# Patient Record
Sex: MALE | Race: WHITE | NOT HISPANIC OR LATINO | Employment: OTHER | URBAN - METROPOLITAN AREA
[De-identification: names, ages, dates, MRNs, and addresses within clinical notes are randomized per-mention and may not be internally consistent; named-entity substitution may affect disease eponyms.]

---

## 2017-01-04 ENCOUNTER — TELEPHONE (OUTPATIENT)
Dept: FAMILY MEDICINE | Facility: CLINIC | Age: 72
End: 2017-01-04

## 2017-01-04 NOTE — TELEPHONE ENCOUNTER
Pt is aware that his sleep study results are needed in order to receive his Cpap supplies. Sleep study order has been faxed to [facility] on 12/23/16.

## 2017-01-08 ENCOUNTER — PATIENT MESSAGE (OUTPATIENT)
Dept: FAMILY MEDICINE | Facility: CLINIC | Age: 72
End: 2017-01-08

## 2017-01-10 ENCOUNTER — OFFICE VISIT (OUTPATIENT)
Dept: NEUROLOGY | Facility: CLINIC | Age: 72
End: 2017-01-10
Payer: MEDICARE

## 2017-01-10 VITALS
WEIGHT: 246.69 LBS | HEART RATE: 76 BPM | SYSTOLIC BLOOD PRESSURE: 118 MMHG | HEIGHT: 73 IN | BODY MASS INDEX: 32.7 KG/M2 | DIASTOLIC BLOOD PRESSURE: 82 MMHG | RESPIRATION RATE: 17 BRPM

## 2017-01-10 DIAGNOSIS — G62.9 NEUROPATHY: Primary | ICD-10-CM

## 2017-01-10 DIAGNOSIS — E66.9 OBESITY (BMI 30.0-34.9): ICD-10-CM

## 2017-01-10 DIAGNOSIS — G56.03 BILATERAL CARPAL TUNNEL SYNDROME: ICD-10-CM

## 2017-01-10 PROCEDURE — 99213 OFFICE O/P EST LOW 20 MIN: CPT | Mod: PBBFAC | Performed by: PSYCHIATRY & NEUROLOGY

## 2017-01-10 PROCEDURE — 99214 OFFICE O/P EST MOD 30 MIN: CPT | Mod: S$PBB,,, | Performed by: PSYCHIATRY & NEUROLOGY

## 2017-01-10 PROCEDURE — 99999 PR PBB SHADOW E&M-EST. PATIENT-LVL III: CPT | Mod: PBBFAC,,, | Performed by: PSYCHIATRY & NEUROLOGY

## 2017-01-10 NOTE — MR AVS SNAPSHOT
[facility]  [address] [facility] [address]  Phone: [phone]  Fax: [phone]                  Alejo Melvin   1/10/2017 4:30 PM   Office Visit    Description:  Male : 1945   Provider:  Regis Hayes MD   Department:  [facility]           Reason for Visit     Neurologic Problem           Diagnoses this Visit        Comments    Neuropathy    -  Primary     Bilateral carpal tunnel syndrome         Obesity (BMI 30.0-34.9)                To Do List           Future Appointments        Provider Department Dept Phone    4/3/2017 8:30 AM Leroy Alston MD [facility] [phone]      Goals (5 Years of Data)     None      Follow-Up and Disposition     Return in about 2 years (around 1/10/2019).      [facility]     [facility] On [facility] Nurse Care Line -  Assistance  Registered nurses in the [facility] On Call Center provide clinical advisement, health education, appointment booking, and other advisory services.  Call for this free service at [phone].             Medications           Message regarding Medications     Verify the changes and/or additions to your medication regime listed below are the same as discussed with your clinician today.  If any of these changes or additions are incorrect, please notify your healthcare provider.        STOP taking these medications     Lactobacillus rhamnosus GG (CULTURELLE) 10 billion cell capsule Take 1 capsule by mouth once daily.           Verify that the below list of medications is an accurate representation of the medications you are currently taking.  If none reported, the list may be blank. If incorrect, please contact your healthcare provider. Carry this list with you in case of emergency.           Current Medications     amlodipine-benazepril 5-20 mg (LOTREL) 5-20 mg per capsule Take 1 capsule by mouth once daily.    ascorbic acid (VITAMIN C) 1000 MG tablet Take 1,000 mg  "by mouth once daily.      aspirin (ECOTRIN) 81 MG EC tablet Take 1 tablet (81 mg total) by mouth 2 (two) times daily.    atorvastatin (LIPITOR) 20 MG tablet Take 20 mg by mouth every evening.    cetirizine (ZYRTEC) 10 MG tablet TAKE 1 TABLET EVERY DAY    cholecalciferol, vitamin D3, (VITAMIN D3) 2,000 unit Cap Take 1 capsule by mouth once daily.    cyanocobalamin (VITAMIN B-12) 1000 MCG tablet Take 100 mcg by mouth once daily.    fish oil-omega-3 fatty acids 300-1,000 mg capsule Take 2 capsules (2 g total) by mouth once daily.    fluticasone (VERAMYST) 27.5 mcg/actuation nasal spray 2 sprays by Nasal route daily 2 hours after breakfast. 2 Spray, Suspension Nasal Every day.  prn allergies    furosemide (LASIX) 40 MG tablet Take 40 mg by mouth once daily.    GLUC STEPHENS/CHONDRO STEPHENS A/VIT C/MN (GLUCOSAMINE 1500 COMPLEX ORAL) Take 2 tablets by mouth once daily.      multivitamin capsule Take by mouth. Half Capsule Oral Twice a day     olopatadine (PATANASE) 0.6 % nasal spray 2 sprays by Each Nare route 2 (two) times daily. 2 Spray, Non-Aerosol Nasal Twice a day    VIAGRA 100 mg tablet TAKE 1 TABLET (100 MG TOTAL) BY MOUTH DAILY AS NEEDED FOR ERECTILE DYSFUNCTION.           Clinical Reference Information           Vital Signs - Last Recorded  Most recent update: 1/10/2017  5:15 PM by Veronique Loera LPN    BP Pulse Resp Ht Wt BMI    118/82 (BP Location: Right arm, Patient Position: Sitting, BP Method: Manual) 76 17 6' 1" (1.854 m) 111.9 kg (246 lb 11.1 oz) 32.55 kg/m2      Blood Pressure          Most Recent Value    BP  118/82      Allergies as of 1/10/2017     No Known Drug Allergies      Immunizations Administered on Date of Encounter - 1/10/2017     None      "
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
## 2017-02-22 ENCOUNTER — OFFICE VISIT (OUTPATIENT)
Dept: FAMILY MEDICINE | Facility: CLINIC | Age: 72
End: 2017-02-22
Payer: MEDICARE

## 2017-02-22 VITALS
BODY MASS INDEX: 32.62 KG/M2 | TEMPERATURE: 98 F | SYSTOLIC BLOOD PRESSURE: 128 MMHG | HEART RATE: 64 BPM | WEIGHT: 254.19 LBS | HEIGHT: 74 IN | RESPIRATION RATE: 18 BRPM | DIASTOLIC BLOOD PRESSURE: 80 MMHG

## 2017-02-22 DIAGNOSIS — M75.01 ADHESIVE CAPSULITIS OF RIGHT SHOULDER: ICD-10-CM

## 2017-02-22 DIAGNOSIS — J01.41 ACUTE RECURRENT PANSINUSITIS: ICD-10-CM

## 2017-02-22 DIAGNOSIS — G47.33 OSA ON CPAP: Primary | ICD-10-CM

## 2017-02-22 PROCEDURE — 96372 THER/PROPH/DIAG INJ SC/IM: CPT | Mod: PBBFAC

## 2017-02-22 PROCEDURE — 99214 OFFICE O/P EST MOD 30 MIN: CPT | Mod: S$PBB,,, | Performed by: FAMILY MEDICINE

## 2017-02-22 PROCEDURE — 99213 OFFICE O/P EST LOW 20 MIN: CPT | Mod: PBBFAC,25 | Performed by: FAMILY MEDICINE

## 2017-02-22 PROCEDURE — 99999 PR PBB SHADOW E&M-EST. PATIENT-LVL III: CPT | Mod: PBBFAC,,, | Performed by: FAMILY MEDICINE

## 2017-02-22 RX ORDER — FLUTICASONE PROPIONATE 50 MCG
1 SPRAY, SUSPENSION (ML) NASAL DAILY PRN
COMMUNITY
End: 2019-04-24 | Stop reason: SDUPTHER

## 2017-02-22 RX ORDER — FUROSEMIDE 40 MG/1
60 TABLET ORAL DAILY
COMMUNITY
End: 2019-04-24 | Stop reason: SDUPTHER

## 2017-02-22 RX ORDER — METHYLPREDNISOLONE ACETATE 40 MG/ML
60 INJECTION, SUSPENSION INTRA-ARTICULAR; INTRALESIONAL; INTRAMUSCULAR; SOFT TISSUE
Status: COMPLETED | OUTPATIENT
Start: 2017-02-22 | End: 2017-02-22

## 2017-02-22 RX ORDER — AZITHROMYCIN 250 MG/1
TABLET, FILM COATED ORAL
Qty: 6 TABLET | Refills: 0 | Status: SHIPPED | OUTPATIENT
Start: 2017-02-22 | End: 2017-04-07

## 2017-02-22 RX ADMIN — METHYLPREDNISOLONE ACETATE 60 MG: 40 INJECTION, SUSPENSION INTRA-ARTICULAR; INTRALESIONAL; INTRAMUSCULAR; SOFT TISSUE at 09:02

## 2017-02-22 NOTE — MR AVS SNAPSHOT
[address] [address]  Phone: [phone]  Fax: [phone]                  Alejo Melvin   2017 8:45 AM   Office Visit    Description:  Male : 1945   Provider:  Leroy Alston MD   Department:  [facility]           Reason for Visit     Cough     Nasal Congestion     Fever           Diagnoses this Visit        Comments    MARYLU on CPAP    -  Primary     Adhesive capsulitis of right shoulder         Acute recurrent pansinusitis                To Do List           Future Appointments        Provider Department Dept Phone    4/3/2017 8:30 AM Leroy Alston MD [facility] [phone]      Goals (5 Years of Data)     None      Follow-Up and Disposition     Return if symptoms worsen or fail to improve.       These Medications        Disp Refills Start End    azithromycin (Z-DESTINEY) 250 MG tablet 6 tablet 0 2017     2 po day 1, then 1 po q day    Pharmacy: [facility]/pharmacy #[id number] - [place] - [address] [facility] Ph #: [phone]         [facility] On Call     [facility] On Call Nurse Care Line - 24/7 Assistance  Registered nurses in the [facility] On Call Center provide clinical advisement, health education, appointment booking, and other advisory services.  Call for this free service at [phone].             Medications           Message regarding Medications     Verify the changes and/or additions to your medication regime listed below are the same as discussed with your clinician today.  If any of these changes or additions are incorrect, please notify your healthcare provider.        START taking these NEW medications        Refills    azithromycin (Z-DESTINEY) 250 MG tablet 0    Si po day 1, then 1 po q day    Class: Normal      These medications were administered today        Dose Freq    methylPREDNISolone acetate injection 60 mg 60 mg Clinic/HOD 1 time    Sig: Inject 1.5 mLs (60 mg total) into the  muscle one time.    Class: Normal    Route: Intramuscular           Verify that the below list of medications is an accurate representation of the medications you are currently taking.  If none reported, the list may be blank. If incorrect, please contact your healthcare provider. Carry this list with you in case of emergency.           Current Medications     amlodipine-benazepril 5-20 mg (LOTREL) 5-20 mg per capsule Take 1 capsule by mouth once daily.    ascorbic acid (VITAMIN C) 1000 MG tablet Take 1,000 mg by mouth once daily.      aspirin (ECOTRIN) 81 MG EC tablet Take 1 tablet (81 mg total) by mouth 2 (two) times daily.    atorvastatin (LIPITOR) 20 MG tablet Take 20 mg by mouth every evening.    cetirizine (ZYRTEC) 10 MG tablet TAKE 1 TABLET EVERY DAY    cholecalciferol, vitamin D3, (VITAMIN D3) 2,000 unit Cap Take 1 capsule by mouth once daily.    cyanocobalamin (VITAMIN B-12) 1000 MCG tablet Take 100 mcg by mouth once daily.    fish oil-omega-3 fatty acids 300-1,000 mg capsule Take 2 capsules (2 g total) by mouth once daily.    fluticasone (FLONASE) 50 mcg/actuation nasal spray 1 spray by Each Nare route daily as needed for Rhinitis.    fluticasone (VERAMYST) 27.5 mcg/actuation nasal spray 2 sprays by Nasal route daily 2 hours after breakfast. 2 Spray, Suspension Nasal Every day.  prn allergies    furosemide (LASIX) 40 MG tablet Take 1/2 tablet by mouth on Monday, Wednesday, and Friday. Take 1 tablet by mouth on Tuesday, Thursday, Saturday, and Sunday.    GLUC STEPHENS/CHONDRO STEPHENS A/VIT C/MN (GLUCOSAMINE 1500 COMPLEX ORAL) Take 2 tablets by mouth once daily.      multivitamin capsule Take 1 capsule by mouth once daily.     olopatadine (PATANASE) 0.6 % nasal spray 2 sprays by Each Nare route 2 (two) times daily. 2 Spray, Non-Aerosol Nasal Twice a day    VIAGRA 100 mg tablet TAKE 1 TABLET (100 MG TOTAL) BY MOUTH DAILY AS NEEDED FOR ERECTILE DYSFUNCTION.    azithromycin (Z-DESTINEY) 250 MG tablet 2 po day 1, then 1 po q  "day           Clinical Reference Information           Your Vitals Were     BP Pulse Temp Resp    128/80 (BP Location: Right arm, Patient Position: Sitting, BP Method: Manual) 64 98.2 °F (36.8 °C) (Tympanic) 18    Height Weight BMI    6' 2" (1.88 m) 115.3 kg (254 lb 3.1 oz) 32.64 kg/m2      Blood Pressure          Most Recent Value    BP  128/80      Allergies as of 2/22/2017     No Known Drug Allergies      Immunizations Administered on Date of Encounter - 2/22/2017     None      Orders Placed During Today's Visit      Normal Orders This Visit    Ambulatory Referral to Physical/Occupational Therapy     Future Labs/Procedures Expected by Expires    CPAP Titration (Must have dx of MARYLU from previous sleep study.)  As directed 2/22/2018      Language Assistance Services     ATTENTION: Language assistance services are available, free of charge. Please call [phone].      ATENCIÓN: Si mere banegas, tiene a vincent disposición servicios gratuitos de asistencia lingüística. Llame al [phone].     TOÑO Ý: N?u b?n nói Ti?ng Vi?t, có các d?ch v? h? tr? ngôn ng? mi?n phí dành cho b?n. G?i s? [phone].         [facility] complies with applicable Federal civil rights laws and does not discriminate on the basis of race, color, national origin, age, disability, or sex.        "
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
## 2017-03-14 ENCOUNTER — HOSPITAL ENCOUNTER (OUTPATIENT)
Dept: SLEEP MEDICINE | Facility: HOSPITAL | Age: 72
Discharge: HOME | End: 2017-03-14
Attending: FAMILY MEDICINE
Payer: MEDICARE

## 2017-03-14 PROCEDURE — 95811 POLYSOM 6/>YRS CPAP 4/> PARM: CPT

## 2017-04-03 ENCOUNTER — PATIENT MESSAGE (OUTPATIENT)
Dept: FAMILY MEDICINE | Facility: CLINIC | Age: 72
End: 2017-04-03

## 2017-04-07 ENCOUNTER — OFFICE VISIT (OUTPATIENT)
Dept: FAMILY MEDICINE | Facility: CLINIC | Age: 72
End: 2017-04-07
Payer: MEDICARE

## 2017-04-07 VITALS
BODY MASS INDEX: 32.81 KG/M2 | HEART RATE: 72 BPM | HEIGHT: 73 IN | WEIGHT: 247.56 LBS | SYSTOLIC BLOOD PRESSURE: 116 MMHG | DIASTOLIC BLOOD PRESSURE: 86 MMHG | RESPIRATION RATE: 14 BRPM

## 2017-04-07 DIAGNOSIS — G47.33 OSA ON CPAP: ICD-10-CM

## 2017-04-07 DIAGNOSIS — E78.2 MIXED HYPERLIPIDEMIA: Primary | ICD-10-CM

## 2017-04-07 DIAGNOSIS — J33.9 NASAL POLYP: ICD-10-CM

## 2017-04-07 DIAGNOSIS — I10 ESSENTIAL HYPERTENSION: ICD-10-CM

## 2017-04-07 LAB
ALT SERPL W/O P-5'-P-CCNC: 20 U/L
ANION GAP SERPL CALC-SCNC: 6 MMOL/L
BUN SERPL-MCNC: 22 MG/DL
CALCIUM SERPL-MCNC: 9.5 MG/DL
CHLORIDE SERPL-SCNC: 105 MMOL/L
CHOLEST/HDLC SERPL: 4.8 {RATIO}
CO2 SERPL-SCNC: 29 MMOL/L
CREAT SERPL-MCNC: 1 MG/DL
EST. GFR  (AFRICAN AMERICAN): >60 ML/MIN/1.73 M^2
EST. GFR  (NON AFRICAN AMERICAN): >60 ML/MIN/1.73 M^2
GLUCOSE SERPL-MCNC: 89 MG/DL
HDL/CHOLESTEROL RATIO: 20.9 %
HDLC SERPL-MCNC: 158 MG/DL
HDLC SERPL-MCNC: 33 MG/DL
LDLC SERPL CALC-MCNC: 99 MG/DL
NONHDLC SERPL-MCNC: 125 MG/DL
POTASSIUM SERPL-SCNC: 4.1 MMOL/L
SODIUM SERPL-SCNC: 140 MMOL/L
TRIGL SERPL-MCNC: 130 MG/DL

## 2017-04-07 PROCEDURE — 84460 ALANINE AMINO (ALT) (SGPT): CPT

## 2017-04-07 PROCEDURE — 99999 PR PBB SHADOW E&M-EST. PATIENT-LVL III: CPT | Mod: PBBFAC,,, | Performed by: FAMILY MEDICINE

## 2017-04-07 PROCEDURE — 99214 OFFICE O/P EST MOD 30 MIN: CPT | Mod: S$PBB | Performed by: FAMILY MEDICINE

## 2017-04-07 PROCEDURE — 80048 BASIC METABOLIC PNL TOTAL CA: CPT

## 2017-04-07 PROCEDURE — 80061 LIPID PANEL: CPT

## 2017-04-07 PROCEDURE — 3079F DIAST BP 80-89 MM HG: CPT | Performed by: FAMILY MEDICINE

## 2017-04-07 PROCEDURE — 99213 OFFICE O/P EST LOW 20 MIN: CPT | Mod: PBBFAC | Performed by: FAMILY MEDICINE

## 2017-04-07 PROCEDURE — 1157F ADVNC CARE PLAN IN RCRD: CPT | Performed by: FAMILY MEDICINE

## 2017-04-07 PROCEDURE — 1160F RVW MEDS BY RX/DR IN RCRD: CPT | Performed by: FAMILY MEDICINE

## 2017-04-07 PROCEDURE — 36415 COLL VENOUS BLD VENIPUNCTURE: CPT | Mod: PBBFAC

## 2017-04-07 PROCEDURE — 3074F SYST BP LT 130 MM HG: CPT | Performed by: FAMILY MEDICINE

## 2017-04-07 PROCEDURE — 1159F MED LIST DOCD IN RCRD: CPT | Performed by: FAMILY MEDICINE

## 2017-04-07 RX ORDER — AMLODIPINE AND BENAZEPRIL HYDROCHLORIDE 5; 20 MG/1; MG/1
1 CAPSULE ORAL DAILY
Qty: 30 CAPSULE | Refills: 6 | Status: SHIPPED | OUTPATIENT
Start: 2017-04-07 | End: 2018-02-10 | Stop reason: SDUPTHER

## 2017-04-07 NOTE — MR AVS SNAPSHOT
[address] [address]  Phone: [phone]  Fax: [phone]                  Alejo Melvin   2017 9:15 AM   Office Visit    Description:  Male : 1945   Provider:  Leroy Alston MD   Department:  [facility]           Reason for Visit     Follow-up           Diagnoses this Visit        Comments    Mixed hyperlipidemia    -  Primary     Essential hypertension         MARYLU on CPAP         Nasal polyp                To Do List           Future Appointments        Provider Department Dept Phone    10/4/2017 9:15 AM Leroy Alston MD [facility] [phone]      Goals (5 Years of Data)     None      Follow-Up and Disposition     Return in about 6 months (around 10/7/2017).       These Medications        Disp Refills Start End    amlodipine-benazepril 5-20 mg (LOTREL) 5-20 mg per capsule 30 capsule 6 2017     Take 1 capsule by mouth once daily. - Oral    Pharmacy: [facility]/pharmacy #[id number] - [place] - [address] [facility] Ph #: [phone]         [facility] On Call     [facility] On Call Nurse Care Line -  Assistance  Unless otherwise directed by your provider, please contact [facility], our nurse care line that is available for  assistance.     Registered nurses in the [facility] On Call Center provide: appointment scheduling, clinical advisement, health education, and other advisory services.  Call: [phone] (toll free)               Medications           Message regarding Medications     Verify the changes and/or additions to your medication regime listed below are the same as discussed with your clinician today.  If any of these changes or additions are incorrect, please notify your healthcare provider.        STOP taking these medications     azithromycin (Z-DESTINEY) 250 MG tablet 2 po day 1, then 1 po q day           Verify that the below list of medications is an accurate representation of  "the medications you are currently taking.  If none reported, the list may be blank. If incorrect, please contact your healthcare provider. Carry this list with you in case of emergency.           Current Medications     amlodipine-benazepril 5-20 mg (LOTREL) 5-20 mg per capsule Take 1 capsule by mouth once daily.    ascorbic acid (VITAMIN C) 1000 MG tablet Take 1,000 mg by mouth once daily.      aspirin (ECOTRIN) 81 MG EC tablet Take 1 tablet (81 mg total) by mouth 2 (two) times daily.    atorvastatin (LIPITOR) 20 MG tablet Take 20 mg by mouth every evening.    cetirizine (ZYRTEC) 10 MG tablet TAKE 1 TABLET EVERY DAY    cholecalciferol, vitamin D3, (VITAMIN D3) 2,000 unit Cap Take 1 capsule by mouth once daily.    cyanocobalamin (VITAMIN B-12) 1000 MCG tablet Take 100 mcg by mouth once daily.    fish oil-omega-3 fatty acids 300-1,000 mg capsule Take 2 capsules (2 g total) by mouth once daily.    fluticasone (FLONASE) 50 mcg/actuation nasal spray 1 spray by Each Nare route daily as needed for Rhinitis.    fluticasone (VERAMYST) 27.5 mcg/actuation nasal spray 2 sprays by Nasal route daily 2 hours after breakfast. 2 Spray, Suspension Nasal Every day.  prn allergies    furosemide (LASIX) 40 MG tablet Take 1/2 tablet by mouth on Monday, Wednesday, and Friday. Take 1 tablet by mouth on Tuesday, Thursday, Saturday, and Sunday.    GLUC STEPHENS/CHONDRO STEPHENS A/VIT C/MN (GLUCOSAMINE 1500 COMPLEX ORAL) Take 2 tablets by mouth once daily.      multivitamin capsule Take 1 capsule by mouth once daily.     olopatadine (PATANASE) 0.6 % nasal spray 2 sprays by Each Nare route 2 (two) times daily. 2 Spray, Non-Aerosol Nasal Twice a day    VIAGRA 100 mg tablet TAKE 1 TABLET (100 MG TOTAL) BY MOUTH DAILY AS NEEDED FOR ERECTILE DYSFUNCTION.           Clinical Reference Information           Your Vitals Were     BP Pulse Resp Height Weight BMI    116/86 (BP Location: Left arm, Patient Position: Sitting, BP Method: Manual) 72 14 6' 1" (1.854 m) " 112.3 kg (247 lb 9.2 oz) 32.66 kg/m2      Blood Pressure          Most Recent Value    BP  116/86      Allergies as of 4/7/2017     No Known Drug Allergies      Immunizations Administered on Date of Encounter - 4/7/2017     None      Orders Placed During Today's Visit      Normal Orders This Visit    CPAP/BIPAP SUPPLIES     Future Labs/Procedures Expected by Expires    ALT (SGPT)  4/7/2017 4/7/2018    Basic metabolic panel  4/7/2017 4/7/2018    Lipid panel  4/7/2017 4/7/2018      Language Assistance Services     ATTENTION: Language assistance services are available, free of charge. Please call [phone].      ATENCIÓN: Si habla español, tiene a vincent disposición servicios gratuitos de asistencia lingüística. Llame al [phone].     CHÚ Ý: N?u b?n nói Ti?ng Vi?t, có các d?ch v? h? tr? ngôn ng? mi?n phí dành cho b?n. G?i s? [phone].         [facility] complies with applicable Federal civil rights laws and does not discriminate on the basis of race, color, national origin, age, disability, or sex.

## 2017-04-07 NOTE — PROGRESS NOTES
Pt is a 71 y.o. male who presents for check up for   Encounter Diagnoses   Name Primary?    Mixed hyperlipidemia Yes    Essential hypertension     MARYLU on CPAP     Nasal polyp    . Doing well on current meds. Denies any side effects. Prevention is not up to date.    HPI: Patient here for a checkup.  Patient is here 1 week early because he's suffering with rather severe sinus symptoms.  He has a history of nasal polyps.  This seems be getting worse.    He also complains of left knee pain.    Patient has obstructive sleep apnea.  He tolerates his CPAP well.  He was diagnosed with Mobitz type II heart block.  He has a pace maker.  Doing well.  His blood pressure is well controlled with Lotrel.  He does not have side effects such as swelling or chronic dry cough.  He checks his blood pressure at home it is usually better than here.  .  His allergy symptoms are controlled when he is using his fluticasone and patanase.  He saw ENT and they removed polyps which helped a little.    His cholesterol is under good control.  He takes Lipitor 20 mg daily.  He is not having side effects such as flushing or myalgias.  He will stop the niacin.  As arthritis is better since using glucosamine/chondroitin sulfate    ROS:   Gen.: No fever, chills, weight loss, weight gain  HEENT: No headaches, sore throat, change in vision  CV: No chest pain  RESP: No shortness of breath, wheezing, cough  GI: No change in bowel habits, no diarrhea, no abdominal pain, no hematochezia  : No dysuria, frequency  MSK: No muscle pain, joint pain, back pain  NEURO: No numbness, weakness  ENDO: No polyuria, polydipsia, heat or cold intolerance    PMH, PSH, ALLERGIES, SH, FH reviewed  Medications reviewed nurses notes  Last colonoscopy 2/25/14    PHYSICAL EXAM;   Vitals:    04/07/17 0914   BP: 116/86   Pulse: 72   Resp: 14     APPEARANCE: Well nourished, well developed, in no acute distress.   HEENT:  griselda nasal congestion, Clear rhinorrhea, pale boggy  nasal mucosa.  Nasal polyps seen.    CHEST: Lungs clear to auscultation.  CARDIOVASCULAR: Normal S1, S2. No rubs, murmurs or gallops.  Irregular rhythm consistent with Mobitz type II heart block  ABDOMEN: Bowel sounds normal. Not distended. Soft. No tenderness or masses.  : normal prostate. done on 10/15.  MUSCULOSKELETAL: Both knees with crepitus bilaterally.  No effusions.  No edema.  Tenderness over the right posterior thoracic area.  Small hematoma over the right hip pointer.  Heart mild bruising.  Back: Moderate tenderness and spasm the lumbar region, negative straight leg raise, DTRs are 2+ and equal in knees and ankles.  Strength in the legs is 5+ equally.  Straight leg raise did cause lumbar pain and mild pain into the leg.  Skin exam reveals a small old ruptured sebaceous cyst.  3 skin tags.  2 or 3 compound nevi    Lab Results   Component Value Date    LDLCALC 99.0 10/06/2016     BMP  Lab Results   Component Value Date     10/06/2016    K 4.1 10/06/2016     10/06/2016    CO2 25 10/06/2016    BUN 21 10/06/2016    CREATININE 0.9 10/06/2016    CALCIUM 9.6 10/06/2016    ANIONGAP 9 10/06/2016    ESTGFRAFRICA >60 10/06/2016    EGFRNONAA >60 10/06/2016     ASSESSMENT/PLAN:    Hypertension  Two gram sodium diet.    Weight loss discussed.    Try to walk 2 miles per day.    Patient now realizes he must take his medication every day   Current medications will be:  - amlodipine-benazepril 5-20 mg (LOTREL) 5-20 mg per capsule; Take 1 capsule by mouth once daily.  - aspirin (ECOTRIN) 81 MG EC tablet; Take 1 tablet (81 mg total) by mouth 2 (two) times daily.    Hyperlipidemia  Low fat diet.  Avoid sweets.  A 10 pound weight loss by the next visit as a  good goal.  Increase consumption of fruits and vegetables, fish and chicken.  Use medications below:  - fish oil-omega-3 fatty acids 300-1,000 mg capsule; Take 2 capsules (2 g total) by mouth once daily.  - Lipitor 20 mg by mouth daily    Obstructive sleep  apnea (adult) (pediatric)  Continue using CPAP.    Ar (allergic rhinitis)  - fluticasone (VERAMYST) 27.5 mcg/actuation nasal spray; 2 sprays by Nasal route daily 2 hours after breakfast. 2 Spray, Suspension Nasal Every day.  prn allergies  - olopatadine (PATANASE) 0.6 % Spry; 2 sprays by Nasal route 2 (two) times daily. 2 Spray, Non-Aerosol Nasal Twice a day    Osteoarthritis  - naproxen sodium (ALEVE) 220 mg Cap; Take 220 mg by mouth 3 (three) times daily as needed. 1 Capsule Oral Twice a day  If knee pain persists, return to clinic for a joint injection.    Other Orders  - GLUC STEPHENS/CHONDRO STEPHENS A/VIT C/MN (GLUCOSAMINE 1500 COMPLEX ORAL); Take 2 tablets by mouth once daily.    - HYALURONATE SODIUM (HYALURONIC ACID, SODIUM, ORAL); Take 4 capsules by mouth once daily.      Alejo was seen today for follow-up.    Diagnoses and all orders for this visit:    Mixed hyperlipidemia  -     ALT (SGPT); Future  -     Basic metabolic panel; Future  -     Lipid panel; Future    Essential hypertension  -     amlodipine-benazepril 5-20 mg (LOTREL) 5-20 mg per capsule; Take 1 capsule by mouth once daily.    MARYLU on CPAP  -     CPAP/BIPAP SUPPLIES    Nasal polyp    refer to Dr. Lovell-ENT  Next appointment will be in 6 months.

## 2017-04-22 RX ORDER — SILDENAFIL CITRATE 100 MG/1
TABLET, FILM COATED ORAL
Qty: 6 TABLET | Refills: 3 | Status: SHIPPED | OUTPATIENT
Start: 2017-04-22 | End: 2019-04-24

## 2017-07-27 DIAGNOSIS — J30.9 ALLERGIC RHINITIS: ICD-10-CM

## 2017-07-28 RX ORDER — CETIRIZINE HYDROCHLORIDE 10 MG/1
TABLET ORAL
Qty: 30 TABLET | Refills: 5 | Status: SHIPPED | OUTPATIENT
Start: 2017-07-28 | End: 2018-03-19 | Stop reason: SDUPTHER

## 2017-08-22 DIAGNOSIS — Z12.11 COLON CANCER SCREENING: ICD-10-CM

## 2017-09-20 ENCOUNTER — PATIENT OUTREACH (OUTPATIENT)
Dept: ADMINISTRATIVE | Facility: HOSPITAL | Age: 72
End: 2017-09-20

## 2017-10-04 ENCOUNTER — OFFICE VISIT (OUTPATIENT)
Dept: FAMILY MEDICINE | Facility: CLINIC | Age: 72
End: 2017-10-04
Payer: MEDICARE

## 2017-10-04 VITALS
DIASTOLIC BLOOD PRESSURE: 82 MMHG | WEIGHT: 257.38 LBS | SYSTOLIC BLOOD PRESSURE: 148 MMHG | RESPIRATION RATE: 18 BRPM | BODY MASS INDEX: 34.11 KG/M2 | HEART RATE: 68 BPM | HEIGHT: 73 IN

## 2017-10-04 DIAGNOSIS — I10 ESSENTIAL HYPERTENSION: ICD-10-CM

## 2017-10-04 DIAGNOSIS — G47.33 OSA ON CPAP: ICD-10-CM

## 2017-10-04 DIAGNOSIS — G47.33 OBSTRUCTIVE SLEEP APNEA: ICD-10-CM

## 2017-10-04 DIAGNOSIS — E78.2 MIXED HYPERLIPIDEMIA: Primary | ICD-10-CM

## 2017-10-04 PROCEDURE — 99213 OFFICE O/P EST LOW 20 MIN: CPT | Mod: PBBFAC | Performed by: FAMILY MEDICINE

## 2017-10-04 PROCEDURE — 99999 PR PBB SHADOW E&M-EST. PATIENT-LVL III: CPT | Mod: PBBFAC,,, | Performed by: FAMILY MEDICINE

## 2017-10-04 PROCEDURE — 99999 PR STA SHADOW: CPT | Mod: PBBFAC,,, | Performed by: FAMILY MEDICINE

## 2017-10-04 PROCEDURE — 99214 OFFICE O/P EST MOD 30 MIN: CPT | Mod: S$PBB | Performed by: FAMILY MEDICINE

## 2017-10-04 RX ORDER — ATORVASTATIN CALCIUM 40 MG/1
40 TABLET, FILM COATED ORAL NIGHTLY
Refills: 11 | COMMUNITY
Start: 2017-09-05 | End: 2019-04-24 | Stop reason: SDUPTHER

## 2017-10-04 NOTE — PROGRESS NOTES
Pt is a 72 y.o. male who presents for check up for   Encounter Diagnoses   Name Primary?    Mixed hyperlipidemia Yes    Essential hypertension     MARYLU on CPAP     Obstructive sleep apnea    . Doing well on current meds. Denies any side effects. Prevention is not up to date.    HPI: Patient here for a checkup.  Patient is here 1 week early because he's suffering with rather severe sinus symptoms.  He has a history of nasal polyps.  This seems be getting worse.    He also complains of left knee pain.    Patient has obstructive sleep apnea.  He tolerates his CPAP well.  He was diagnosed with Mobitz type II heart block.  He has a pace maker.  Doing well.  His blood pressure is well controlled with Lotrel.  He does not have side effects such as swelling or chronic dry cough.  He checks his blood pressure at home it is usually better than here.  Sees cardiology at this time.  His allergy symptoms are controlled when he is using his fluticasone and patanase.  He saw ENT and they removed polyps which helped a little.    His cholesterol is under good control.  He takes Lipitor 20 mg daily.  He is not having side effects such as flushing or myalgias.  He will stop the niacin.  Has arthritis is better since using glucosamine/chondroitin sulfate    ROS:   Gen.: No fever, chills, weight loss, weight gain  HEENT: No headaches, sore throat, change in vision  CV: No chest pain  RESP: No shortness of breath, wheezing, cough  GI: No change in bowel habits, no diarrhea, no abdominal pain, no hematochezia  : No dysuria, frequency  MSK: No muscle pain, joint pain, back pain  NEURO: No numbness, weakness  ENDO: No polyuria, polydipsia, heat or cold intolerance    PMH, PSH, ALLERGIES, SH, FH reviewed  Medications reviewed nurses notes  Last colonoscopy 2/25/14    PHYSICAL EXAM;   Vitals:    10/04/17 0851   BP: (!) 148/82   Pulse: 68   Resp: 18     APPEARANCE: Well nourished, well developed, in no acute distress.   HEENT:  griselda nasal  congestion, Clear rhinorrhea, pale boggy nasal mucosa.  Nasal polyps seen.    CHEST: Lungs clear to auscultation.  CARDIOVASCULAR: Normal S1, S2. No rubs, murmurs or gallops.  Irregular rhythm consistent with Mobitz type II heart block  ABDOMEN: Bowel sounds normal. Not distended. Soft. No tenderness or masses.  : normal prostate. done on 10/17.  MUSCULOSKELETAL: Both knees with crepitus bilaterally.  No effusions.  No edema.  Tenderness over the right posterior thoracic area.  Small hematoma over the right hip pointer.  Heart mild bruising.  Back: Moderate tenderness and spasm the lumbar region, negative straight leg raise, DTRs are 2+ and equal in knees and ankles.  Strength in the legs is 5+ equally.  Straight leg raise did cause lumbar pain and mild pain into the leg.  Skin exam reveals a small old ruptured sebaceous cyst.  3 skin tags.  2 or 3 compound nevi    Lab Results   Component Value Date    LDLCALC 99.0 04/07/2017     BMP  Lab Results   Component Value Date     04/07/2017    K 4.1 04/07/2017     04/07/2017    CO2 29 04/07/2017    BUN 22 04/07/2017    CREATININE 1.0 04/07/2017    CALCIUM 9.5 04/07/2017    ANIONGAP 6 (L) 04/07/2017    ESTGFRAFRICA >60 04/07/2017    EGFRNONAA >60 04/07/2017     ASSESSMENT/PLAN:    Hypertension  Two gram sodium diet.    Weight loss discussed.    Try to walk 2 miles per day.    Patient now realizes he must take his medication every day   Current medications will be:  - amlodipine-benazepril 5-20 mg (LOTREL) 5-20 mg per capsule; Take 1 capsule by mouth once daily.  - aspirin (ECOTRIN) 81 MG EC tablet; Take 1 tablet (81 mg total) by mouth 2 (two) times daily.    Hyperlipidemia  Low fat diet.  Avoid sweets.  A 10 pound weight loss by the next visit as a  good goal.  Increase consumption of fruits and vegetables, fish and chicken.  Use medications below:  - fish oil-omega-3 fatty acids 300-1,000 mg capsule; Take 2 capsules (2 g total) by mouth once daily.  - Lipitor  20 mg by mouth daily    Obstructive sleep apnea (adult) (pediatric)  Continue using CPAP.    Ar (allergic rhinitis)  - fluticasone (VERAMYST) 27.5 mcg/actuation nasal spray; 2 sprays by Nasal route daily 2 hours after breakfast. 2 Spray, Suspension Nasal Every day.  prn allergies  - olopatadine (PATANASE) 0.6 % Spry; 2 sprays by Nasal route 2 (two) times daily. 2 Spray, Non-Aerosol Nasal Twice a day    Osteoarthritis  - naproxen sodium (ALEVE) 220 mg Cap; Take 220 mg by mouth 3 (three) times daily as needed. 1 Capsule Oral Twice a day  If knee pain persists, return to clinic for a joint injection.    Other Orders  - GLUC STEPHENS/CHONDRO STEPHENS A/VIT C/MN (GLUCOSAMINE 1500 COMPLEX ORAL); Take 2 tablets by mouth once daily.    - HYALURONATE SODIUM (HYALURONIC ACID, SODIUM, ORAL); Take 4 capsules by mouth once daily.      Alejo was seen today for follow-up.    Diagnoses and all orders for this visit:    Mixed hyperlipidemia    Essential hypertension    MARYLU on CPAP    Obstructive sleep apnea      Keep appt with Dr. Lovell-ENT    Next appointment will be in 6 months.

## 2018-01-04 ENCOUNTER — PATIENT MESSAGE (OUTPATIENT)
Dept: FAMILY MEDICINE | Facility: CLINIC | Age: 73
End: 2018-01-04

## 2018-01-05 RX ORDER — SILDENAFIL CITRATE 20 MG/1
TABLET ORAL
Qty: 30 TABLET | Refills: 5 | Status: SHIPPED | OUTPATIENT
Start: 2018-01-05 | End: 2019-03-27 | Stop reason: SDUPTHER

## 2018-01-20 ENCOUNTER — PATIENT MESSAGE (OUTPATIENT)
Dept: FAMILY MEDICINE | Facility: CLINIC | Age: 73
End: 2018-01-20

## 2018-02-10 DIAGNOSIS — I10 ESSENTIAL HYPERTENSION: ICD-10-CM

## 2018-02-11 RX ORDER — AMLODIPINE AND BENAZEPRIL HYDROCHLORIDE 5; 20 MG/1; MG/1
CAPSULE ORAL
Qty: 30 CAPSULE | Refills: 6 | Status: SHIPPED | OUTPATIENT
Start: 2018-02-11 | End: 2018-10-12 | Stop reason: SDUPTHER

## 2018-03-19 DIAGNOSIS — J30.9 ALLERGIC RHINITIS: ICD-10-CM

## 2018-03-19 RX ORDER — CETIRIZINE HYDROCHLORIDE 10 MG/1
TABLET, FILM COATED ORAL
Qty: 30 TABLET | Refills: 5 | Status: SHIPPED | OUTPATIENT
Start: 2018-03-19 | End: 2018-10-12 | Stop reason: SDUPTHER

## 2018-04-09 ENCOUNTER — OFFICE VISIT (OUTPATIENT)
Dept: FAMILY MEDICINE | Facility: CLINIC | Age: 73
End: 2018-04-09
Payer: MEDICARE

## 2018-04-09 VITALS
SYSTOLIC BLOOD PRESSURE: 112 MMHG | BODY MASS INDEX: 34.11 KG/M2 | DIASTOLIC BLOOD PRESSURE: 70 MMHG | WEIGHT: 257.38 LBS | HEART RATE: 76 BPM | HEIGHT: 73 IN | RESPIRATION RATE: 18 BRPM

## 2018-04-09 DIAGNOSIS — M17.12 PRIMARY OSTEOARTHRITIS OF LEFT KNEE: ICD-10-CM

## 2018-04-09 DIAGNOSIS — J30.89 CHRONIC NONSEASONAL ALLERGIC RHINITIS DUE TO POLLEN: ICD-10-CM

## 2018-04-09 DIAGNOSIS — L02.419 CELLULITIS AND ABSCESS OF LEG: Primary | ICD-10-CM

## 2018-04-09 DIAGNOSIS — G47.33 OSA ON CPAP: ICD-10-CM

## 2018-04-09 DIAGNOSIS — L03.119 CELLULITIS AND ABSCESS OF LEG: Primary | ICD-10-CM

## 2018-04-09 PROCEDURE — 99214 OFFICE O/P EST MOD 30 MIN: CPT | Mod: S$PBB | Performed by: FAMILY MEDICINE

## 2018-04-09 PROCEDURE — 99213 OFFICE O/P EST LOW 20 MIN: CPT | Mod: PBBFAC | Performed by: FAMILY MEDICINE

## 2018-04-09 PROCEDURE — 99999 PR PBB SHADOW E&M-EST. PATIENT-LVL III: CPT | Mod: PBBFAC,,, | Performed by: FAMILY MEDICINE

## 2018-04-09 PROCEDURE — 99999 PR STA SHADOW: CPT | Mod: PBBFAC,,, | Performed by: FAMILY MEDICINE

## 2018-04-09 RX ORDER — MUPIROCIN 20 MG/G
OINTMENT TOPICAL 2 TIMES DAILY
Qty: 22 G | Refills: 3 | Status: SHIPPED | OUTPATIENT
Start: 2018-04-09 | End: 2019-05-01

## 2018-04-09 RX ORDER — CEFADROXIL 500 MG/1
500 CAPSULE ORAL EVERY 12 HOURS
Qty: 14 CAPSULE | Refills: 0 | Status: SHIPPED | OUTPATIENT
Start: 2018-04-09 | End: 2018-04-19
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
## 2018-04-20 ENCOUNTER — TELEPHONE (OUTPATIENT)
Dept: FAMILY MEDICINE | Facility: CLINIC | Age: 73
End: 2018-04-20

## 2018-04-21 ENCOUNTER — OFFICE VISIT (OUTPATIENT)
Dept: FAMILY MEDICINE | Facility: CLINIC | Age: 73
End: 2018-04-21
Payer: MEDICARE

## 2018-04-21 VITALS
RESPIRATION RATE: 20 BRPM | TEMPERATURE: 98 F | SYSTOLIC BLOOD PRESSURE: 130 MMHG | WEIGHT: 263.19 LBS | DIASTOLIC BLOOD PRESSURE: 80 MMHG | HEIGHT: 73 IN | HEART RATE: 86 BPM | BODY MASS INDEX: 34.88 KG/M2

## 2018-04-21 DIAGNOSIS — R23.4 ESCHAR OF LOWER LEG: Primary | ICD-10-CM

## 2018-04-21 PROCEDURE — 99999 PR STA SHADOW: CPT | Mod: PBBFAC,,, | Performed by: NURSE PRACTITIONER

## 2018-04-21 PROCEDURE — 99999 PR PBB SHADOW E&M-EST. PATIENT-LVL IV: CPT | Mod: PBBFAC,,, | Performed by: NURSE PRACTITIONER

## 2018-04-21 PROCEDURE — 99214 OFFICE O/P EST MOD 30 MIN: CPT | Mod: PBBFAC | Performed by: NURSE PRACTITIONER

## 2018-04-21 PROCEDURE — 99213 OFFICE O/P EST LOW 20 MIN: CPT | Mod: S$PBB | Performed by: NURSE PRACTITIONER
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
## 2018-04-24 ENCOUNTER — OFFICE VISIT (OUTPATIENT)
Dept: FAMILY MEDICINE | Facility: CLINIC | Age: 73
End: 2018-04-24
Payer: MEDICARE

## 2018-04-24 VITALS
HEIGHT: 73 IN | SYSTOLIC BLOOD PRESSURE: 120 MMHG | HEART RATE: 76 BPM | BODY MASS INDEX: 34.62 KG/M2 | RESPIRATION RATE: 18 BRPM | DIASTOLIC BLOOD PRESSURE: 72 MMHG | WEIGHT: 261.19 LBS

## 2018-04-24 DIAGNOSIS — T25.212D: ICD-10-CM

## 2018-04-24 DIAGNOSIS — R23.4 ESCHAR OF LOWER LEG: Primary | ICD-10-CM

## 2018-04-24 PROCEDURE — 99999 PR PBB SHADOW E&M-EST. PATIENT-LVL III: CPT | Mod: PBBFAC,,, | Performed by: FAMILY MEDICINE

## 2018-04-24 PROCEDURE — 99213 OFFICE O/P EST LOW 20 MIN: CPT | Mod: PBBFAC | Performed by: FAMILY MEDICINE

## 2018-04-24 PROCEDURE — 99999 PR STA SHADOW: CPT | Mod: PBBFAC,,, | Performed by: FAMILY MEDICINE

## 2018-04-24 PROCEDURE — 99213 OFFICE O/P EST LOW 20 MIN: CPT | Mod: S$PBB | Performed by: FAMILY MEDICINE
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
## 2018-05-11 ENCOUNTER — PATIENT MESSAGE (OUTPATIENT)
Dept: FAMILY MEDICINE | Facility: CLINIC | Age: 73
End: 2018-05-11

## 2018-07-25 ENCOUNTER — TELEPHONE (OUTPATIENT)
Dept: FAMILY MEDICINE | Facility: CLINIC | Age: 73
End: 2018-07-25

## 2018-07-25 ENCOUNTER — PATIENT MESSAGE (OUTPATIENT)
Dept: FAMILY MEDICINE | Facility: CLINIC | Age: 73
End: 2018-07-25

## 2018-07-25 DIAGNOSIS — G47.33 OSA ON CPAP: Primary | ICD-10-CM

## 2018-07-25 NOTE — TELEPHONE ENCOUNTER
----- Message from Giuliano Torre sent at 2018  9:40 AM CDT -----  Contact: Patient  Alejo Melvin  MRN: [id number]  : 1945  PCP: Leroy Alston  Home Phone      [phone]  Work Phone      Not on file.  Mobile          [phone]      MESSAGE: needs new C-pap machine -- send to [facility]    Call [phone]    PCP: Alecia

## 2018-07-26 NOTE — TELEPHONE ENCOUNTER
----- Message from Diana Cheng MA sent at 2018 11:29 AM CDT -----  Contact: Self  Alejo Melvin  MRN: [id number]  : 1945  PCP: Leroy Alston  Home Phone      [phone]  Work Phone      Not on file.  Mobile          [phone]      MESSAGE: Patient was to call and let Dr Harper's nurse where to send his orders for the CPAP machine. He wants his CPAP machine through [facility].  Phone: [phone]

## 2018-07-27 NOTE — TELEPHONE ENCOUNTER
Spoke with pt, informed him his cpap machine request went to [facility] . Pt voiced verbal understanding

## 2018-08-03 DIAGNOSIS — G47.33 OSA ON CPAP: Primary | ICD-10-CM

## 2018-08-10 DIAGNOSIS — G47.33 OSA ON CPAP: Primary | ICD-10-CM

## 2018-10-09 ENCOUNTER — OFFICE VISIT (OUTPATIENT)
Dept: FAMILY MEDICINE | Facility: CLINIC | Age: 73
End: 2018-10-09
Payer: MEDICARE

## 2018-10-09 VITALS
DIASTOLIC BLOOD PRESSURE: 68 MMHG | SYSTOLIC BLOOD PRESSURE: 110 MMHG | RESPIRATION RATE: 18 BRPM | BODY MASS INDEX: 35.65 KG/M2 | HEART RATE: 72 BPM | WEIGHT: 269 LBS | HEIGHT: 73 IN

## 2018-10-09 DIAGNOSIS — E78.2 MIXED HYPERLIPIDEMIA: ICD-10-CM

## 2018-10-09 DIAGNOSIS — G47.33 OSA ON CPAP: ICD-10-CM

## 2018-10-09 DIAGNOSIS — I10 ESSENTIAL HYPERTENSION: ICD-10-CM

## 2018-10-09 DIAGNOSIS — M17.12 PRIMARY OSTEOARTHRITIS OF LEFT KNEE: Primary | ICD-10-CM

## 2018-10-09 PROCEDURE — 99214 OFFICE O/P EST MOD 30 MIN: CPT | Mod: S$PBB,25,, | Performed by: FAMILY MEDICINE

## 2018-10-09 PROCEDURE — 1101F PT FALLS ASSESS-DOCD LE1/YR: CPT | Mod: ,,, | Performed by: FAMILY MEDICINE

## 2018-10-09 PROCEDURE — 20610 DRAIN/INJ JOINT/BURSA W/O US: CPT | Mod: PBBFAC | Performed by: FAMILY MEDICINE

## 2018-10-09 PROCEDURE — 20610 DRAIN/INJ JOINT/BURSA W/O US: CPT | Mod: S$PBB,LT,, | Performed by: FAMILY MEDICINE

## 2018-10-09 PROCEDURE — 99999 PR PBB SHADOW E&M-EST. PATIENT-LVL III: CPT | Mod: PBBFAC,,, | Performed by: FAMILY MEDICINE

## 2018-10-09 PROCEDURE — 99213 OFFICE O/P EST LOW 20 MIN: CPT | Mod: PBBFAC,25 | Performed by: FAMILY MEDICINE

## 2018-10-09 PROCEDURE — 3078F DIAST BP <80 MM HG: CPT | Mod: ,,, | Performed by: FAMILY MEDICINE

## 2018-10-09 PROCEDURE — 3074F SYST BP LT 130 MM HG: CPT | Mod: ,,, | Performed by: FAMILY MEDICINE

## 2018-10-09 RX ORDER — TRIAMCINOLONE ACETONIDE 40 MG/ML
40 INJECTION, SUSPENSION INTRA-ARTICULAR; INTRAMUSCULAR
Status: COMPLETED | OUTPATIENT
Start: 2018-10-09 | End: 2018-10-09

## 2018-10-09 RX ORDER — CLOBETASOL PROPIONATE 0.5 MG/G
CREAM TOPICAL
COMMUNITY
End: 2019-04-24 | Stop reason: SDUPTHER

## 2018-10-09 RX ORDER — CLOBETASOL PROPIONATE 0.5 MG/G
CREAM TOPICAL
Refills: 1 | COMMUNITY
Start: 2018-10-01 | End: 2019-04-24

## 2018-10-09 RX ORDER — BUPIVACAINE HYDROCHLORIDE 5 MG/ML
2 INJECTION, SOLUTION EPIDURAL; INTRACAUDAL
Status: COMPLETED | OUTPATIENT
Start: 2018-10-09 | End: 2018-10-09

## 2018-10-09 RX ADMIN — TRIAMCINOLONE ACETONIDE 40 MG: 40 INJECTION, SUSPENSION INTRA-ARTICULAR; INTRAMUSCULAR at 12:10

## 2018-10-09 RX ADMIN — BUPIVACAINE HYDROCHLORIDE 10 MG: 5 INJECTION, SOLUTION EPIDURAL; INTRACAUDAL at 12:10
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
## 2018-10-12 DIAGNOSIS — I10 ESSENTIAL HYPERTENSION: ICD-10-CM

## 2018-10-12 DIAGNOSIS — J30.9 ALLERGIC RHINITIS: ICD-10-CM

## 2018-10-12 RX ORDER — CETIRIZINE HYDROCHLORIDE 10 MG/1
TABLET ORAL
Qty: 30 TABLET | Refills: 5 | Status: SHIPPED | OUTPATIENT
Start: 2018-10-12 | End: 2019-07-01 | Stop reason: SDUPTHER

## 2018-10-12 RX ORDER — AMLODIPINE AND BENAZEPRIL HYDROCHLORIDE 5; 20 MG/1; MG/1
CAPSULE ORAL
Qty: 30 CAPSULE | Refills: 6 | Status: SHIPPED | OUTPATIENT
Start: 2018-10-12 | End: 2019-04-24 | Stop reason: SDUPTHER

## 2018-11-07 ENCOUNTER — PES CALL (OUTPATIENT)
Dept: ADMINISTRATIVE | Facility: CLINIC | Age: 73
End: 2018-11-07

## 2019-03-29 RX ORDER — SILDENAFIL CITRATE 20 MG/1
TABLET ORAL
Qty: 30 TABLET | Refills: 4 | Status: SHIPPED | OUTPATIENT
Start: 2019-03-29 | End: 2020-05-08

## 2019-04-24 ENCOUNTER — OFFICE VISIT (OUTPATIENT)
Dept: FAMILY MEDICINE | Facility: CLINIC | Age: 74
End: 2019-04-24
Payer: MEDICARE

## 2019-04-24 VITALS
RESPIRATION RATE: 18 BRPM | HEIGHT: 74 IN | DIASTOLIC BLOOD PRESSURE: 70 MMHG | BODY MASS INDEX: 35.16 KG/M2 | SYSTOLIC BLOOD PRESSURE: 122 MMHG | WEIGHT: 274 LBS | HEART RATE: 60 BPM

## 2019-04-24 DIAGNOSIS — Z12.5 SCREENING FOR PROSTATE CANCER: ICD-10-CM

## 2019-04-24 DIAGNOSIS — J33.9 NASAL POLYP: Primary | ICD-10-CM

## 2019-04-24 DIAGNOSIS — I10 ESSENTIAL HYPERTENSION: ICD-10-CM

## 2019-04-24 DIAGNOSIS — G47.33 OSA ON CPAP: ICD-10-CM

## 2019-04-24 DIAGNOSIS — M17.12 PRIMARY OSTEOARTHRITIS OF LEFT KNEE: ICD-10-CM

## 2019-04-24 DIAGNOSIS — E78.2 MIXED HYPERLIPIDEMIA: ICD-10-CM

## 2019-04-24 PROCEDURE — 3074F PR MOST RECENT SYSTOLIC BLOOD PRESSURE < 130 MM HG: ICD-10-PCS | Mod: S$GLB,,, | Performed by: FAMILY MEDICINE

## 2019-04-24 PROCEDURE — 99499 RISK ADDL DX/OHS AUDIT: ICD-10-PCS | Mod: S$GLB,,, | Performed by: FAMILY MEDICINE

## 2019-04-24 PROCEDURE — 99214 OFFICE O/P EST MOD 30 MIN: CPT | Mod: S$GLB,,, | Performed by: FAMILY MEDICINE

## 2019-04-24 PROCEDURE — 1101F PT FALLS ASSESS-DOCD LE1/YR: CPT | Mod: S$GLB,,, | Performed by: FAMILY MEDICINE

## 2019-04-24 PROCEDURE — 3074F SYST BP LT 130 MM HG: CPT | Mod: S$GLB,,, | Performed by: FAMILY MEDICINE

## 2019-04-24 PROCEDURE — 1101F PR PT FALLS ASSESS DOC 0-1 FALLS W/OUT INJ PAST YR: ICD-10-PCS | Mod: S$GLB,,, | Performed by: FAMILY MEDICINE

## 2019-04-24 PROCEDURE — 3078F DIAST BP <80 MM HG: CPT | Mod: S$GLB,,, | Performed by: FAMILY MEDICINE

## 2019-04-24 PROCEDURE — 99214 PR OFFICE/OUTPT VISIT, EST, LEVL IV, 30-39 MIN: ICD-10-PCS | Mod: S$GLB,,, | Performed by: FAMILY MEDICINE

## 2019-04-24 PROCEDURE — 99499 UNLISTED E&M SERVICE: CPT | Mod: S$GLB,,, | Performed by: FAMILY MEDICINE

## 2019-04-24 PROCEDURE — 99999 PR PBB SHADOW E&M-EST. PATIENT-LVL III: CPT | Mod: PBBFAC,,, | Performed by: FAMILY MEDICINE

## 2019-04-24 PROCEDURE — 99999 PR PBB SHADOW E&M-EST. PATIENT-LVL III: ICD-10-PCS | Mod: PBBFAC,,, | Performed by: FAMILY MEDICINE

## 2019-04-24 PROCEDURE — 3078F PR MOST RECENT DIASTOLIC BLOOD PRESSURE < 80 MM HG: ICD-10-PCS | Mod: S$GLB,,, | Performed by: FAMILY MEDICINE

## 2019-04-24 RX ORDER — ATORVASTATIN CALCIUM 40 MG/1
40 TABLET, FILM COATED ORAL NIGHTLY
Qty: 30 TABLET | Refills: 5 | Status: SHIPPED | OUTPATIENT
Start: 2019-04-24 | End: 2019-12-19 | Stop reason: SDUPTHER

## 2019-04-24 RX ORDER — FLUTICASONE PROPIONATE 50 MCG
1 SPRAY, SUSPENSION (ML) NASAL DAILY PRN
Qty: 1 BOTTLE | Refills: 5 | Status: SHIPPED | OUTPATIENT
Start: 2019-04-24 | End: 2020-03-20 | Stop reason: SDUPTHER

## 2019-04-24 RX ORDER — FUROSEMIDE 40 MG/1
60 TABLET ORAL DAILY
Qty: 45 TABLET | Refills: 5 | Status: SHIPPED | OUTPATIENT
Start: 2019-04-24 | End: 2020-03-20 | Stop reason: SDUPTHER

## 2019-04-24 RX ORDER — AMLODIPINE AND BENAZEPRIL HYDROCHLORIDE 5; 20 MG/1; MG/1
1 CAPSULE ORAL DAILY
Qty: 30 CAPSULE | Refills: 5 | Status: SHIPPED | OUTPATIENT
Start: 2019-04-24 | End: 2019-09-09 | Stop reason: SINTOL
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
## 2019-04-25 ENCOUNTER — CLINICAL SUPPORT (OUTPATIENT)
Dept: FAMILY MEDICINE | Facility: CLINIC | Age: 74
End: 2019-04-25
Payer: MEDICARE

## 2019-04-25 DIAGNOSIS — M17.12 PRIMARY OSTEOARTHRITIS OF LEFT KNEE: ICD-10-CM

## 2019-04-25 DIAGNOSIS — I10 ESSENTIAL HYPERTENSION: ICD-10-CM

## 2019-04-25 DIAGNOSIS — E78.2 MIXED HYPERLIPIDEMIA: ICD-10-CM

## 2019-04-25 LAB
ALT SERPL W/O P-5'-P-CCNC: 28 U/L (ref 10–44)
ANION GAP SERPL CALC-SCNC: 10 MMOL/L (ref 8–16)
BUN SERPL-MCNC: 15 MG/DL (ref 8–23)
CALCIUM SERPL-MCNC: 9.3 MG/DL (ref 8.7–10.5)
CHLORIDE SERPL-SCNC: 107 MMOL/L (ref 95–110)
CHOLEST SERPL-MCNC: 148 MG/DL (ref 120–199)
CHOLEST/HDLC SERPL: 5.1 {RATIO} (ref 2–5)
CO2 SERPL-SCNC: 24 MMOL/L (ref 23–29)
COMPLEXED PSA SERPL-MCNC: 6.8 NG/ML (ref 0–4)
CREAT SERPL-MCNC: 0.9 MG/DL (ref 0.5–1.4)
EST. GFR  (AFRICAN AMERICAN): >60 ML/MIN/1.73 M^2
EST. GFR  (NON AFRICAN AMERICAN): >60 ML/MIN/1.73 M^2
GLUCOSE SERPL-MCNC: 89 MG/DL (ref 70–110)
HDLC SERPL-MCNC: 29 MG/DL (ref 40–75)
HDLC SERPL: 19.6 % (ref 20–50)
LDLC SERPL CALC-MCNC: 90.2 MG/DL (ref 63–159)
NONHDLC SERPL-MCNC: 119 MG/DL
POTASSIUM SERPL-SCNC: 4 MMOL/L (ref 3.5–5.1)
SODIUM SERPL-SCNC: 141 MMOL/L (ref 136–145)
TRIGL SERPL-MCNC: 144 MG/DL (ref 30–150)

## 2019-04-25 PROCEDURE — 80061 LIPID PANEL: CPT

## 2019-04-25 PROCEDURE — 84460 ALANINE AMINO (ALT) (SGPT): CPT

## 2019-04-25 PROCEDURE — 84153 ASSAY OF PSA TOTAL: CPT

## 2019-04-25 PROCEDURE — 80048 BASIC METABOLIC PNL TOTAL CA: CPT

## 2019-04-25 PROCEDURE — 36415 COLL VENOUS BLD VENIPUNCTURE: CPT | Mod: S$GLB,,, | Performed by: FAMILY MEDICINE

## 2019-04-25 PROCEDURE — 36415 PR COLLECTION VENOUS BLOOD,VENIPUNCTURE: ICD-10-PCS | Mod: S$GLB,,, | Performed by: FAMILY MEDICINE

## 2019-04-25 NOTE — PROGRESS NOTES
Patient, Alejo Melvin (MRN #[id number]), presented with a recorded BMI of 35.18 kg/m^2 and a documented comorbidity(s):  - Obstructive Sleep Apnea  - Hypertension  - Hyperlipidemia  to which the severe obesity is a contributing factor. This is consistent with the definition of severe obesity (BMI 35.0-39.9) with comorbidity (ICD-10 E66.01, Z68.35). The patient's severe obesity was monitored, evaluated, addressed and/or treated. This addendum to the medical record is made on 04/25/2019.

## 2019-04-26 ENCOUNTER — TELEPHONE (OUTPATIENT)
Dept: FAMILY MEDICINE | Facility: CLINIC | Age: 74
End: 2019-04-26

## 2019-04-26 ENCOUNTER — PATIENT MESSAGE (OUTPATIENT)
Dept: FAMILY MEDICINE | Facility: CLINIC | Age: 74
End: 2019-04-26

## 2019-04-26 DIAGNOSIS — R97.20 ELEVATED PSA, LESS THAN 10 NG/ML: Primary | ICD-10-CM

## 2019-04-26 NOTE — TELEPHONE ENCOUNTER
----- Message from Leroy Alston MD sent at 4/26/2019 10:27 AM CDT -----  Tell patient that labs look good.    Elevated PSA.  Recommend seeing Dr. Osuna-urology

## 2019-04-30 NOTE — TELEPHONE ENCOUNTER
Spoke to pt and he verbally understood that his labs are normal and he should follow up with Dr. Osuna Urology because of his PSA are elevated.

## 2019-05-01 ENCOUNTER — OFFICE VISIT (OUTPATIENT)
Dept: NEUROLOGY | Facility: CLINIC | Age: 74
End: 2019-05-01
Payer: MEDICARE

## 2019-05-01 VITALS
SYSTOLIC BLOOD PRESSURE: 126 MMHG | RESPIRATION RATE: 18 BRPM | WEIGHT: 270.94 LBS | HEIGHT: 74 IN | DIASTOLIC BLOOD PRESSURE: 80 MMHG | BODY MASS INDEX: 34.77 KG/M2 | HEART RATE: 76 BPM

## 2019-05-01 DIAGNOSIS — G47.33 OBSTRUCTIVE SLEEP APNEA: ICD-10-CM

## 2019-05-01 DIAGNOSIS — E78.2 MIXED HYPERLIPIDEMIA: ICD-10-CM

## 2019-05-01 DIAGNOSIS — Z95.0 PACEMAKER: ICD-10-CM

## 2019-05-01 DIAGNOSIS — G62.9 POLYNEUROPATHY: ICD-10-CM

## 2019-05-01 DIAGNOSIS — G56.03 BILATERAL CARPAL TUNNEL SYNDROME: ICD-10-CM

## 2019-05-01 DIAGNOSIS — I10 ESSENTIAL HYPERTENSION: Primary | ICD-10-CM

## 2019-05-01 PROCEDURE — 99999 PR PBB SHADOW E&M-EST. PATIENT-LVL IV: CPT | Mod: PBBFAC,,, | Performed by: NURSE PRACTITIONER

## 2019-05-01 PROCEDURE — 99999 PR PBB SHADOW E&M-EST. PATIENT-LVL IV: ICD-10-PCS | Mod: PBBFAC,,, | Performed by: NURSE PRACTITIONER

## 2019-05-01 PROCEDURE — 99214 OFFICE O/P EST MOD 30 MIN: CPT | Mod: S$GLB,,, | Performed by: NURSE PRACTITIONER

## 2019-05-01 PROCEDURE — 1101F PT FALLS ASSESS-DOCD LE1/YR: CPT | Mod: S$GLB,,, | Performed by: NURSE PRACTITIONER

## 2019-05-01 PROCEDURE — 99214 PR OFFICE/OUTPT VISIT, EST, LEVL IV, 30-39 MIN: ICD-10-PCS | Mod: S$GLB,,, | Performed by: NURSE PRACTITIONER

## 2019-05-01 PROCEDURE — 3074F PR MOST RECENT SYSTOLIC BLOOD PRESSURE < 130 MM HG: ICD-10-PCS | Mod: S$GLB,,, | Performed by: NURSE PRACTITIONER

## 2019-05-01 PROCEDURE — 3079F PR MOST RECENT DIASTOLIC BLOOD PRESSURE 80-89 MM HG: ICD-10-PCS | Mod: S$GLB,,, | Performed by: NURSE PRACTITIONER

## 2019-05-01 PROCEDURE — 1101F PR PT FALLS ASSESS DOC 0-1 FALLS W/OUT INJ PAST YR: ICD-10-PCS | Mod: S$GLB,,, | Performed by: NURSE PRACTITIONER

## 2019-05-01 PROCEDURE — 3074F SYST BP LT 130 MM HG: CPT | Mod: S$GLB,,, | Performed by: NURSE PRACTITIONER

## 2019-05-01 PROCEDURE — 3079F DIAST BP 80-89 MM HG: CPT | Mod: S$GLB,,, | Performed by: NURSE PRACTITIONER

## 2019-05-01 NOTE — PROGRESS NOTES
Subjective:      Chief Complaint:  Neurologic Problem (2 year annual follow up)      History of Present Illness  Alejo Melvin is a 74 y.o. 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 He has HTN, HLD, MARYLU, and he has a pacemaker.     He presents today for a two year follow up visit. He denies any CT complaints. He does have some OA complaints to his hands bilaterally.     He reports to having a mild burning sensation to his feet. No numbness or loss of balance. He attributes his worsening complaints to weight gain. He gained 24 pounds in the past two years. The OA to his knees has slowed him down. He is doing water aerobics in an attempt to stay active. He is planning to see Ortho soon.     I have reviewed all of this patient's past medical and surgical histories as well as family and social histories and active allergies and medications as documented in the electronic medical record.    Review of Systems  Review of Systems   Constitutional: Negative for fatigue.   HENT: Negative for trouble swallowing and voice change.    Eyes: Negative for visual disturbance.   Respiratory: Negative for shortness of breath.    Cardiovascular: Negative for palpitations.   Gastrointestinal: Negative for anal bleeding.   Genitourinary: Negative for hematuria.   Musculoskeletal: Positive for arthralgias. Negative for gait problem, joint swelling, myalgias, neck pain and neck stiffness.   Skin: Positive for rash.   Neurological: Negative for dizziness, tremors, seizures, syncope, facial asymmetry, speech difficulty, weakness, light-headedness, numbness and headaches.   Psychiatric/Behavioral: Negative for agitation, behavioral problems, decreased concentration and dysphoric mood. The patient is not nervous/anxious and is not hyperactive.        Objective:        Vitals:    05/01/19 1418   BP: 126/80   Pulse: 76   Resp: 18     Exam:  Gen Appearance, well developed/nourished in no apparent distress  CV: 2+ distal pulses with no edema or swelling  Neuro:  MS: Awake, alert, oriented to place, person, time, situation. Sustains attention. Recent/remote memory intact, Language is full to spontaneous speech/repetition/naming/comprehension. Fund of Knowledge is full  CN: Optic discs are flat with normal vasculature, PERRL, Extraoccular movements and visual fields are full. Normal facial sensation and strength, Hearing symmetric, Tongue and Palate are midline and strong. Shoulder Shrug symmetric and strong.  Motor: Normal bulk, tone, no abnormal movements. 5/5 strength bilateral upper/lower extremities with 2+ reflexes and bilateral plantar response  Sensory: symmetric to light touch, pain, temp, and vibration/proprioception. Romberg negative.  Cerebellar: Finger-nose,Heal-shin, Rapid alternating movements intact  Gait: Normal stance, no ataxia    Labs: fasting glucose with PCP less than 100 in 2016.     Assessment:   Alejo Melvin is a 74 y.o. 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 He has HTN, HLD, MARYLU, and he has a pacemaker.   Plan:      I recommend:   1. Weight gain likely has contributed to his worsening neuropathic complaints; however, this remains tolerable, and he denies the need for treatment at this time.   2. Advised to call clinic with worsening complaints if he desires treatment, such as Cymbalta, Gabapentin, or compound cream.   3. He will see Ortho soon for management of his bilateral knee pain, which has contributed to inactivity, which has caused some weight gain.   4. No CT complaints currently. Will monitor.     FU 2 years.

## 2019-05-22 ENCOUNTER — OFFICE VISIT (OUTPATIENT)
Dept: UROLOGY | Facility: CLINIC | Age: 74
End: 2019-05-22
Attending: UROLOGY
Payer: MEDICARE

## 2019-05-22 VITALS
SYSTOLIC BLOOD PRESSURE: 154 MMHG | HEART RATE: 58 BPM | RESPIRATION RATE: 14 BRPM | DIASTOLIC BLOOD PRESSURE: 91 MMHG | BODY MASS INDEX: 34.65 KG/M2 | WEIGHT: 270 LBS | HEIGHT: 74 IN

## 2019-05-22 DIAGNOSIS — R97.20 ELEVATED PSA: Primary | ICD-10-CM

## 2019-05-22 PROCEDURE — 3077F PR MOST RECENT SYSTOLIC BLOOD PRESSURE >= 140 MM HG: ICD-10-PCS | Mod: S$GLB,,, | Performed by: UROLOGY

## 2019-05-22 PROCEDURE — 99204 PR OFFICE/OUTPT VISIT, NEW, LEVL IV, 45-59 MIN: ICD-10-PCS | Mod: S$GLB,,, | Performed by: UROLOGY

## 2019-05-22 PROCEDURE — 99999 PR PBB SHADOW E&M-EST. PATIENT-LVL III: ICD-10-PCS | Mod: PBBFAC,,, | Performed by: UROLOGY

## 2019-05-22 PROCEDURE — 3080F PR MOST RECENT DIASTOLIC BLOOD PRESSURE >= 90 MM HG: ICD-10-PCS | Mod: S$GLB,,, | Performed by: UROLOGY

## 2019-05-22 PROCEDURE — 99204 OFFICE O/P NEW MOD 45 MIN: CPT | Mod: S$GLB,,, | Performed by: UROLOGY

## 2019-05-22 PROCEDURE — 1101F PR PT FALLS ASSESS DOC 0-1 FALLS W/OUT INJ PAST YR: ICD-10-PCS | Mod: S$GLB,,, | Performed by: UROLOGY

## 2019-05-22 PROCEDURE — 3080F DIAST BP >= 90 MM HG: CPT | Mod: S$GLB,,, | Performed by: UROLOGY

## 2019-05-22 PROCEDURE — 1101F PT FALLS ASSESS-DOCD LE1/YR: CPT | Mod: S$GLB,,, | Performed by: UROLOGY

## 2019-05-22 PROCEDURE — 3077F SYST BP >= 140 MM HG: CPT | Mod: S$GLB,,, | Performed by: UROLOGY

## 2019-05-22 PROCEDURE — 99999 PR PBB SHADOW E&M-EST. PATIENT-LVL III: CPT | Mod: PBBFAC,,, | Performed by: UROLOGY

## 2019-05-22 NOTE — PROGRESS NOTES
Subjective:       Patient ID: Alejo Melvin is a 74 y.o. male.    Chief Complaint: Follow-up (elevated PSA )    HPI patient is here with an elevated PSA of 6.8.  He has some mild lower urinary tract symptoms.  Negative family history.  He takes Lasix and that causes him to have frequency no lower tract irritative symptoms    Past Medical History:   Diagnosis Date    Hyperlipidemia     Hypertension     Obstructive sleep apnea (adult) (pediatric)        Past Surgical History:   Procedure Laterality Date    CARDIAC PACEMAKER PLACEMENT  02/01/2015    COLONOSCOPY N/A 2/25/2014    [facility]formed [facility] at [facility]    Lt knee      rt heel         Family History   Problem Relation Age of Onset    Cancer Father        Social History     Socioeconomic History    Marital status:      Spouse name: Not on file    Number of children: Not on file    Years of education: Not on file    Highest education level: Not on file   Occupational History    Not on file   Social Needs    Financial resource strain: Not on file    Food insecurity:     Worry: Not on file     Inability: Not on file    Transportation needs:     Medical: Not on file     Non-medical: Not on file   Tobacco Use    Smoking status: Never Smoker    Smokeless tobacco: Never Used   Substance and Sexual Activity    Alcohol use: Yes     Comment: rarely    Drug use: No    Sexual activity: Yes     Partners: Female   Lifestyle    Physical activity:     Days per week: Not on file     Minutes per session: Not on file    Stress: Not on file   Relationships    Social connections:     Talks on phone: Not on file     Gets together: Not on file     Attends Confucianist service: Not on file     Active member of club or organization: Not on file     Attends meetings of clubs or organizations: Not on file     Relationship status: Not on file   Other Topics Concern    Not on file   Social History Narrative    Not on file        Allergies:  No known drug allergies    Medications:    Current Outpatient Medications:     amlodipine-benazepril 5-20 mg (LOTREL) 5-20 mg per capsule, Take 1 capsule by mouth once daily., Disp: 30 capsule, Rfl: 5    ascorbic acid (VITAMIN C) 1000 MG tablet, Take 1,000 mg by mouth once daily.  , Disp: , Rfl:     aspirin (ECOTRIN) 81 MG EC tablet, Take 1 tablet (81 mg total) by mouth 2 (two) times daily. (Patient taking differently: Take 81 mg by mouth once daily. ), Disp: , Rfl:     atorvastatin (LIPITOR) 40 MG tablet, Take 1 tablet (40 mg total) by mouth every evening., Disp: 30 tablet, Rfl: 5    cetirizine (ZYRTEC) 10 MG tablet, TAKE 1 TABLET BY MOUTH EVERY DAY, Disp: 30 tablet, Rfl: 5    cholecalciferol, vitamin D3, (VITAMIN D3) 2,000 unit Cap, Take 1 capsule by mouth once daily., Disp: , Rfl:     cyanocobalamin (VITAMIN B-12) 1000 MCG tablet, Take 100 mcg by mouth once daily., Disp: , Rfl:     fish oil-omega-3 fatty acids 300-1,000 mg capsule, Take 2 capsules (2 g total) by mouth once daily. (Patient taking differently: Take 3 g by mouth once daily. ), Disp: , Rfl:     fluticasone (FLONASE) 50 mcg/actuation nasal spray, 1 spray (50 mcg total) by Each Nare route daily as needed for Rhinitis., Disp: 1 Bottle, Rfl: 5    furosemide (LASIX) 40 MG tablet, Take 1.5 tablets (60 mg total) by mouth once daily., Disp: 45 tablet, Rfl: 5    GLUC STEPHENS/CHONDRO STEPHENS A/VIT C/MN (GLUCOSAMINE 1500 COMPLEX ORAL), Take 2 tablets by mouth once daily.  , Disp: , Rfl:     multivitamin capsule, Take 1 capsule by mouth once daily. , Disp: , Rfl:     sildenafil (REVATIO) 20 mg Tab, TAKE 3 -4 TABS ONCE DAILY AS NEEDED FOR RELATIONS, Disp: 30 tablet, Rfl: 4    Review of Systems   Constitutional: Negative for activity change, appetite change, chills, diaphoresis, fatigue, fever and unexpected weight change.   HENT: Negative for congestion, dental problem, hearing loss, mouth sores, postnasal drip, rhinorrhea, sinus pressure and  trouble swallowing.    Eyes: Negative for pain, discharge and itching.   Respiratory: Negative for apnea, cough, choking, chest tightness, shortness of breath and wheezing.    Cardiovascular: Negative for chest pain, palpitations and leg swelling.   Gastrointestinal: Negative for abdominal distention, abdominal pain, anal bleeding, blood in stool, constipation, diarrhea, nausea, rectal pain and vomiting.   Endocrine: Negative for polydipsia and polyuria.   Genitourinary: Negative for decreased urine volume, difficulty urinating, discharge, dysuria, enuresis, flank pain, frequency, genital sores, hematuria, penile pain, penile swelling, scrotal swelling, testicular pain and urgency.   Musculoskeletal: Negative for arthralgias, back pain and myalgias.   Skin: Negative for color change, rash and wound.   Neurological: Negative for dizziness, syncope, speech difficulty, light-headedness and headaches.   Hematological: Negative for adenopathy. Does not bruise/bleed easily.   Psychiatric/Behavioral: Negative for behavioral problems, confusion, hallucinations and sleep disturbance.       Objective:      Physical Exam   Constitutional: He appears well-developed.   HENT:   Head: Normocephalic.   Cardiovascular: Normal rate.    Pulmonary/Chest: Effort normal.   Abdominal: Soft.   Genitourinary: Prostate normal.   Genitourinary Comments: 35 g benign   Neurological: He is alert.   Skin: Skin is warm.     Psychiatric: He has a normal mood and affect.       Assessment:       1. Elevated PSA        Plan:       Alejo was seen today for follow-up.    Diagnoses and all orders for this visit:    Elevated PSA  -     Prostate Specific Antigen, Diagnostic; Future        we discussed the pros and cons of truss with biopsy in men in their 70s 80s and 90s in the indications for PSA is up until early age of 69.  We discussed pros and cons of a truss with biopsy.  Patient would like to treat conservatively and I agree with this.  I will see  him back in 4 months with a repeat PSA

## 2019-06-17 ENCOUNTER — OFFICE VISIT (OUTPATIENT)
Dept: FAMILY MEDICINE | Facility: CLINIC | Age: 74
End: 2019-06-17
Payer: MEDICARE

## 2019-06-17 VITALS
RESPIRATION RATE: 18 BRPM | SYSTOLIC BLOOD PRESSURE: 128 MMHG | WEIGHT: 279.38 LBS | HEART RATE: 60 BPM | HEIGHT: 73 IN | BODY MASS INDEX: 37.03 KG/M2 | DIASTOLIC BLOOD PRESSURE: 76 MMHG | TEMPERATURE: 97 F

## 2019-06-17 DIAGNOSIS — J40 BRONCHITIS: Primary | ICD-10-CM

## 2019-06-17 PROCEDURE — 99213 PR OFFICE/OUTPT VISIT, EST, LEVL III, 20-29 MIN: ICD-10-PCS | Mod: 25,S$GLB,, | Performed by: NURSE PRACTITIONER

## 2019-06-17 PROCEDURE — 3074F SYST BP LT 130 MM HG: CPT | Mod: S$GLB,,, | Performed by: NURSE PRACTITIONER

## 2019-06-17 PROCEDURE — 96372 PR INJECTION,THERAP/PROPH/DIAG2ST, IM OR SUBCUT: ICD-10-PCS | Mod: S$GLB,,, | Performed by: NURSE PRACTITIONER

## 2019-06-17 PROCEDURE — 99213 OFFICE O/P EST LOW 20 MIN: CPT | Mod: 25,S$GLB,, | Performed by: NURSE PRACTITIONER

## 2019-06-17 PROCEDURE — 96372 THER/PROPH/DIAG INJ SC/IM: CPT | Mod: S$GLB,,, | Performed by: NURSE PRACTITIONER

## 2019-06-17 PROCEDURE — 3078F PR MOST RECENT DIASTOLIC BLOOD PRESSURE < 80 MM HG: ICD-10-PCS | Mod: S$GLB,,, | Performed by: NURSE PRACTITIONER

## 2019-06-17 PROCEDURE — 99999 PR PBB SHADOW E&M-EST. PATIENT-LVL IV: CPT | Mod: PBBFAC,,, | Performed by: NURSE PRACTITIONER

## 2019-06-17 PROCEDURE — 3078F DIAST BP <80 MM HG: CPT | Mod: S$GLB,,, | Performed by: NURSE PRACTITIONER

## 2019-06-17 PROCEDURE — 1101F PR PT FALLS ASSESS DOC 0-1 FALLS W/OUT INJ PAST YR: ICD-10-PCS | Mod: S$GLB,,, | Performed by: NURSE PRACTITIONER

## 2019-06-17 PROCEDURE — 1101F PT FALLS ASSESS-DOCD LE1/YR: CPT | Mod: S$GLB,,, | Performed by: NURSE PRACTITIONER

## 2019-06-17 PROCEDURE — 3074F PR MOST RECENT SYSTOLIC BLOOD PRESSURE < 130 MM HG: ICD-10-PCS | Mod: S$GLB,,, | Performed by: NURSE PRACTITIONER

## 2019-06-17 PROCEDURE — 99999 PR PBB SHADOW E&M-EST. PATIENT-LVL IV: ICD-10-PCS | Mod: PBBFAC,,, | Performed by: NURSE PRACTITIONER

## 2019-06-17 RX ORDER — METHYLPREDNISOLONE ACETATE 40 MG/ML
40 INJECTION, SUSPENSION INTRA-ARTICULAR; INTRALESIONAL; INTRAMUSCULAR; SOFT TISSUE
Status: COMPLETED | OUTPATIENT
Start: 2019-06-17 | End: 2019-06-17

## 2019-06-17 RX ORDER — CEPHALEXIN 500 MG/1
500 CAPSULE ORAL EVERY 12 HOURS
Qty: 20 CAPSULE | Refills: 0 | Status: SHIPPED | OUTPATIENT
Start: 2019-06-17 | End: 2019-06-27

## 2019-06-17 RX ADMIN — METHYLPREDNISOLONE ACETATE 40 MG: 40 INJECTION, SUSPENSION INTRA-ARTICULAR; INTRALESIONAL; INTRAMUSCULAR; SOFT TISSUE at 05:06

## 2019-06-17 NOTE — PROGRESS NOTES
Subjective:       Patient ID: Alejo Melvin is a 74 y.o. male.    Chief Complaint: Nasal Congestion and Cough    Cough   The current episode started 1 to 4 weeks ago. The problem has been waxing and waning. The cough is productive of sputum. Associated symptoms include nasal congestion, postnasal drip, rhinorrhea, a sore throat and wheezing. Pertinent negatives include no chest pain, ear pain, fever, headaches, rash or shortness of breath.     Review of Systems   Constitutional: Negative.  Negative for appetite change, fatigue and fever.   HENT: Positive for congestion, postnasal drip, rhinorrhea and sore throat. Negative for ear pain.    Eyes: Negative.  Negative for visual disturbance.   Respiratory: Positive for cough and wheezing. Negative for shortness of breath.    Cardiovascular: Negative.  Negative for chest pain and palpitations.   Gastrointestinal: Negative.  Negative for abdominal pain, diarrhea, nausea and vomiting.   Genitourinary: Negative.  Negative for difficulty urinating.   Musculoskeletal: Negative.    Skin: Negative.  Negative for rash.   Neurological: Negative.  Negative for headaches.   Psychiatric/Behavioral: Negative.  Negative for sleep disturbance. The patient is not nervous/anxious.    All other systems reviewed and are negative.      Objective:      Physical Exam   Constitutional: He appears well-developed and well-nourished.   HENT:   Head: Normocephalic and atraumatic.   Right Ear: Tympanic membrane and external ear normal.   Left Ear: Tympanic membrane and external ear normal.   Nose: Mucosal edema and rhinorrhea present.   Mouth/Throat: Uvula is midline.   Eyes: Pupils are equal, round, and reactive to light. Conjunctivae are normal.   Neck: Trachea normal and normal range of motion. Neck supple. No thyromegaly present.   Cardiovascular: Normal rate, regular rhythm, S1 normal, S2 normal and normal heart sounds.   No murmur heard.  Pulmonary/Chest: Effort normal. No respiratory  distress. He has wheezes (and rhonchi throughout).   Abdominal: Soft. Normal appearance.   Musculoskeletal: Normal range of motion.   Lymphadenopathy:     He has no cervical adenopathy.   Neurological: He is alert.   Skin: Skin is warm, dry and intact.   Psychiatric: He has a normal mood and affect. His speech is normal.   Nursing note and vitals reviewed.      Assessment:       1. Bronchitis        Plan:   Alejo was seen today for nasal congestion and cough.    Diagnoses and all orders for this visit:    Bronchitis  -     methylPREDNISolone acetate injection 40 mg  -     cephALEXin (KEFLEX) 500 MG capsule; Take 1 capsule (500 mg total) by mouth every 12 (twelve) hours. for 10 days    RTC PRN

## 2019-07-01 ENCOUNTER — APPOINTMENT (OUTPATIENT)
Dept: RADIOLOGY | Facility: CLINIC | Age: 74
End: 2019-07-01
Attending: NURSE PRACTITIONER
Payer: MEDICARE

## 2019-07-01 ENCOUNTER — OFFICE VISIT (OUTPATIENT)
Dept: FAMILY MEDICINE | Facility: CLINIC | Age: 74
End: 2019-07-01
Payer: MEDICARE

## 2019-07-01 VITALS
DIASTOLIC BLOOD PRESSURE: 70 MMHG | SYSTOLIC BLOOD PRESSURE: 124 MMHG | HEART RATE: 68 BPM | BODY MASS INDEX: 36.76 KG/M2 | TEMPERATURE: 99 F | WEIGHT: 277.38 LBS | RESPIRATION RATE: 20 BRPM | HEIGHT: 73 IN

## 2019-07-01 DIAGNOSIS — J30.9 ALLERGIC RHINITIS: ICD-10-CM

## 2019-07-01 DIAGNOSIS — R05.3 PERSISTENT COUGH FOR 3 WEEKS OR LONGER: Primary | ICD-10-CM

## 2019-07-01 DIAGNOSIS — R05.3 PERSISTENT COUGH FOR 3 WEEKS OR LONGER: ICD-10-CM

## 2019-07-01 DIAGNOSIS — J40 BRONCHITIS WITH TRACHEITIS: ICD-10-CM

## 2019-07-01 PROCEDURE — 3078F PR MOST RECENT DIASTOLIC BLOOD PRESSURE < 80 MM HG: ICD-10-PCS | Mod: S$GLB,,, | Performed by: NURSE PRACTITIONER

## 2019-07-01 PROCEDURE — 3074F PR MOST RECENT SYSTOLIC BLOOD PRESSURE < 130 MM HG: ICD-10-PCS | Mod: S$GLB,,, | Performed by: NURSE PRACTITIONER

## 2019-07-01 PROCEDURE — 1101F PT FALLS ASSESS-DOCD LE1/YR: CPT | Mod: S$GLB,,, | Performed by: NURSE PRACTITIONER

## 2019-07-01 PROCEDURE — 3074F SYST BP LT 130 MM HG: CPT | Mod: S$GLB,,, | Performed by: NURSE PRACTITIONER

## 2019-07-01 PROCEDURE — 99999 PR PBB SHADOW E&M-EST. PATIENT-LVL IV: CPT | Mod: PBBFAC,,, | Performed by: NURSE PRACTITIONER

## 2019-07-01 PROCEDURE — 1101F PR PT FALLS ASSESS DOC 0-1 FALLS W/OUT INJ PAST YR: ICD-10-PCS | Mod: S$GLB,,, | Performed by: NURSE PRACTITIONER

## 2019-07-01 PROCEDURE — 3078F DIAST BP <80 MM HG: CPT | Mod: S$GLB,,, | Performed by: NURSE PRACTITIONER

## 2019-07-01 PROCEDURE — 71046 X-RAY EXAM CHEST 2 VIEWS: CPT | Mod: TC,PO

## 2019-07-01 PROCEDURE — 71046 X-RAY EXAM CHEST 2 VIEWS: CPT | Mod: 26,,, | Performed by: RADIOLOGY

## 2019-07-01 PROCEDURE — 99213 PR OFFICE/OUTPT VISIT, EST, LEVL III, 20-29 MIN: ICD-10-PCS | Mod: S$GLB,,, | Performed by: NURSE PRACTITIONER

## 2019-07-01 PROCEDURE — 99213 OFFICE O/P EST LOW 20 MIN: CPT | Mod: S$GLB,,, | Performed by: NURSE PRACTITIONER

## 2019-07-01 PROCEDURE — 99999 PR PBB SHADOW E&M-EST. PATIENT-LVL IV: ICD-10-PCS | Mod: PBBFAC,,, | Performed by: NURSE PRACTITIONER

## 2019-07-01 PROCEDURE — 71046 XR CHEST PA AND LATERAL: ICD-10-PCS | Mod: 26,,, | Performed by: RADIOLOGY

## 2019-07-01 RX ORDER — ALBUTEROL SULFATE 90 UG/1
2 AEROSOL, METERED RESPIRATORY (INHALATION) EVERY 6 HOURS PRN
Qty: 1 INHALER | Refills: 1 | Status: SHIPPED | OUTPATIENT
Start: 2019-07-01 | End: 2020-03-20 | Stop reason: SDUPTHER

## 2019-07-01 RX ORDER — PREDNISONE 10 MG/1
TABLET ORAL
Qty: 14 TABLET | Refills: 0 | Status: SHIPPED | OUTPATIENT
Start: 2019-07-01 | End: 2019-12-03

## 2019-07-01 RX ORDER — LEVOFLOXACIN 500 MG/1
500 TABLET, FILM COATED ORAL DAILY
Qty: 7 TABLET | Refills: 0 | Status: SHIPPED | OUTPATIENT
Start: 2019-07-01 | End: 2019-07-08

## 2019-07-01 RX ORDER — CETIRIZINE HYDROCHLORIDE 10 MG/1
TABLET ORAL
Qty: 30 TABLET | Refills: 5 | Status: SHIPPED | OUTPATIENT
Start: 2019-07-01 | End: 2020-04-30

## 2019-07-01 NOTE — PROGRESS NOTES
Subjective:       Patient ID: Alejo Melvin is a 74 y.o. male.    Chief Complaint: Follow-up (congestion)    Seen 2 weeks ago for chest congestion. Still not clear. Still coughing up mucus. Coughing fits.    Review of Systems   Constitutional: Negative.  Negative for appetite change, fatigue and fever.   HENT: Positive for congestion and postnasal drip. Negative for ear pain and sore throat.    Eyes: Negative.  Negative for visual disturbance.   Respiratory: Positive for cough. Negative for shortness of breath and wheezing.    Cardiovascular: Negative.  Negative for chest pain and palpitations.   Gastrointestinal: Negative.  Negative for abdominal pain, diarrhea, nausea and vomiting.   Genitourinary: Negative.  Negative for difficulty urinating.   Musculoskeletal: Negative.    Skin: Negative.  Negative for rash.   Neurological: Negative.  Negative for headaches.   Psychiatric/Behavioral: Negative.  Negative for sleep disturbance. The patient is not nervous/anxious.    All other systems reviewed and are negative.      Objective:      Physical Exam   Constitutional: He is oriented to person, place, and time. He appears well-developed and well-nourished.   HENT:   Head: Normocephalic and atraumatic.   Right Ear: External ear normal. A middle ear effusion is present.   Left Ear: External ear normal. A middle ear effusion is present.   Nose: Mucosal edema present.   Mouth/Throat: Oropharynx is clear and moist.   Eyes: Pupils are equal, round, and reactive to light. Conjunctivae and EOM are normal.   Neck: Normal range of motion. Neck supple.   Cardiovascular: Normal rate, regular rhythm and normal heart sounds.   No murmur heard.  Pulmonary/Chest: Effort normal and breath sounds normal. No respiratory distress.   Abdominal: Soft.   Musculoskeletal: Normal range of motion.   Neurological: He is alert and oriented to person, place, and time.   Skin: Skin is warm and dry.   Psychiatric: He has a normal mood and  affect.   Nursing note and vitals reviewed.      Assessment:       1. Persistent cough for 3 weeks or longer    2. Bronchitis with tracheitis        Plan:   Alejo was seen today for follow-up.    Diagnoses and all orders for this visit:    Persistent cough for 3 weeks or longer  -     X-Ray Chest PA And Lateral; Future  -     albuterol (VENTOLIN HFA) 90 mcg/actuation inhaler; Inhale 2 puffs into the lungs every 6 (six) hours as needed for Wheezing or Shortness of Breath (cough). Rescue    Bronchitis with tracheitis  -     levoFLOXacin (LEVAQUIN) 500 MG tablet; Take 1 tablet (500 mg total) by mouth once daily. for 7 days  -     predniSONE (DELTASONE) 10 MG tablet; 3 for 3 days, 2 for 2 days and 1 for 1 day  -     albuterol (VENTOLIN HFA) 90 mcg/actuation inhaler; Inhale 2 puffs into the lungs every 6 (six) hours as needed for Wheezing or Shortness of Breath (cough). Rescue    RTC 1 week for recheck

## 2019-07-05 ENCOUNTER — OFFICE VISIT (OUTPATIENT)
Dept: FAMILY MEDICINE | Facility: CLINIC | Age: 74
End: 2019-07-05
Payer: MEDICARE

## 2019-07-05 VITALS
HEIGHT: 73 IN | WEIGHT: 273 LBS | DIASTOLIC BLOOD PRESSURE: 70 MMHG | HEART RATE: 88 BPM | RESPIRATION RATE: 20 BRPM | SYSTOLIC BLOOD PRESSURE: 126 MMHG | BODY MASS INDEX: 36.18 KG/M2

## 2019-07-05 DIAGNOSIS — R05.3 PERSISTENT COUGH FOR 3 WEEKS OR LONGER: Primary | ICD-10-CM

## 2019-07-05 DIAGNOSIS — J45.41 MODERATE PERSISTENT REACTIVE AIRWAY DISEASE WITH ACUTE EXACERBATION: ICD-10-CM

## 2019-07-05 PROCEDURE — 99213 PR OFFICE/OUTPT VISIT, EST, LEVL III, 20-29 MIN: ICD-10-PCS | Mod: S$GLB,,, | Performed by: FAMILY MEDICINE

## 2019-07-05 PROCEDURE — 3078F DIAST BP <80 MM HG: CPT | Mod: S$GLB,,, | Performed by: FAMILY MEDICINE

## 2019-07-05 PROCEDURE — 3074F PR MOST RECENT SYSTOLIC BLOOD PRESSURE < 130 MM HG: ICD-10-PCS | Mod: S$GLB,,, | Performed by: FAMILY MEDICINE

## 2019-07-05 PROCEDURE — 99999 PR PBB SHADOW E&M-EST. PATIENT-LVL III: CPT | Mod: PBBFAC,,, | Performed by: FAMILY MEDICINE

## 2019-07-05 PROCEDURE — 3074F SYST BP LT 130 MM HG: CPT | Mod: S$GLB,,, | Performed by: FAMILY MEDICINE

## 2019-07-05 PROCEDURE — 1101F PT FALLS ASSESS-DOCD LE1/YR: CPT | Mod: S$GLB,,, | Performed by: FAMILY MEDICINE

## 2019-07-05 PROCEDURE — 99213 OFFICE O/P EST LOW 20 MIN: CPT | Mod: S$GLB,,, | Performed by: FAMILY MEDICINE

## 2019-07-05 PROCEDURE — 1101F PR PT FALLS ASSESS DOC 0-1 FALLS W/OUT INJ PAST YR: ICD-10-PCS | Mod: S$GLB,,, | Performed by: FAMILY MEDICINE

## 2019-07-05 PROCEDURE — 99999 PR PBB SHADOW E&M-EST. PATIENT-LVL III: ICD-10-PCS | Mod: PBBFAC,,, | Performed by: FAMILY MEDICINE

## 2019-07-05 PROCEDURE — 3078F PR MOST RECENT DIASTOLIC BLOOD PRESSURE < 80 MM HG: ICD-10-PCS | Mod: S$GLB,,, | Performed by: FAMILY MEDICINE

## 2019-07-05 RX ORDER — FLUTICASONE FUROATE AND VILANTEROL 100; 25 UG/1; UG/1
1 POWDER RESPIRATORY (INHALATION) DAILY
Qty: 1 EACH | Refills: 5 | Status: SHIPPED | OUTPATIENT
Start: 2019-07-05 | End: 2019-09-09

## 2019-07-05 NOTE — PROGRESS NOTES
Subjective:       Patient ID: Alejo eMlvin is a 74 y.o. male.    Chief Complaint: Bronchitis (4 day follow up)    Seen 2 weeks ago for chest congestion. Still not clear. Still coughing up mucus. Coughing fits.  He is better than last week.  Been on ventolin as needed, prednisone taper, Levaquin.  Has slight wheeze.     Review of Systems   Constitutional: Negative.  Negative for appetite change, fatigue and fever.   HENT: Positive for congestion and postnasal drip. Negative for ear pain and sore throat.    Eyes: Negative.  Negative for visual disturbance.   Respiratory: Positive for cough. Negative for shortness of breath and wheezing.    Cardiovascular: Negative.  Negative for chest pain and palpitations.   Gastrointestinal: Negative.  Negative for abdominal pain, diarrhea, nausea and vomiting.   Genitourinary: Negative.  Negative for difficulty urinating.   Musculoskeletal: Negative.    Skin: Negative.  Negative for rash.   Neurological: Negative.  Negative for headaches.   Psychiatric/Behavioral: Negative.  Negative for sleep disturbance. The patient is not nervous/anxious.    All other systems reviewed and are negative.      Objective:      Physical Exam   Constitutional: He is oriented to person, place, and time. He appears well-developed and well-nourished.   HENT:   Head: Normocephalic and atraumatic.   Right Ear: External ear normal. A middle ear effusion is present.   Left Ear: External ear normal. A middle ear effusion is present.   Nose: Mucosal edema present.   Mouth/Throat: Oropharynx is clear and moist.   Eyes: Pupils are equal, round, and reactive to light. Conjunctivae and EOM are normal.   Neck: Normal range of motion. Neck supple.   Cardiovascular: Normal rate, regular rhythm and normal heart sounds.   No murmur heard.  Pulmonary/Chest: Effort normal and breath sounds normal. No respiratory distress.   Abdominal: Soft.   Musculoskeletal: Normal range of motion.   Neurological: He is alert  and oriented to person, place, and time.   Skin: Skin is warm and dry.   Psychiatric: He has a normal mood and affect.   Nursing note and vitals reviewed.      Assessment:       1. Persistent cough for 3 weeks or longer    2. Moderate persistent reactive airway disease with acute exacerbation        Plan:   Alejo was seen today for follow-up.    Diagnoses and all orders for this visit:    Bronchitis with tracheitis  -     finish levoFLOXacin (LEVAQUIN) 500 MG tablet; Take 1 tablet (500 mg total) by mouth once daily. for 7 days  -     Finish predniSONE (DELTASONE) 10 MG tablet; 3 for 3 days, 2 for 2 days and 1 for 1 day  -     Continue albuterol (VENTOLIN HFA) 90 mcg/actuation inhaler; Inhale 2 puffs into the lungs every 6 (six) hours as needed for Wheezing or Shortness of Breath (cough).   -     Add Breo once daily    RTC as needed

## 2019-09-09 ENCOUNTER — OFFICE VISIT (OUTPATIENT)
Dept: FAMILY MEDICINE | Facility: CLINIC | Age: 74
End: 2019-09-09
Payer: MEDICARE

## 2019-09-09 VITALS
HEART RATE: 72 BPM | DIASTOLIC BLOOD PRESSURE: 70 MMHG | RESPIRATION RATE: 16 BRPM | SYSTOLIC BLOOD PRESSURE: 130 MMHG | HEIGHT: 73 IN | WEIGHT: 277 LBS | BODY MASS INDEX: 36.71 KG/M2

## 2019-09-09 DIAGNOSIS — Z91.89 AT RISK FOR HEPATITIS: ICD-10-CM

## 2019-09-09 DIAGNOSIS — I10 ESSENTIAL HYPERTENSION: ICD-10-CM

## 2019-09-09 DIAGNOSIS — R05.8 ACE-INHIBITOR COUGH: Primary | ICD-10-CM

## 2019-09-09 DIAGNOSIS — R05.3 PERSISTENT COUGH FOR 3 WEEKS OR LONGER: ICD-10-CM

## 2019-09-09 DIAGNOSIS — T46.4X5A ACE-INHIBITOR COUGH: Primary | ICD-10-CM

## 2019-09-09 PROCEDURE — 3078F PR MOST RECENT DIASTOLIC BLOOD PRESSURE < 80 MM HG: ICD-10-PCS | Mod: S$GLB,,, | Performed by: FAMILY MEDICINE

## 2019-09-09 PROCEDURE — 3078F DIAST BP <80 MM HG: CPT | Mod: S$GLB,,, | Performed by: FAMILY MEDICINE

## 2019-09-09 PROCEDURE — 99213 PR OFFICE/OUTPT VISIT, EST, LEVL III, 20-29 MIN: ICD-10-PCS | Mod: S$GLB,,, | Performed by: FAMILY MEDICINE

## 2019-09-09 PROCEDURE — 99999 PR PBB SHADOW E&M-EST. PATIENT-LVL III: CPT | Mod: PBBFAC,,, | Performed by: FAMILY MEDICINE

## 2019-09-09 PROCEDURE — 3075F PR MOST RECENT SYSTOLIC BLOOD PRESS GE 130-139MM HG: ICD-10-PCS | Mod: S$GLB,,, | Performed by: FAMILY MEDICINE

## 2019-09-09 PROCEDURE — 3075F SYST BP GE 130 - 139MM HG: CPT | Mod: S$GLB,,, | Performed by: FAMILY MEDICINE

## 2019-09-09 PROCEDURE — 99213 OFFICE O/P EST LOW 20 MIN: CPT | Mod: S$GLB,,, | Performed by: FAMILY MEDICINE

## 2019-09-09 PROCEDURE — 1101F PT FALLS ASSESS-DOCD LE1/YR: CPT | Mod: S$GLB,,, | Performed by: FAMILY MEDICINE

## 2019-09-09 PROCEDURE — 1101F PR PT FALLS ASSESS DOC 0-1 FALLS W/OUT INJ PAST YR: ICD-10-PCS | Mod: S$GLB,,, | Performed by: FAMILY MEDICINE

## 2019-09-09 PROCEDURE — 99999 PR PBB SHADOW E&M-EST. PATIENT-LVL III: ICD-10-PCS | Mod: PBBFAC,,, | Performed by: FAMILY MEDICINE

## 2019-09-09 RX ORDER — AMLODIPINE BESYLATE 5 MG/1
5 TABLET ORAL DAILY
Qty: 30 TABLET | Refills: 5 | Status: SHIPPED | OUTPATIENT
Start: 2019-09-09 | End: 2020-03-20 | Stop reason: SDUPTHER

## 2019-09-09 RX ORDER — HYDROCODONE BITARTRATE AND ACETAMINOPHEN 7.5; 325 MG/1; MG/1
1 TABLET ORAL EVERY 6 HOURS PRN
Refills: 0 | COMMUNITY
Start: 2019-08-19 | End: 2019-12-03

## 2019-09-09 RX ORDER — LOSARTAN POTASSIUM 50 MG/1
50 TABLET ORAL DAILY
Qty: 30 TABLET | Refills: 5 | Status: SHIPPED | OUTPATIENT
Start: 2019-09-09 | End: 2020-03-20 | Stop reason: SDUPTHER

## 2019-09-09 NOTE — PROGRESS NOTES
Subjective:       Patient ID: Alejo Melvin is a 74 y.o. male.    Chief Complaint: Cough (persistent, reoccurrent dry cough)    Seen 2 weeks ago for chest congestion. Still not clear. Still coughing up mucus. Coughing fits.  He is better than last week.  Been on ventolin as needed, prednisone taper, Levaquin.  Has slight wheeze.     Cough   This is a chronic problem. The current episode started more than 1 month ago. The problem has been waxing and waning. The problem occurs every few hours. The cough is productive of sputum. Associated symptoms include nasal congestion, postnasal drip and rhinorrhea. Pertinent negatives include no chest pain, chills, ear congestion, ear pain, fever, headaches, heartburn, hemoptysis, myalgias, rash, sore throat, shortness of breath, sweats, weight loss or wheezing. The symptoms are aggravated by dust, exercise and pollens. He has tried a beta-agonist inhaler for the symptoms. The treatment provided mild relief. His past medical history is significant for bronchitis and environmental allergies. There is no history of asthma, bronchiectasis, COPD, emphysema or pneumonia.     Review of Systems   Constitutional: Negative.  Negative for appetite change, chills, fatigue, fever and weight loss.   HENT: Positive for congestion, postnasal drip and rhinorrhea. Negative for ear pain and sore throat.    Eyes: Negative.  Negative for visual disturbance.   Respiratory: Positive for cough. Negative for hemoptysis, shortness of breath and wheezing.    Cardiovascular: Negative.  Negative for chest pain and palpitations.   Gastrointestinal: Negative.  Negative for abdominal pain, diarrhea, heartburn, nausea and vomiting.   Genitourinary: Negative.  Negative for difficulty urinating.   Musculoskeletal: Negative.  Negative for myalgias.   Skin: Negative.  Negative for rash.   Allergic/Immunologic: Positive for environmental allergies.   Neurological: Negative.  Negative for headaches.    Psychiatric/Behavioral: Negative.  Negative for sleep disturbance. The patient is not nervous/anxious.    All other systems reviewed and are negative.      Objective:      Physical Exam   Constitutional: He is oriented to person, place, and time. He appears well-developed and well-nourished.   HENT:   Head: Normocephalic and atraumatic.   Right Ear: External ear normal. A middle ear effusion is present.   Left Ear: External ear normal. A middle ear effusion is present.   Nose: Mucosal edema present.   Mouth/Throat: Oropharynx is clear and moist.   Eyes: Pupils are equal, round, and reactive to light. Conjunctivae and EOM are normal.   Neck: Normal range of motion. Neck supple.   Cardiovascular: Normal rate, regular rhythm and normal heart sounds.   No murmur heard.  Pulmonary/Chest: Effort normal and breath sounds normal. No respiratory distress.   Abdominal: Soft.   Musculoskeletal: Normal range of motion.   Neurological: He is alert and oriented to person, place, and time.   Skin: Skin is warm and dry.   Psychiatric: He has a normal mood and affect.   Nursing note and vitals reviewed.      Assessment:       1. ACE-inhibitor cough    2. At risk for hepatitis    3. Persistent cough for 3 weeks or longer    4. Essential hypertension        Plan:   Alejo was seen today for follow-up.    Diagnoses and all orders for this visit:    ACE-inhibitor cough  Stop Lotrel    At risk for hepatitis  -     Hepatitis C antibody; Future; Expected date: 09/09/2019    Persistent cough for 3 weeks or longer  Probably ACE Inhibitor effect  He also has chronic rhinosinusitis    Essential hypertension  -     amLODIPine (NORVASC) 5 MG tablet; Take 1 tablet (5 mg total) by mouth once daily.  Dispense: 30 tablet; Refill: 5  -     losartan (COZAAR) 50 MG tablet; Take 1 tablet (50 mg total) by mouth once daily.  Dispense: 30 tablet; Refill: 5    RTC 3 weeks to see if cough improves

## 2019-09-30 ENCOUNTER — OFFICE VISIT (OUTPATIENT)
Dept: FAMILY MEDICINE | Facility: CLINIC | Age: 74
End: 2019-09-30
Payer: MEDICARE

## 2019-09-30 VITALS
WEIGHT: 283.06 LBS | HEIGHT: 73 IN | RESPIRATION RATE: 16 BRPM | DIASTOLIC BLOOD PRESSURE: 84 MMHG | BODY MASS INDEX: 37.51 KG/M2 | SYSTOLIC BLOOD PRESSURE: 120 MMHG | HEART RATE: 66 BPM

## 2019-09-30 DIAGNOSIS — R05.3 PERSISTENT COUGH FOR 3 WEEKS OR LONGER: ICD-10-CM

## 2019-09-30 DIAGNOSIS — G47.33 OSA ON CPAP: ICD-10-CM

## 2019-09-30 DIAGNOSIS — J30.1 NON-SEASONAL ALLERGIC RHINITIS DUE TO POLLEN: ICD-10-CM

## 2019-09-30 DIAGNOSIS — J32.4 CHRONIC PANSINUSITIS: ICD-10-CM

## 2019-09-30 DIAGNOSIS — I10 ESSENTIAL HYPERTENSION: Primary | ICD-10-CM

## 2019-09-30 LAB
ALT SERPL W/O P-5'-P-CCNC: 32 U/L (ref 10–44)
ANION GAP SERPL CALC-SCNC: 9 MMOL/L (ref 8–16)
BUN SERPL-MCNC: 15 MG/DL (ref 8–23)
CALCIUM SERPL-MCNC: 9 MG/DL (ref 8.7–10.5)
CHLORIDE SERPL-SCNC: 109 MMOL/L (ref 95–110)
CHOLEST SERPL-MCNC: 191 MG/DL (ref 120–199)
CHOLEST/HDLC SERPL: 5.8 {RATIO} (ref 2–5)
CO2 SERPL-SCNC: 22 MMOL/L (ref 23–29)
CREAT SERPL-MCNC: 0.9 MG/DL (ref 0.5–1.4)
EST. GFR  (AFRICAN AMERICAN): >60 ML/MIN/1.73 M^2
EST. GFR  (NON AFRICAN AMERICAN): >60 ML/MIN/1.73 M^2
GLUCOSE SERPL-MCNC: 92 MG/DL (ref 70–110)
HDLC SERPL-MCNC: 33 MG/DL (ref 40–75)
HDLC SERPL: 17.3 % (ref 20–50)
LDLC SERPL CALC-MCNC: 127.2 MG/DL (ref 63–159)
NONHDLC SERPL-MCNC: 158 MG/DL
POTASSIUM SERPL-SCNC: 4.4 MMOL/L (ref 3.5–5.1)
SODIUM SERPL-SCNC: 140 MMOL/L (ref 136–145)
TRIGL SERPL-MCNC: 154 MG/DL (ref 30–150)

## 2019-09-30 PROCEDURE — 84460 ALANINE AMINO (ALT) (SGPT): CPT

## 2019-09-30 PROCEDURE — 3079F DIAST BP 80-89 MM HG: CPT | Mod: S$GLB,,, | Performed by: FAMILY MEDICINE

## 2019-09-30 PROCEDURE — 3079F PR MOST RECENT DIASTOLIC BLOOD PRESSURE 80-89 MM HG: ICD-10-PCS | Mod: S$GLB,,, | Performed by: FAMILY MEDICINE

## 2019-09-30 PROCEDURE — 1101F PT FALLS ASSESS-DOCD LE1/YR: CPT | Mod: S$GLB,,, | Performed by: FAMILY MEDICINE

## 2019-09-30 PROCEDURE — 99214 OFFICE O/P EST MOD 30 MIN: CPT | Mod: S$GLB,,, | Performed by: FAMILY MEDICINE

## 2019-09-30 PROCEDURE — 36415 COLL VENOUS BLD VENIPUNCTURE: CPT | Mod: S$GLB,,, | Performed by: FAMILY MEDICINE

## 2019-09-30 PROCEDURE — 99214 PR OFFICE/OUTPT VISIT, EST, LEVL IV, 30-39 MIN: ICD-10-PCS | Mod: S$GLB,,, | Performed by: FAMILY MEDICINE

## 2019-09-30 PROCEDURE — 3074F SYST BP LT 130 MM HG: CPT | Mod: S$GLB,,, | Performed by: FAMILY MEDICINE

## 2019-09-30 PROCEDURE — 80061 LIPID PANEL: CPT

## 2019-09-30 PROCEDURE — 80048 BASIC METABOLIC PNL TOTAL CA: CPT

## 2019-09-30 PROCEDURE — 99999 PR PBB SHADOW E&M-EST. PATIENT-LVL III: ICD-10-PCS | Mod: PBBFAC,,, | Performed by: FAMILY MEDICINE

## 2019-09-30 PROCEDURE — 99999 PR PBB SHADOW E&M-EST. PATIENT-LVL III: CPT | Mod: PBBFAC,,, | Performed by: FAMILY MEDICINE

## 2019-09-30 PROCEDURE — 3074F PR MOST RECENT SYSTOLIC BLOOD PRESSURE < 130 MM HG: ICD-10-PCS | Mod: S$GLB,,, | Performed by: FAMILY MEDICINE

## 2019-09-30 PROCEDURE — 36415 PR COLLECTION VENOUS BLOOD,VENIPUNCTURE: ICD-10-PCS | Mod: S$GLB,,, | Performed by: FAMILY MEDICINE

## 2019-09-30 PROCEDURE — 1101F PR PT FALLS ASSESS DOC 0-1 FALLS W/OUT INJ PAST YR: ICD-10-PCS | Mod: S$GLB,,, | Performed by: FAMILY MEDICINE

## 2019-09-30 RX ORDER — MONTELUKAST SODIUM 10 MG/1
10 TABLET ORAL NIGHTLY
Qty: 30 TABLET | Refills: 5 | Status: SHIPPED | OUTPATIENT
Start: 2019-09-30 | End: 2019-10-30

## 2019-09-30 NOTE — PROGRESS NOTES
Subjective:       Patient ID: Alejo Melvin is a 74 y.o. male.    Chief Complaint: Cough (3 week f/u)    Here for f/u for chronic cough.  Lisinopril stopped.  Still coughing but better.  Has cat in the house.  Suffers with chronic rhinosinusitis.  His coughing fits have improved nicely.   He is on maximum medical management for his sinusitis and probably needs sinus surgery.  He has had this before and is reluctant to go through it again for now.      Cough   This is a chronic problem. The current episode started more than 1 month ago. The problem has been waxing and waning. The problem occurs every few hours. The cough is productive of sputum. Associated symptoms include nasal congestion, postnasal drip and rhinorrhea. Pertinent negatives include no chest pain, chills, ear congestion, ear pain, fever, headaches, heartburn, hemoptysis, myalgias, rash, sore throat, shortness of breath, sweats, weight loss or wheezing. The symptoms are aggravated by dust, exercise and pollens. He has tried a beta-agonist inhaler for the symptoms. The treatment provided mild relief. His past medical history is significant for bronchitis and environmental allergies. There is no history of asthma, bronchiectasis, COPD, emphysema or pneumonia.     Review of Systems   Constitutional: Negative.  Negative for appetite change, chills, fatigue, fever and weight loss.   HENT: Positive for congestion, postnasal drip and rhinorrhea. Negative for ear pain and sore throat.    Eyes: Negative.  Negative for visual disturbance.   Respiratory: Positive for cough. Negative for hemoptysis, shortness of breath and wheezing.    Cardiovascular: Negative.  Negative for chest pain and palpitations.   Gastrointestinal: Negative.  Negative for abdominal pain, diarrhea, heartburn, nausea and vomiting.   Genitourinary: Negative.  Negative for difficulty urinating.   Musculoskeletal: Negative.  Negative for myalgias.   Skin: Negative.  Negative for rash.    Allergic/Immunologic: Positive for environmental allergies.   Neurological: Negative.  Negative for headaches.   Psychiatric/Behavioral: Negative.  Negative for sleep disturbance. The patient is not nervous/anxious.    All other systems reviewed and are negative.      Objective:       Vitals:    09/30/19 1110   BP: 120/84   Pulse: 66   Resp: 16       Physical Exam   Constitutional: He is oriented to person, place, and time. He appears well-developed and well-nourished.   HENT:   Head: Normocephalic and atraumatic.   Right Ear: External ear normal. A middle ear effusion is present.   Left Ear: External ear normal. A middle ear effusion is present.   Nose: Mucosal edema present.   Mouth/Throat: Oropharynx is clear and moist.   Eyes: Pupils are equal, round, and reactive to light. Conjunctivae and EOM are normal.   Neck: Normal range of motion. Neck supple.   Cardiovascular: Normal rate, regular rhythm and normal heart sounds.   No murmur heard.  Pulmonary/Chest: Effort normal and breath sounds normal. No respiratory distress.   Abdominal: Soft.   Musculoskeletal: Normal range of motion.   Neurological: He is alert and oriented to person, place, and time.   Skin: Skin is warm and dry.   Psychiatric: He has a normal mood and affect.   Nursing note and vitals reviewed.      BMP  Lab Results   Component Value Date     09/30/2019    K 4.4 09/30/2019     09/30/2019    CO2 22 (L) 09/30/2019    BUN 15 09/30/2019    CREATININE 0.9 09/30/2019    CALCIUM 9.0 09/30/2019    ANIONGAP 9 09/30/2019    ESTGFRAFRICA >60 09/30/2019    EGFRNONAA >60 09/30/2019     Lab Results   Component Value Date    LDLCALC 127.2 09/30/2019       Assessment:       1. Essential hypertension    2. MARYLU on CPAP    3. Non-seasonal allergic rhinitis due to pollen    4. Persistent cough for 3 weeks or longer    5. Chronic pansinusitis        Plan:   Alejo was seen today for follow-up.    Diagnoses and all orders for this visit:    ACE-inhibitor  cough  Stop Lotrel    Persistent cough for 3 weeks or longer  Probably ACE Inhibitor effect  He also has chronic rhinosinusitis    Essential hypertension  -     amLODIPine (NORVASC) 5 MG tablet; Take 1 tablet (5 mg total) by mouth once daily.  Dispense: 30 tablet; Refill: 5  -     losartan (COZAAR) 50 MG tablet; Take 1 tablet (50 mg total) by mouth once daily.  Dispense: 30 tablet; Refill: 5    Essential hypertension  -     ALT (SGPT); Future; Expected date: 09/30/2019  -     Basic metabolic panel; Future; Expected date: 09/30/2019  -     Lipid panel; Future; Expected date: 09/30/2019    MARYLU on CPAP    Non-seasonal allergic rhinitis due to pollen  -     montelukast (SINGULAIR) 10 mg tablet; Take 1 tablet (10 mg total) by mouth every evening.  Dispense: 30 tablet; Refill: 5    Persistent cough for 3 weeks or longer    Chronic pansinusitis  -     montelukast (SINGULAIR) 10 mg tablet; Take 1 tablet (10 mg total) by mouth every evening.  Dispense: 30 tablet; Refill: 5        RTC 6 months

## 2019-11-19 ENCOUNTER — PES CALL (OUTPATIENT)
Dept: ADMINISTRATIVE | Facility: CLINIC | Age: 74
End: 2019-11-19

## 2019-11-27 ENCOUNTER — PES CALL (OUTPATIENT)
Dept: ADMINISTRATIVE | Facility: CLINIC | Age: 74
End: 2019-11-27

## 2019-12-03 ENCOUNTER — OFFICE VISIT (OUTPATIENT)
Dept: INTERNAL MEDICINE | Facility: CLINIC | Age: 74
End: 2019-12-03
Payer: MEDICARE

## 2019-12-03 VITALS
RESPIRATION RATE: 16 BRPM | OXYGEN SATURATION: 98 % | HEART RATE: 60 BPM | SYSTOLIC BLOOD PRESSURE: 118 MMHG | BODY MASS INDEX: 36.02 KG/M2 | WEIGHT: 271.81 LBS | DIASTOLIC BLOOD PRESSURE: 82 MMHG | HEIGHT: 73 IN

## 2019-12-03 DIAGNOSIS — I10 ESSENTIAL HYPERTENSION: ICD-10-CM

## 2019-12-03 DIAGNOSIS — E78.2 MIXED HYPERLIPIDEMIA: ICD-10-CM

## 2019-12-03 DIAGNOSIS — G62.9 POLYNEUROPATHY: ICD-10-CM

## 2019-12-03 DIAGNOSIS — M17.12 PRIMARY OSTEOARTHRITIS OF LEFT KNEE: ICD-10-CM

## 2019-12-03 DIAGNOSIS — G47.33 OSA ON CPAP: ICD-10-CM

## 2019-12-03 DIAGNOSIS — Z00.00 ENCOUNTER FOR PREVENTIVE HEALTH EXAMINATION: Primary | ICD-10-CM

## 2019-12-03 DIAGNOSIS — Z95.0 PACEMAKER: ICD-10-CM

## 2019-12-03 PROCEDURE — G0439 PR MEDICARE ANNUAL WELLNESS SUBSEQUENT VISIT: ICD-10-PCS | Mod: S$GLB,,, | Performed by: NURSE PRACTITIONER

## 2019-12-03 PROCEDURE — 3079F PR MOST RECENT DIASTOLIC BLOOD PRESSURE 80-89 MM HG: ICD-10-PCS | Mod: S$GLB,,, | Performed by: NURSE PRACTITIONER

## 2019-12-03 PROCEDURE — 3079F DIAST BP 80-89 MM HG: CPT | Mod: S$GLB,,, | Performed by: NURSE PRACTITIONER

## 2019-12-03 PROCEDURE — 99999 PR PBB SHADOW E&M-EST. PATIENT-LVL IV: ICD-10-PCS | Mod: PBBFAC,,, | Performed by: NURSE PRACTITIONER

## 2019-12-03 PROCEDURE — G0439 PPPS, SUBSEQ VISIT: HCPCS | Mod: S$GLB,,, | Performed by: NURSE PRACTITIONER

## 2019-12-03 PROCEDURE — 3074F SYST BP LT 130 MM HG: CPT | Mod: S$GLB,,, | Performed by: NURSE PRACTITIONER

## 2019-12-03 PROCEDURE — 99999 PR PBB SHADOW E&M-EST. PATIENT-LVL IV: CPT | Mod: PBBFAC,,, | Performed by: NURSE PRACTITIONER

## 2019-12-03 PROCEDURE — 3074F PR MOST RECENT SYSTOLIC BLOOD PRESSURE < 130 MM HG: ICD-10-PCS | Mod: S$GLB,,, | Performed by: NURSE PRACTITIONER

## 2019-12-03 NOTE — PROGRESS NOTES
"Alejo Melvin presented for a  Medicare AWV and comprehensive Health Risk Assessment today. The following components were reviewed and updated:    · Medical history  · Family History  · Social history  · Allergies and Current Medications  · Health Risk Assessment  · Health Maintenance  · Care Team     ** See Completed Assessments for Annual Wellness Visit within the encounter summary.**       The following assessments were completed:  · Living Situation  · CAGE  · Depression Screening  · Timed Get Up and Go  · Whisper Test  · Cognitive Function Screening  · Nutrition Screening  · ADL Screening  · PAQ Screening    Vitals:    12/03/19 1056   BP: 118/82   Pulse: 60   Resp: 16   SpO2: 98%   Weight: 123.3 kg (271 lb 13.2 oz)   Height: 6' 1" (1.854 m)     Body mass index is 35.86 kg/m².  Physical Exam   Constitutional: He is oriented to person, place, and time. He appears well-developed and well-nourished.   HENT:   Head: Normocephalic and atraumatic.   Right Ear: External ear normal.   Left Ear: External ear normal.   Nose: Nose normal.   Mouth/Throat: Oropharynx is clear and moist. No oropharyngeal exudate.   Eyes: Pupils are equal, round, and reactive to light. EOM are normal.   Neck: Normal range of motion. Neck supple. No thyromegaly present.   Cardiovascular: Normal rate, regular rhythm and normal heart sounds.   No murmur heard.  Pulmonary/Chest: Effort normal and breath sounds normal. No respiratory distress. He has no decreased breath sounds. He has no wheezes. He has no rhonchi. He has no rales.   Abdominal: Soft. Bowel sounds are normal. He exhibits no distension and no mass. There is no tenderness. There is no guarding.   Musculoskeletal: Normal range of motion. He exhibits no edema.   Lymphadenopathy:     He has no cervical adenopathy.   Neurological: He is alert and oriented to person, place, and time. He exhibits normal muscle tone.   Skin: Skin is warm and dry. He is not diaphoretic. No pallor. "   Psychiatric: He has a normal mood and affect. His speech is normal and behavior is normal.   Nursing note and vitals reviewed.        Diagnoses and health risks identified today and associated recommendations/orders:    1. Encounter for preventive health examination  Declines pneumonia shots as well as flu  Discussed that he has never had chicken pox so Shingrix not needed  Did also discuss Tdap- he will think about this. Has appt with PCP in am and will discuss with him if he decides to get it    2. Essential hypertension  Well controlled per PCP cont norvasc and losartan    3. Mixed hyperlipidemia  Cont asa and lipitor     4. MARYLU on CPAP  Cont C pap    5. Primary osteoarthritis of left knee  Cont OTC remedies    6. Polyneuropathy  Cont f/u with neurology    7. Pacemaker  Rate well controlled      Provided Alejo with a 5-10 year written screening schedule and personal prevention plan. Recommendations were developed using the USPSTF age appropriate recommendations. Education, counseling, and referrals were provided as needed. After Visit Summary printed and given to patient which includes a list of additional screenings\tests needed.    No follow-ups on file.    Lily Mejia NP  I offered to discuss end of life issues, including information on how to make advance directives that the patient could use to name someone who would make medical decisions on their behalf if they became too ill to make themselves.    ___Patient declined  _X_Patient is interested, I provided paper work and offered to discuss.

## 2019-12-03 NOTE — PATIENT INSTRUCTIONS
Counseling and Referral of Other Preventative  (Italic type indicates deductible and co-insurance are waived)    Patient Name: Alejo Melvin  Today's Date: 12/3/2019    Health Maintenance       Date Due Completion Date    Shingles Vaccine (1 of 2) 04/27/1995 ---    Influenza Vaccine (1) 09/01/2019 10/1/2011    Pneumococcal Vaccine (65+ Low/Medium Risk) (1 of 2 - PCV13) 04/24/2020 (Originally 4/27/2010) ---    Lipid Panel 09/30/2020 9/30/2019    Colonoscopy 02/05/2024 2/5/2014 (Declined)    Override on 2/5/2014: Declined (will discuss with physician)    TETANUS VACCINE 04/07/2027 4/7/2017 (Declined)    Override on 4/7/2017: Declined        No orders of the defined types were placed in this encounter.    The following information is provided to all patients.  This information is to help you find resources for any of the problems found today that may be affecting your health:                Living healthy guide: www.[facility].[place].gov      Understanding Diabetes: www.diabetes.org      Eating healthy: www.cdc.gov/healthyweight      CDC home safety checklist: www.cdc.gov/steadi/patient.html      Agency on Aging: www.goea.[place].gov      Alcoholics anonymous (AA): www.aa.org      Physical Activity: www.ar.nih.gov/bb9tumj      Tobacco use: www.quitwithusla.org

## 2019-12-04 ENCOUNTER — OFFICE VISIT (OUTPATIENT)
Dept: FAMILY MEDICINE | Facility: CLINIC | Age: 74
End: 2019-12-04
Payer: MEDICARE

## 2019-12-04 VITALS
HEIGHT: 73 IN | HEART RATE: 68 BPM | DIASTOLIC BLOOD PRESSURE: 80 MMHG | WEIGHT: 271.38 LBS | RESPIRATION RATE: 16 BRPM | SYSTOLIC BLOOD PRESSURE: 134 MMHG | BODY MASS INDEX: 35.97 KG/M2

## 2019-12-04 DIAGNOSIS — M54.81 OCCIPITAL NEURALGIA OF RIGHT SIDE: Primary | ICD-10-CM

## 2019-12-04 PROCEDURE — 85025 COMPLETE CBC W/AUTO DIFF WBC: CPT

## 2019-12-04 PROCEDURE — 99214 OFFICE O/P EST MOD 30 MIN: CPT | Mod: S$GLB,,, | Performed by: FAMILY MEDICINE

## 2019-12-04 PROCEDURE — 1125F AMNT PAIN NOTED PAIN PRSNT: CPT | Mod: S$GLB,,, | Performed by: FAMILY MEDICINE

## 2019-12-04 PROCEDURE — 99999 PR PBB SHADOW E&M-EST. PATIENT-LVL III: CPT | Mod: PBBFAC,,, | Performed by: FAMILY MEDICINE

## 2019-12-04 PROCEDURE — 3079F PR MOST RECENT DIASTOLIC BLOOD PRESSURE 80-89 MM HG: ICD-10-PCS | Mod: S$GLB,,, | Performed by: FAMILY MEDICINE

## 2019-12-04 PROCEDURE — 1101F PT FALLS ASSESS-DOCD LE1/YR: CPT | Mod: S$GLB,,, | Performed by: FAMILY MEDICINE

## 2019-12-04 PROCEDURE — 36415 COLL VENOUS BLD VENIPUNCTURE: CPT | Mod: S$GLB,,, | Performed by: FAMILY MEDICINE

## 2019-12-04 PROCEDURE — 1159F MED LIST DOCD IN RCRD: CPT | Mod: S$GLB,,, | Performed by: FAMILY MEDICINE

## 2019-12-04 PROCEDURE — 1159F PR MEDICATION LIST DOCUMENTED IN MEDICAL RECORD: ICD-10-PCS | Mod: S$GLB,,, | Performed by: FAMILY MEDICINE

## 2019-12-04 PROCEDURE — 3075F PR MOST RECENT SYSTOLIC BLOOD PRESS GE 130-139MM HG: ICD-10-PCS | Mod: S$GLB,,, | Performed by: FAMILY MEDICINE

## 2019-12-04 PROCEDURE — 99999 PR PBB SHADOW E&M-EST. PATIENT-LVL III: ICD-10-PCS | Mod: PBBFAC,,, | Performed by: FAMILY MEDICINE

## 2019-12-04 PROCEDURE — 3075F SYST BP GE 130 - 139MM HG: CPT | Mod: S$GLB,,, | Performed by: FAMILY MEDICINE

## 2019-12-04 PROCEDURE — 99214 PR OFFICE/OUTPT VISIT, EST, LEVL IV, 30-39 MIN: ICD-10-PCS | Mod: S$GLB,,, | Performed by: FAMILY MEDICINE

## 2019-12-04 PROCEDURE — 36415 PR COLLECTION VENOUS BLOOD,VENIPUNCTURE: ICD-10-PCS | Mod: S$GLB,,, | Performed by: FAMILY MEDICINE

## 2019-12-04 PROCEDURE — 3079F DIAST BP 80-89 MM HG: CPT | Mod: S$GLB,,, | Performed by: FAMILY MEDICINE

## 2019-12-04 PROCEDURE — 1125F PR PAIN SEVERITY QUANTIFIED, PAIN PRESENT: ICD-10-PCS | Mod: S$GLB,,, | Performed by: FAMILY MEDICINE

## 2019-12-04 PROCEDURE — 80053 COMPREHEN METABOLIC PANEL: CPT

## 2019-12-04 PROCEDURE — 1101F PR PT FALLS ASSESS DOC 0-1 FALLS W/OUT INJ PAST YR: ICD-10-PCS | Mod: S$GLB,,, | Performed by: FAMILY MEDICINE

## 2019-12-04 RX ORDER — GABAPENTIN 300 MG/1
300 CAPSULE ORAL 2 TIMES DAILY
Qty: 60 CAPSULE | Refills: 2 | Status: SHIPPED | OUTPATIENT
Start: 2019-12-04 | End: 2021-03-18

## 2019-12-04 NOTE — PROGRESS NOTES
Subjective:       Patient ID: Alejo Melvin is a 74 y.o. male.    Chief Complaint: Head Pain (has an area behind his R ear that is painful, worse when lightly touching. No rash)    Burning pain in right occiput for 7 days.  Applying pressure makes it feel better.  Pain is not severe but it is annoying.  He denies change in vision, weakness or numbness in his lower extremities.  He has severe chronic sinus allergies.    Review of Systems   Constitutional: Negative for fatigue and fever.   HENT: Positive for congestion, postnasal drip, rhinorrhea, sinus pressure and sneezing.    Eyes: Positive for itching.   Respiratory: Negative for cough and wheezing.    Cardiovascular: Negative for chest pain and palpitations.   Gastrointestinal: Negative for diarrhea, nausea and vomiting.   Genitourinary: Negative.    Musculoskeletal: Negative.    Skin: Negative.    Neurological: Positive for headaches. Negative for dizziness.   Hematological: Negative for adenopathy.   Psychiatric/Behavioral: Negative.        Objective:      Vitals:    12/04/19 1304   BP: 134/80   Pulse: 68   Resp: 16     Physical Exam   Constitutional: He is oriented to person, place, and time. He appears well-developed and well-nourished.   HENT:   Head: Normocephalic and atraumatic.       Right Ear: Tympanic membrane, external ear and ear canal normal.   Left Ear: Tympanic membrane, external ear and ear canal normal.   Nose: Nose normal.   Mouth/Throat: Oropharynx is clear and moist.   Eyes: Pupils are equal, round, and reactive to light. Conjunctivae and EOM are normal.   Neck: Normal range of motion. Neck supple. No tracheal deviation present. No thyromegaly present.   Cardiovascular: Normal rate, regular rhythm, normal heart sounds and intact distal pulses. Exam reveals no gallop.   No murmur heard.  Pulmonary/Chest: Effort normal and breath sounds normal.   Abdominal: Soft. Bowel sounds are normal. He exhibits no distension and no mass. There is no  tenderness. There is no rebound and no guarding. Hernia confirmed negative in the right inguinal area and confirmed negative in the left inguinal area.   Musculoskeletal: Normal range of motion.   Neurological: He is alert and oriented to person, place, and time. He has normal reflexes. He displays normal reflexes. No cranial nerve deficit or sensory deficit. He exhibits normal muscle tone. Coordination normal.   Skin: Skin is warm and dry.   Psychiatric: He has a normal mood and affect. His behavior is normal. Judgment and thought content normal.       Assessment:       1. Occipital neuralgia of right side        Plan:   Alejo was seen today for head pain.    Diagnoses and all orders for this visit:    Occipital neuralgia of right side  -     CT Head W Wo Contrast; Future  -     gabapentin (NEURONTIN) 300 MG capsule; Take 1 capsule (300 mg total) by mouth 2 (two) times daily.  -     Comprehensive metabolic panel; Future  -     CBC auto differential; Future

## 2019-12-05 LAB
ALBUMIN SERPL BCP-MCNC: 4.4 G/DL (ref 3.5–5.2)
ALP SERPL-CCNC: 89 U/L (ref 55–135)
ALT SERPL W/O P-5'-P-CCNC: 36 U/L (ref 10–44)
ANION GAP SERPL CALC-SCNC: 10 MMOL/L (ref 8–16)
AST SERPL-CCNC: 26 U/L (ref 10–40)
BASOPHILS # BLD AUTO: 0.06 K/UL (ref 0–0.2)
BASOPHILS NFR BLD: 1.1 % (ref 0–1.9)
BILIRUB SERPL-MCNC: 0.7 MG/DL (ref 0.1–1)
BUN SERPL-MCNC: 20 MG/DL (ref 8–23)
CALCIUM SERPL-MCNC: 9.6 MG/DL (ref 8.7–10.5)
CHLORIDE SERPL-SCNC: 103 MMOL/L (ref 95–110)
CO2 SERPL-SCNC: 25 MMOL/L (ref 23–29)
CREAT SERPL-MCNC: 1.1 MG/DL (ref 0.5–1.4)
DIFFERENTIAL METHOD: NORMAL
EOSINOPHIL # BLD AUTO: 0.1 K/UL (ref 0–0.5)
EOSINOPHIL NFR BLD: 2 % (ref 0–8)
ERYTHROCYTE [DISTWIDTH] IN BLOOD BY AUTOMATED COUNT: 12.5 % (ref 11.5–14.5)
EST. GFR  (AFRICAN AMERICAN): >60 ML/MIN/1.73 M^2
EST. GFR  (NON AFRICAN AMERICAN): >60 ML/MIN/1.73 M^2
GLUCOSE SERPL-MCNC: 85 MG/DL (ref 70–110)
HCT VFR BLD AUTO: 49.2 % (ref 40–54)
HGB BLD-MCNC: 15.9 G/DL (ref 14–18)
IMM GRANULOCYTES # BLD AUTO: 0.01 K/UL (ref 0–0.04)
IMM GRANULOCYTES NFR BLD AUTO: 0.2 % (ref 0–0.5)
LYMPHOCYTES # BLD AUTO: 1.8 K/UL (ref 1–4.8)
LYMPHOCYTES NFR BLD: 33.5 % (ref 18–48)
MCH RBC QN AUTO: 30.9 PG (ref 27–31)
MCHC RBC AUTO-ENTMCNC: 32.3 G/DL (ref 32–36)
MCV RBC AUTO: 96 FL (ref 82–98)
MONOCYTES # BLD AUTO: 0.5 K/UL (ref 0.3–1)
MONOCYTES NFR BLD: 8.8 % (ref 4–15)
NEUTROPHILS # BLD AUTO: 2.9 K/UL (ref 1.8–7.7)
NEUTROPHILS NFR BLD: 54.4 % (ref 38–73)
NRBC BLD-RTO: 0 /100 WBC
PLATELET # BLD AUTO: 224 K/UL (ref 150–350)
PMV BLD AUTO: 11.3 FL (ref 9.2–12.9)
POTASSIUM SERPL-SCNC: 4.8 MMOL/L (ref 3.5–5.1)
PROT SERPL-MCNC: 7.5 G/DL (ref 6–8.4)
RBC # BLD AUTO: 5.14 M/UL (ref 4.6–6.2)
SODIUM SERPL-SCNC: 138 MMOL/L (ref 136–145)
WBC # BLD AUTO: 5.37 K/UL (ref 3.9–12.7)

## 2019-12-09 ENCOUNTER — HOSPITAL ENCOUNTER (OUTPATIENT)
Dept: RADIOLOGY | Facility: HOSPITAL | Age: 74
Discharge: HOME | End: 2019-12-09
Attending: FAMILY MEDICINE
Payer: MEDICARE

## 2019-12-09 DIAGNOSIS — M54.81 OCCIPITAL NEURALGIA OF RIGHT SIDE: ICD-10-CM

## 2019-12-09 PROCEDURE — 70470 CT HEAD/BRAIN W/O & W/DYE: CPT | Mod: 26,,, | Performed by: RADIOLOGY

## 2019-12-09 PROCEDURE — 70470 CT HEAD WITH AND WITHOUT: ICD-10-PCS | Mod: 26,,, | Performed by: RADIOLOGY

## 2019-12-09 PROCEDURE — 25500020 PHARM REV CODE 255: Performed by: FAMILY MEDICINE

## 2019-12-09 PROCEDURE — 70470 CT HEAD/BRAIN W/O & W/DYE: CPT | Mod: TC

## 2019-12-09 RX ADMIN — IOHEXOL 100 ML: 350 INJECTION, SOLUTION INTRAVENOUS at 09:12

## 2019-12-10 ENCOUNTER — TELEPHONE (OUTPATIENT)
Dept: FAMILY MEDICINE | Facility: CLINIC | Age: 74
End: 2019-12-10

## 2019-12-10 NOTE — TELEPHONE ENCOUNTER
PA received for gabapentin 300mg    Key: [id number]   PA Case ID: [id number]   Rx #: [id number]    Approved today  CaseId:[id number]  Status:Approved  Review Type:Prior Auth  Coverage Start Date:11/10/2019  Coverage End Date:12/09/2020    Pharmacy notified, they will dispense this for the patient.

## 2019-12-16 ENCOUNTER — OFFICE VISIT (OUTPATIENT)
Dept: FAMILY MEDICINE | Facility: CLINIC | Age: 74
End: 2019-12-16
Payer: MEDICARE

## 2019-12-16 VITALS
BODY MASS INDEX: 36 KG/M2 | SYSTOLIC BLOOD PRESSURE: 124 MMHG | DIASTOLIC BLOOD PRESSURE: 82 MMHG | HEIGHT: 73 IN | RESPIRATION RATE: 16 BRPM | HEART RATE: 76 BPM | WEIGHT: 271.63 LBS

## 2019-12-16 DIAGNOSIS — M54.81 OCCIPITAL NEURALGIA OF RIGHT SIDE: Primary | ICD-10-CM

## 2019-12-16 PROCEDURE — 3079F PR MOST RECENT DIASTOLIC BLOOD PRESSURE 80-89 MM HG: ICD-10-PCS | Mod: S$GLB,,, | Performed by: FAMILY MEDICINE

## 2019-12-16 PROCEDURE — 99213 PR OFFICE/OUTPT VISIT, EST, LEVL III, 20-29 MIN: ICD-10-PCS | Mod: S$GLB,,, | Performed by: FAMILY MEDICINE

## 2019-12-16 PROCEDURE — 1159F MED LIST DOCD IN RCRD: CPT | Mod: S$GLB,,, | Performed by: FAMILY MEDICINE

## 2019-12-16 PROCEDURE — 1101F PT FALLS ASSESS-DOCD LE1/YR: CPT | Mod: S$GLB,,, | Performed by: FAMILY MEDICINE

## 2019-12-16 PROCEDURE — 3079F DIAST BP 80-89 MM HG: CPT | Mod: S$GLB,,, | Performed by: FAMILY MEDICINE

## 2019-12-16 PROCEDURE — 1125F AMNT PAIN NOTED PAIN PRSNT: CPT | Mod: S$GLB,,, | Performed by: FAMILY MEDICINE

## 2019-12-16 PROCEDURE — 99213 OFFICE O/P EST LOW 20 MIN: CPT | Mod: S$GLB,,, | Performed by: FAMILY MEDICINE

## 2019-12-16 PROCEDURE — 99999 PR PBB SHADOW E&M-EST. PATIENT-LVL III: CPT | Mod: PBBFAC,,, | Performed by: FAMILY MEDICINE

## 2019-12-16 PROCEDURE — 1159F PR MEDICATION LIST DOCUMENTED IN MEDICAL RECORD: ICD-10-PCS | Mod: S$GLB,,, | Performed by: FAMILY MEDICINE

## 2019-12-16 PROCEDURE — 3074F PR MOST RECENT SYSTOLIC BLOOD PRESSURE < 130 MM HG: ICD-10-PCS | Mod: S$GLB,,, | Performed by: FAMILY MEDICINE

## 2019-12-16 PROCEDURE — 99999 PR PBB SHADOW E&M-EST. PATIENT-LVL III: ICD-10-PCS | Mod: PBBFAC,,, | Performed by: FAMILY MEDICINE

## 2019-12-16 PROCEDURE — 3074F SYST BP LT 130 MM HG: CPT | Mod: S$GLB,,, | Performed by: FAMILY MEDICINE

## 2019-12-16 PROCEDURE — 1101F PR PT FALLS ASSESS DOC 0-1 FALLS W/OUT INJ PAST YR: ICD-10-PCS | Mod: S$GLB,,, | Performed by: FAMILY MEDICINE

## 2019-12-16 PROCEDURE — 1125F PR PAIN SEVERITY QUANTIFIED, PAIN PRESENT: ICD-10-PCS | Mod: S$GLB,,, | Performed by: FAMILY MEDICINE

## 2019-12-16 NOTE — PROGRESS NOTES
Subjective:       Patient ID: Alejo Melvin is a 74 y.o. male.    Chief Complaint: Follow-up (2 week recheck)    Here for follow up for burning pain in right occiput.  Applying pressure makes it feel better.  Pain is not severe but it is annoying.  He denies change in vision, weakness or numbness in his lower extremities.  He has severe chronic sinus allergies.  CT was normal.  Gabapentin is helping.  Pain has decreased since stopping.    Follow-up   Associated symptoms include congestion and headaches. Pertinent negatives include no chest pain, coughing, fatigue, fever, nausea or vomiting.     Review of Systems   Constitutional: Negative for fatigue and fever.   HENT: Positive for congestion, postnasal drip, rhinorrhea, sinus pressure and sneezing.    Eyes: Positive for itching.   Respiratory: Negative for cough and wheezing.    Cardiovascular: Negative for chest pain and palpitations.   Gastrointestinal: Negative for diarrhea, nausea and vomiting.   Genitourinary: Negative.    Musculoskeletal: Negative.    Skin: Negative.    Neurological: Positive for headaches. Negative for dizziness.   Hematological: Negative for adenopathy.   Psychiatric/Behavioral: Negative.        Objective:      Vitals:    12/16/19 0926   BP: 124/82   Pulse: 76   Resp: 16     Physical Exam   Constitutional: He is oriented to person, place, and time. He appears well-developed and well-nourished.   HENT:   Head: Normocephalic and atraumatic.       Right Ear: Tympanic membrane, external ear and ear canal normal.   Left Ear: Tympanic membrane, external ear and ear canal normal.   Nose: Nose normal.   Mouth/Throat: Oropharynx is clear and moist.   Eyes: Pupils are equal, round, and reactive to light. Conjunctivae and EOM are normal.   Neck: Normal range of motion. Neck supple. No tracheal deviation present. No thyromegaly present.   Cardiovascular: Normal rate, regular rhythm, normal heart sounds and intact distal pulses. Exam reveals no  gallop.   No murmur heard.  Pulmonary/Chest: Effort normal and breath sounds normal.   Abdominal: Soft. Bowel sounds are normal. He exhibits no distension and no mass. There is no tenderness. There is no rebound and no guarding. Hernia confirmed negative in the right inguinal area and confirmed negative in the left inguinal area.   Musculoskeletal: Normal range of motion.   Neurological: He is alert and oriented to person, place, and time. He has normal reflexes. He displays normal reflexes. No cranial nerve deficit or sensory deficit. He exhibits normal muscle tone. Coordination normal.   Skin: Skin is warm and dry.   Psychiatric: He has a normal mood and affect. His behavior is normal. Judgment and thought content normal.       Assessment:       1. Occipital neuralgia of right side        Plan:   Alejo was seen today for follow-up.    Diagnoses and all orders for this visit:    Occipital neuralgia of right side    Continue Gabapentin  Keep appt in [facility]

## 2019-12-19 DIAGNOSIS — E78.2 MIXED HYPERLIPIDEMIA: ICD-10-CM

## 2019-12-19 RX ORDER — ATORVASTATIN CALCIUM 40 MG/1
40 TABLET, FILM COATED ORAL NIGHTLY
Qty: 30 TABLET | Refills: 5 | Status: SHIPPED | OUTPATIENT
Start: 2019-12-19 | End: 2020-03-20 | Stop reason: SDUPTHER

## 2019-12-19 NOTE — TELEPHONE ENCOUNTER
----- Message from Constance Sparks sent at 2019 10:20 AM CST -----  Contact: Fax  Alejo Melvin  MRN: [id number]  : 1945  PCP: Leroy Alston  Home Phone      [phone]  Work Phone      Not on file.  Mobile          [phone]      MESSAGE:   Pt requesting refill or new Rx.   Is this a refill or new RX:  refill  RX name and strength: atorvastatin (LIPITOR) 40 MG tablet  Last office visit: 19  Is this a 30-day or 90-day RX:  30  Pharmacy name and location:  [facility]  Comments:      Phone:  [phone]

## 2019-12-30 ENCOUNTER — TELEPHONE (OUTPATIENT)
Dept: ADMINISTRATIVE | Facility: HOSPITAL | Age: 74
End: 2019-12-30

## 2020-03-20 ENCOUNTER — OFFICE VISIT (OUTPATIENT)
Dept: FAMILY MEDICINE | Facility: CLINIC | Age: 75
End: 2020-03-20
Payer: MEDICARE

## 2020-03-20 VITALS
RESPIRATION RATE: 18 BRPM | HEART RATE: 64 BPM | DIASTOLIC BLOOD PRESSURE: 60 MMHG | SYSTOLIC BLOOD PRESSURE: 102 MMHG | BODY MASS INDEX: 36.17 KG/M2 | WEIGHT: 258.38 LBS | HEIGHT: 71 IN

## 2020-03-20 DIAGNOSIS — E78.2 MIXED HYPERLIPIDEMIA: ICD-10-CM

## 2020-03-20 DIAGNOSIS — R97.20 ELEVATED PSA, LESS THAN 10 NG/ML: Primary | ICD-10-CM

## 2020-03-20 DIAGNOSIS — Z91.89 AT RISK FOR HEPATITIS: ICD-10-CM

## 2020-03-20 DIAGNOSIS — J33.9 NASAL POLYP: ICD-10-CM

## 2020-03-20 DIAGNOSIS — G47.33 OSA ON CPAP: ICD-10-CM

## 2020-03-20 DIAGNOSIS — I10 ESSENTIAL HYPERTENSION: ICD-10-CM

## 2020-03-20 DIAGNOSIS — R05.3 PERSISTENT COUGH FOR 3 WEEKS OR LONGER: ICD-10-CM

## 2020-03-20 DIAGNOSIS — J40 BRONCHITIS WITH TRACHEITIS: ICD-10-CM

## 2020-03-20 LAB
ALBUMIN SERPL BCP-MCNC: 4 G/DL (ref 3.5–5.2)
ALP SERPL-CCNC: 86 U/L (ref 55–135)
ALT SERPL W/O P-5'-P-CCNC: 28 U/L (ref 10–44)
ANION GAP SERPL CALC-SCNC: 11 MMOL/L (ref 8–16)
AST SERPL-CCNC: 21 U/L (ref 10–40)
BILIRUB SERPL-MCNC: 0.5 MG/DL (ref 0.1–1)
BUN SERPL-MCNC: 22 MG/DL (ref 8–23)
CALCIUM SERPL-MCNC: 9.3 MG/DL (ref 8.7–10.5)
CHLORIDE SERPL-SCNC: 105 MMOL/L (ref 95–110)
CHOLEST SERPL-MCNC: 140 MG/DL (ref 120–199)
CHOLEST/HDLC SERPL: 4.8 {RATIO} (ref 2–5)
CO2 SERPL-SCNC: 25 MMOL/L (ref 23–29)
COMPLEXED PSA SERPL-MCNC: 10 NG/ML (ref 0–4)
CREAT SERPL-MCNC: 1 MG/DL (ref 0.5–1.4)
EST. GFR  (AFRICAN AMERICAN): >60 ML/MIN/1.73 M^2
EST. GFR  (NON AFRICAN AMERICAN): >60 ML/MIN/1.73 M^2
GLUCOSE SERPL-MCNC: 90 MG/DL (ref 70–110)
HDLC SERPL-MCNC: 29 MG/DL (ref 40–75)
HDLC SERPL: 20.7 % (ref 20–50)
LDLC SERPL CALC-MCNC: 89.8 MG/DL (ref 63–159)
NONHDLC SERPL-MCNC: 111 MG/DL
POTASSIUM SERPL-SCNC: 4.1 MMOL/L (ref 3.5–5.1)
PROT SERPL-MCNC: 7.2 G/DL (ref 6–8.4)
SODIUM SERPL-SCNC: 141 MMOL/L (ref 136–145)
TRIGL SERPL-MCNC: 106 MG/DL (ref 30–150)

## 2020-03-20 PROCEDURE — 99214 PR OFFICE/OUTPT VISIT, EST, LEVL IV, 30-39 MIN: ICD-10-PCS | Mod: S$GLB,,, | Performed by: FAMILY MEDICINE

## 2020-03-20 PROCEDURE — 80053 COMPREHEN METABOLIC PANEL: CPT

## 2020-03-20 PROCEDURE — 99499 RISK ADDL DX/OHS AUDIT: ICD-10-PCS | Mod: S$GLB,,, | Performed by: FAMILY MEDICINE

## 2020-03-20 PROCEDURE — 1159F PR MEDICATION LIST DOCUMENTED IN MEDICAL RECORD: ICD-10-PCS | Mod: S$GLB,,, | Performed by: FAMILY MEDICINE

## 2020-03-20 PROCEDURE — 3074F SYST BP LT 130 MM HG: CPT | Mod: S$GLB,,, | Performed by: FAMILY MEDICINE

## 2020-03-20 PROCEDURE — 1125F AMNT PAIN NOTED PAIN PRSNT: CPT | Mod: S$GLB,,, | Performed by: FAMILY MEDICINE

## 2020-03-20 PROCEDURE — 1101F PR PT FALLS ASSESS DOC 0-1 FALLS W/OUT INJ PAST YR: ICD-10-PCS | Mod: S$GLB,,, | Performed by: FAMILY MEDICINE

## 2020-03-20 PROCEDURE — 84153 ASSAY OF PSA TOTAL: CPT

## 2020-03-20 PROCEDURE — 99999 PR PBB SHADOW E&M-EST. PATIENT-LVL III: ICD-10-PCS | Mod: PBBFAC,,, | Performed by: FAMILY MEDICINE

## 2020-03-20 PROCEDURE — 80061 LIPID PANEL: CPT

## 2020-03-20 PROCEDURE — 99499 UNLISTED E&M SERVICE: CPT | Mod: S$GLB,,, | Performed by: FAMILY MEDICINE

## 2020-03-20 PROCEDURE — 3078F DIAST BP <80 MM HG: CPT | Mod: S$GLB,,, | Performed by: FAMILY MEDICINE

## 2020-03-20 PROCEDURE — 86803 HEPATITIS C AB TEST: CPT

## 2020-03-20 PROCEDURE — 1101F PT FALLS ASSESS-DOCD LE1/YR: CPT | Mod: S$GLB,,, | Performed by: FAMILY MEDICINE

## 2020-03-20 PROCEDURE — 99999 PR PBB SHADOW E&M-EST. PATIENT-LVL III: CPT | Mod: PBBFAC,,, | Performed by: FAMILY MEDICINE

## 2020-03-20 PROCEDURE — 36415 PR COLLECTION VENOUS BLOOD,VENIPUNCTURE: ICD-10-PCS | Mod: S$GLB,,, | Performed by: FAMILY MEDICINE

## 2020-03-20 PROCEDURE — 3074F PR MOST RECENT SYSTOLIC BLOOD PRESSURE < 130 MM HG: ICD-10-PCS | Mod: S$GLB,,, | Performed by: FAMILY MEDICINE

## 2020-03-20 PROCEDURE — 1125F PR PAIN SEVERITY QUANTIFIED, PAIN PRESENT: ICD-10-PCS | Mod: S$GLB,,, | Performed by: FAMILY MEDICINE

## 2020-03-20 PROCEDURE — 3078F PR MOST RECENT DIASTOLIC BLOOD PRESSURE < 80 MM HG: ICD-10-PCS | Mod: S$GLB,,, | Performed by: FAMILY MEDICINE

## 2020-03-20 PROCEDURE — 1159F MED LIST DOCD IN RCRD: CPT | Mod: S$GLB,,, | Performed by: FAMILY MEDICINE

## 2020-03-20 PROCEDURE — 99214 OFFICE O/P EST MOD 30 MIN: CPT | Mod: S$GLB,,, | Performed by: FAMILY MEDICINE

## 2020-03-20 PROCEDURE — 36415 COLL VENOUS BLD VENIPUNCTURE: CPT | Mod: S$GLB,,, | Performed by: FAMILY MEDICINE

## 2020-03-20 RX ORDER — ATORVASTATIN CALCIUM 40 MG/1
40 TABLET, FILM COATED ORAL NIGHTLY
Qty: 30 TABLET | Refills: 5 | Status: SHIPPED | OUTPATIENT
Start: 2020-03-20 | End: 2020-09-14 | Stop reason: SDUPTHER

## 2020-03-20 RX ORDER — MONTELUKAST SODIUM 10 MG/1
10 TABLET ORAL NIGHTLY
COMMUNITY
Start: 2020-02-06 | End: 2020-06-08

## 2020-03-20 RX ORDER — AMLODIPINE BESYLATE 5 MG/1
5 TABLET ORAL DAILY
Qty: 30 TABLET | Refills: 5 | Status: SHIPPED | OUTPATIENT
Start: 2020-03-20 | End: 2020-09-14 | Stop reason: SDUPTHER

## 2020-03-20 RX ORDER — LOSARTAN POTASSIUM 50 MG/1
50 TABLET ORAL DAILY
Qty: 30 TABLET | Refills: 5 | Status: SHIPPED | OUTPATIENT
Start: 2020-03-20 | End: 2020-09-14 | Stop reason: SDUPTHER

## 2020-03-20 RX ORDER — ALBUTEROL SULFATE 90 UG/1
2 AEROSOL, METERED RESPIRATORY (INHALATION) EVERY 6 HOURS PRN
Qty: 18 G | Refills: 5 | Status: SHIPPED | OUTPATIENT
Start: 2020-03-20 | End: 2020-03-26 | Stop reason: CLARIF

## 2020-03-20 RX ORDER — FLUTICASONE PROPIONATE 50 MCG
2 SPRAY, SUSPENSION (ML) NASAL DAILY PRN
Qty: 16 G | Refills: 5 | Status: SHIPPED | OUTPATIENT
Start: 2020-03-20 | End: 2021-03-19 | Stop reason: SDUPTHER

## 2020-03-20 RX ORDER — FUROSEMIDE 40 MG/1
60 TABLET ORAL DAILY
Qty: 45 TABLET | Refills: 5 | Status: SHIPPED | OUTPATIENT
Start: 2020-03-20 | End: 2020-09-14 | Stop reason: SDUPTHER
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
## 2020-03-20 NOTE — PROGRESS NOTES
Patient, Alejo Melvin (MRN #[id number]), presented with a recorded BMI of 36.04 kg/m^2 and a documented comorbidity(s):  - Obstructive Sleep Apnea  - Hypertension  - Hyperlipidemia  to which the severe obesity is a contributing factor. This is consistent with the definition of severe obesity (BMI 35.0-39.9) with comorbidity (ICD-10 E66.01, Z68.35). The patient's severe obesity was monitored, evaluated, addressed and/or treated. This addendum to the medical record is made on 03/20/2020.

## 2020-03-23 LAB — HCV AB SERPL QL IA: NEGATIVE

## 2020-03-25 DIAGNOSIS — J45.41 MODERATE PERSISTENT REACTIVE AIRWAY DISEASE WITH ACUTE EXACERBATION: Primary | ICD-10-CM

## 2020-03-25 NOTE — TELEPHONE ENCOUNTER
Received notification from [facility] that the albuterol HFA inhaler is a formulary exclusion on this patient's plan.  If you want to give me some names of options for other medications, I can search the formulary to see what meds are covered and which are not.

## 2020-03-26 RX ORDER — ALBUTEROL SULFATE 90 UG/1
2 AEROSOL, METERED RESPIRATORY (INHALATION) EVERY 6 HOURS PRN
Qty: 17 G | Refills: 5 | Status: SHIPPED | OUTPATIENT
Start: 2020-03-26 | End: 2023-10-02

## 2020-03-26 NOTE — TELEPHONE ENCOUNTER
Moderate persistent reactive airway disease with acute exacerbation  -     PROAIR HFA 90 mcg/actuation inhaler; Inhale 2 puffs into the lungs every 6 (six) hours as needed for Wheezing. Rescue  Dispense: 17 g; Refill: 5

## 2020-03-26 NOTE — TELEPHONE ENCOUNTER
Brand ProAir HFA is preferred but at a quantity limit of 17g per 30 days. Can we try this instead?

## 2020-04-30 DIAGNOSIS — J30.9 ALLERGIC RHINITIS: ICD-10-CM

## 2020-04-30 RX ORDER — CETIRIZINE HYDROCHLORIDE 10 MG/1
TABLET ORAL
Qty: 30 TABLET | Refills: 5 | Status: SHIPPED | OUTPATIENT
Start: 2020-04-30 | End: 2020-11-24

## 2020-05-08 RX ORDER — SILDENAFIL CITRATE 20 MG/1
TABLET ORAL
Qty: 30 TABLET | Refills: 4 | Status: SHIPPED | OUTPATIENT
Start: 2020-05-08 | End: 2020-12-28

## 2020-06-02 ENCOUNTER — TELEPHONE (OUTPATIENT)
Dept: FAMILY MEDICINE | Facility: CLINIC | Age: 75
End: 2020-06-02

## 2020-06-02 NOTE — TELEPHONE ENCOUNTER
PA received for sildenafil 20mg tabs    Key: [id number]  PA Case ID: [id number]  Rx #: [id number]    Submitted to [facility]. Awaiting response.

## 2020-09-14 ENCOUNTER — OFFICE VISIT (OUTPATIENT)
Dept: FAMILY MEDICINE | Facility: CLINIC | Age: 75
End: 2020-09-14
Payer: MEDICARE

## 2020-09-14 VITALS
WEIGHT: 272 LBS | DIASTOLIC BLOOD PRESSURE: 86 MMHG | RESPIRATION RATE: 13 BRPM | HEIGHT: 73 IN | BODY MASS INDEX: 36.05 KG/M2 | SYSTOLIC BLOOD PRESSURE: 142 MMHG | HEART RATE: 70 BPM

## 2020-09-14 DIAGNOSIS — G62.9 POLYNEUROPATHY: ICD-10-CM

## 2020-09-14 DIAGNOSIS — G47.33 OSA ON CPAP: ICD-10-CM

## 2020-09-14 DIAGNOSIS — E78.2 MIXED HYPERLIPIDEMIA: ICD-10-CM

## 2020-09-14 DIAGNOSIS — I10 ESSENTIAL HYPERTENSION: Primary | ICD-10-CM

## 2020-09-14 DIAGNOSIS — R97.20 ELEVATED PSA, LESS THAN 10 NG/ML: ICD-10-CM

## 2020-09-14 LAB
ALBUMIN SERPL BCP-MCNC: 3.9 G/DL (ref 3.5–5.2)
ALP SERPL-CCNC: 75 U/L (ref 55–135)
ALT SERPL W/O P-5'-P-CCNC: 33 U/L (ref 10–44)
ANION GAP SERPL CALC-SCNC: 10 MMOL/L (ref 8–16)
AST SERPL-CCNC: 22 U/L (ref 10–40)
BASOPHILS # BLD AUTO: 0.04 K/UL (ref 0–0.2)
BASOPHILS NFR BLD: 0.7 % (ref 0–1.9)
BILIRUB SERPL-MCNC: 0.5 MG/DL (ref 0.1–1)
BUN SERPL-MCNC: 19 MG/DL (ref 8–23)
CALCIUM SERPL-MCNC: 9.3 MG/DL (ref 8.7–10.5)
CHLORIDE SERPL-SCNC: 105 MMOL/L (ref 95–110)
CHOLEST SERPL-MCNC: 152 MG/DL (ref 120–199)
CHOLEST/HDLC SERPL: 3.9 {RATIO} (ref 2–5)
CO2 SERPL-SCNC: 27 MMOL/L (ref 23–29)
CREAT SERPL-MCNC: 1 MG/DL (ref 0.5–1.4)
DIFFERENTIAL METHOD: ABNORMAL
EOSINOPHIL # BLD AUTO: 0.2 K/UL (ref 0–0.5)
EOSINOPHIL NFR BLD: 3.2 % (ref 0–8)
ERYTHROCYTE [DISTWIDTH] IN BLOOD BY AUTOMATED COUNT: 12.6 % (ref 11.5–14.5)
EST. GFR  (AFRICAN AMERICAN): >60 ML/MIN/1.73 M^2
EST. GFR  (NON AFRICAN AMERICAN): >60 ML/MIN/1.73 M^2
GLUCOSE SERPL-MCNC: 94 MG/DL (ref 70–110)
HCT VFR BLD AUTO: 44.8 % (ref 40–54)
HDLC SERPL-MCNC: 39 MG/DL (ref 40–75)
HDLC SERPL: 25.7 % (ref 20–50)
HGB BLD-MCNC: 15 G/DL (ref 14–18)
IMM GRANULOCYTES # BLD AUTO: 0 K/UL (ref 0–0.04)
IMM GRANULOCYTES NFR BLD AUTO: 0 % (ref 0–0.5)
LDLC SERPL CALC-MCNC: 87.4 MG/DL (ref 63–159)
LYMPHOCYTES # BLD AUTO: 1.8 K/UL (ref 1–4.8)
LYMPHOCYTES NFR BLD: 32.6 % (ref 18–48)
MCH RBC QN AUTO: 32 PG (ref 27–31)
MCHC RBC AUTO-ENTMCNC: 33.5 G/DL (ref 32–36)
MCV RBC AUTO: 96 FL (ref 82–98)
MONOCYTES # BLD AUTO: 0.5 K/UL (ref 0.3–1)
MONOCYTES NFR BLD: 9.1 % (ref 4–15)
NEUTROPHILS # BLD AUTO: 3.1 K/UL (ref 1.8–7.7)
NEUTROPHILS NFR BLD: 54.4 % (ref 38–73)
NONHDLC SERPL-MCNC: 113 MG/DL
NRBC BLD-RTO: 0 /100 WBC
PLATELET # BLD AUTO: 227 K/UL (ref 150–350)
PMV BLD AUTO: 9.9 FL (ref 9.2–12.9)
POTASSIUM SERPL-SCNC: 4.1 MMOL/L (ref 3.5–5.1)
PROT SERPL-MCNC: 7.2 G/DL (ref 6–8.4)
RBC # BLD AUTO: 4.69 M/UL (ref 4.6–6.2)
SODIUM SERPL-SCNC: 142 MMOL/L (ref 136–145)
TRIGL SERPL-MCNC: 128 MG/DL (ref 30–150)
WBC # BLD AUTO: 5.61 K/UL (ref 3.9–12.7)

## 2020-09-14 PROCEDURE — 1159F MED LIST DOCD IN RCRD: CPT | Mod: S$GLB,,, | Performed by: FAMILY MEDICINE

## 2020-09-14 PROCEDURE — 1125F AMNT PAIN NOTED PAIN PRSNT: CPT | Mod: S$GLB,,, | Performed by: FAMILY MEDICINE

## 2020-09-14 PROCEDURE — 1159F PR MEDICATION LIST DOCUMENTED IN MEDICAL RECORD: ICD-10-PCS | Mod: S$GLB,,, | Performed by: FAMILY MEDICINE

## 2020-09-14 PROCEDURE — 99999 PR PBB SHADOW E&M-EST. PATIENT-LVL IV: CPT | Mod: PBBFAC,,, | Performed by: FAMILY MEDICINE

## 2020-09-14 PROCEDURE — 90694 VACC AIIV4 NO PRSRV 0.5ML IM: CPT | Mod: S$GLB,,, | Performed by: FAMILY MEDICINE

## 2020-09-14 PROCEDURE — 99214 OFFICE O/P EST MOD 30 MIN: CPT | Mod: 25,S$GLB,, | Performed by: FAMILY MEDICINE

## 2020-09-14 PROCEDURE — 1101F PR PT FALLS ASSESS DOC 0-1 FALLS W/OUT INJ PAST YR: ICD-10-PCS | Mod: S$GLB,,, | Performed by: FAMILY MEDICINE

## 2020-09-14 PROCEDURE — 99214 PR OFFICE/OUTPT VISIT, EST, LEVL IV, 30-39 MIN: ICD-10-PCS | Mod: 25,S$GLB,, | Performed by: FAMILY MEDICINE

## 2020-09-14 PROCEDURE — 85025 COMPLETE CBC W/AUTO DIFF WBC: CPT

## 2020-09-14 PROCEDURE — 99999 PR PBB SHADOW E&M-EST. PATIENT-LVL IV: ICD-10-PCS | Mod: PBBFAC,,, | Performed by: FAMILY MEDICINE

## 2020-09-14 PROCEDURE — 1125F PR PAIN SEVERITY QUANTIFIED, PAIN PRESENT: ICD-10-PCS | Mod: S$GLB,,, | Performed by: FAMILY MEDICINE

## 2020-09-14 PROCEDURE — G0008 PR ADMIN INFLUENZA VIRUS VAC: ICD-10-PCS | Mod: S$GLB,,, | Performed by: FAMILY MEDICINE

## 2020-09-14 PROCEDURE — 3079F DIAST BP 80-89 MM HG: CPT | Mod: S$GLB,,, | Performed by: FAMILY MEDICINE

## 2020-09-14 PROCEDURE — 80061 LIPID PANEL: CPT

## 2020-09-14 PROCEDURE — G0008 ADMIN INFLUENZA VIRUS VAC: HCPCS | Mod: S$GLB,,, | Performed by: FAMILY MEDICINE

## 2020-09-14 PROCEDURE — 3079F PR MOST RECENT DIASTOLIC BLOOD PRESSURE 80-89 MM HG: ICD-10-PCS | Mod: S$GLB,,, | Performed by: FAMILY MEDICINE

## 2020-09-14 PROCEDURE — 3077F PR MOST RECENT SYSTOLIC BLOOD PRESSURE >= 140 MM HG: ICD-10-PCS | Mod: S$GLB,,, | Performed by: FAMILY MEDICINE

## 2020-09-14 PROCEDURE — 1101F PT FALLS ASSESS-DOCD LE1/YR: CPT | Mod: S$GLB,,, | Performed by: FAMILY MEDICINE

## 2020-09-14 PROCEDURE — 80053 COMPREHEN METABOLIC PANEL: CPT

## 2020-09-14 PROCEDURE — 3077F SYST BP >= 140 MM HG: CPT | Mod: S$GLB,,, | Performed by: FAMILY MEDICINE

## 2020-09-14 PROCEDURE — 90694 FLU VACCINE - QUADRIVALENT - ADJUVANTED: ICD-10-PCS | Mod: S$GLB,,, | Performed by: FAMILY MEDICINE

## 2020-09-14 RX ORDER — LOSARTAN POTASSIUM 50 MG/1
50 TABLET ORAL DAILY
Qty: 30 TABLET | Refills: 5 | Status: SHIPPED | OUTPATIENT
Start: 2020-09-14 | End: 2021-03-19 | Stop reason: SDUPTHER

## 2020-09-14 RX ORDER — FUROSEMIDE 40 MG/1
80 TABLET ORAL DAILY
Qty: 60 TABLET | Refills: 5 | Status: SHIPPED | OUTPATIENT
Start: 2020-09-14 | End: 2021-03-19 | Stop reason: SDUPTHER

## 2020-09-14 RX ORDER — AMLODIPINE BESYLATE 5 MG/1
5 TABLET ORAL DAILY
Qty: 30 TABLET | Refills: 5 | Status: SHIPPED | OUTPATIENT
Start: 2020-09-14 | End: 2021-03-19 | Stop reason: SDUPTHER

## 2020-09-14 RX ORDER — ATORVASTATIN CALCIUM 40 MG/1
40 TABLET, FILM COATED ORAL NIGHTLY
Qty: 30 TABLET | Refills: 5 | Status: SHIPPED | OUTPATIENT
Start: 2020-09-14 | End: 2021-03-19 | Stop reason: SDUPTHER
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
## 2020-10-14 ENCOUNTER — TELEPHONE (OUTPATIENT)
Dept: FAMILY MEDICINE | Facility: CLINIC | Age: 75
End: 2020-10-14

## 2020-11-19 ENCOUNTER — PATIENT OUTREACH (OUTPATIENT)
Dept: ADMINISTRATIVE | Facility: OTHER | Age: 75
End: 2020-11-19

## 2020-11-19 ENCOUNTER — PES CALL (OUTPATIENT)
Dept: ADMINISTRATIVE | Facility: CLINIC | Age: 75
End: 2020-11-19

## 2020-11-19 NOTE — PROGRESS NOTES
Updates were requested from care everywhere.  Chart was reviewed for overdue Proactive [facility] Encounters ([facility]) topics (CRS, Breast Cancer Screening, Eye exam)  Health Maintenance has been updated.  LINKS not responding.

## 2020-11-25 ENCOUNTER — OFFICE VISIT (OUTPATIENT)
Dept: ORTHOPEDICS | Facility: CLINIC | Age: 75
End: 2020-11-25
Payer: MEDICARE

## 2020-11-25 VITALS
BODY MASS INDEX: 37.49 KG/M2 | HEIGHT: 73 IN | DIASTOLIC BLOOD PRESSURE: 82 MMHG | RESPIRATION RATE: 16 BRPM | OXYGEN SATURATION: 98 % | HEART RATE: 78 BPM | SYSTOLIC BLOOD PRESSURE: 128 MMHG | TEMPERATURE: 98 F | WEIGHT: 282.88 LBS

## 2020-11-25 DIAGNOSIS — Z00.00 ENCOUNTER FOR PREVENTIVE HEALTH EXAMINATION: Primary | ICD-10-CM

## 2020-11-25 PROCEDURE — G0439 PR MEDICARE ANNUAL WELLNESS SUBSEQUENT VISIT: ICD-10-PCS | Mod: S$GLB,,, | Performed by: PHYSICIAN ASSISTANT

## 2020-11-25 PROCEDURE — 3074F SYST BP LT 130 MM HG: CPT | Mod: S$GLB,,, | Performed by: PHYSICIAN ASSISTANT

## 2020-11-25 PROCEDURE — 3288F PR FALLS RISK ASSESSMENT DOCUMENTED: ICD-10-PCS | Mod: S$GLB,,, | Performed by: PHYSICIAN ASSISTANT

## 2020-11-25 PROCEDURE — 99999 PR PBB SHADOW E&M-EST. PATIENT-LVL V: ICD-10-PCS | Mod: PBBFAC,,, | Performed by: PHYSICIAN ASSISTANT

## 2020-11-25 PROCEDURE — G0439 PPPS, SUBSEQ VISIT: HCPCS | Mod: S$GLB,,, | Performed by: PHYSICIAN ASSISTANT

## 2020-11-25 PROCEDURE — 3074F PR MOST RECENT SYSTOLIC BLOOD PRESSURE < 130 MM HG: ICD-10-PCS | Mod: S$GLB,,, | Performed by: PHYSICIAN ASSISTANT

## 2020-11-25 PROCEDURE — 99999 PR PBB SHADOW E&M-EST. PATIENT-LVL V: CPT | Mod: PBBFAC,,, | Performed by: PHYSICIAN ASSISTANT

## 2020-11-25 PROCEDURE — 3079F PR MOST RECENT DIASTOLIC BLOOD PRESSURE 80-89 MM HG: ICD-10-PCS | Mod: S$GLB,,, | Performed by: PHYSICIAN ASSISTANT

## 2020-11-25 PROCEDURE — 3288F FALL RISK ASSESSMENT DOCD: CPT | Mod: S$GLB,,, | Performed by: PHYSICIAN ASSISTANT

## 2020-11-25 PROCEDURE — 1125F AMNT PAIN NOTED PAIN PRSNT: CPT | Mod: S$GLB,,, | Performed by: PHYSICIAN ASSISTANT

## 2020-11-25 PROCEDURE — 1101F PT FALLS ASSESS-DOCD LE1/YR: CPT | Mod: S$GLB,,, | Performed by: PHYSICIAN ASSISTANT

## 2020-11-25 PROCEDURE — 1125F PR PAIN SEVERITY QUANTIFIED, PAIN PRESENT: ICD-10-PCS | Mod: S$GLB,,, | Performed by: PHYSICIAN ASSISTANT

## 2020-11-25 PROCEDURE — 3079F DIAST BP 80-89 MM HG: CPT | Mod: S$GLB,,, | Performed by: PHYSICIAN ASSISTANT

## 2020-11-25 PROCEDURE — 1101F PR PT FALLS ASSESS DOC 0-1 FALLS W/OUT INJ PAST YR: ICD-10-PCS | Mod: S$GLB,,, | Performed by: PHYSICIAN ASSISTANT

## 2020-11-25 RX ORDER — FINASTERIDE 5 MG/1
5 TABLET, FILM COATED ORAL DAILY
COMMUNITY
Start: 2020-09-29

## 2020-11-25 NOTE — Clinical Note
Patient due for pneumonia and flu vaccines.  Chart says that pt received flu vaccine in September but he says that he didn't??  Recommend referral to orthopedics for knee pain.

## 2020-11-27 NOTE — PATIENT INSTRUCTIONS
Counseling and Referral of Other Preventative  (Italic type indicates deductible and co-insurance are waived)    Patient Name: Alejo Melvin  Today's Date: 11/27/2020    Health Maintenance       Date Due Completion Date    Shingles Vaccine (1 of 2) 04/27/1995 ---    Pneumococcal Vaccine (65+ Low/Medium Risk) (1 of 2 - PCV13) 04/27/2010 ---    Lipid Panel 09/14/2021 9/14/2020    Colorectal Cancer Screening 02/25/2024 2/25/2014    TETANUS VACCINE 04/07/2027 4/7/2017 (Declined)    Override on 4/7/2017: Declined        No orders of the defined types were placed in this encounter.    The following information is provided to all patients.  This information is to help you find resources for any of the problems found today that may be affecting your health:                Living healthy guide: www.[facility].[place].gov      Understanding Diabetes: www.diabetes.org      Eating healthy: www.cdc.gov/healthyweight      CDC home safety checklist: www.cdc.gov/steadi/patient.html      Agency on Aging: www.goea.[place].[facility]      Alcoholics anonymous (AA): www.aa.org      Physical Activity: www.ar.nih.gov/gd6gsqw      Tobacco use: www.quitwithusla.org

## 2020-11-27 NOTE — PROGRESS NOTES
"  Alejo Melvin presented for an initial Medicare AWV today. The following components were reviewed and updated:    · Medical history  · Family History  · Social history  · Allergies and Current Medications  · Health Risk Assessment  · Health Maintenance  · Care Team    **See Completed Assessments for Annual Wellness visit with in the encounter summary    The following assessments were completed:  · Depression Screening  · Cognitive function Screening  · Timed Get Up Test  · Whisper Test    Vitals:    11/25/20 0822   BP: 128/82   BP Location: Right arm   Patient Position: Sitting   BP Method: Medium (Manual)   Pulse: 78   Resp: 16   Temp: 97.9 °F (36.6 °C)   SpO2: 98%   Weight: 128.3 kg (282 lb 13.6 oz)   Height: 6' 1" (1.854 m)     Body mass index is 37.32 kg/m².   ]    Physical Exam  HENT:      Head: Normocephalic and atraumatic.      Nose: Nose normal.      Mouth/Throat:      Mouth: Mucous membranes are moist.   Eyes:      Extraocular Movements: Extraocular movements intact.      Conjunctiva/sclera: Conjunctivae normal.   Cardiovascular:      Rate and Rhythm: Normal rate.   Pulmonary:      Effort: Pulmonary effort is normal.      Breath sounds: Normal breath sounds.   Skin:     General: Skin is warm and dry.   Neurological:      Mental Status: He is alert.           Diagnoses and health risks identified today and associated recommendations/orders:     1. Encounter for preventive health examination  Due for pneumonia and flu vaccines  Discussed that he has never had chicken pox so Shingrix not needed     2. Essential hypertension  Well controlled, cont norvasc and losartan as prescribed by PCP.  BP Readings from Last 3 Encounters:   11/25/20 128/82   09/14/20 (!) 142/86   03/20/20 102/60        3. Mixed hyperlipidemia  On asa and Lipitor, followed by PCP  Lab Results   Component Value Date    CHOL 152 09/14/2020    CHOL 140 03/20/2020    CHOL 191 09/30/2019     Lab Results   Component Value Date    HDL 39 (L) " 09/14/2020    HDL 29 (L) 03/20/2020    HDL 33 (L) 09/30/2019     Lab Results   Component Value Date    LDLCALC 87.4 09/14/2020    LDLCALC 89.8 03/20/2020    LDLCALC 127.2 09/30/2019     Lab Results   Component Value Date    TRIG 128 09/14/2020    TRIG 106 03/20/2020    TRIG 154 (H) 09/30/2019     Lab Results   Component Value Date    CHOLHDL 25.7 09/14/2020    CHOLHDL 20.7 03/20/2020    CHOLHDL 17.3 (L) 09/30/2019         4. MARYLU on CPAP  Cont C pap     5. Primary osteoarthritis of left knee  OTC meds not providing good relief, consider referral to orthopedics     6. Polyneuropathy  Sees neurology     7. Pacemaker  Followed by cardiology        Provided Alejo with a 5-10 year written screening schedule and personal prevention plan. Recommendations were developed using the USPSTF age appropriate recommendations. Education, counseling, and referrals were provided as needed.  After Visit Summary printed and given to patient which includes a list of additional screenings\tests needed.    No follow-ups on file.      Concetta Salvador PA-C

## 2021-01-01 NOTE — TELEPHONE ENCOUNTER
----- Message from Giuliano Torre sent at 2018 10:15 AM CDT -----  Contact: Patient  Alejo Melvin  MRN: [id number]  : 1945  PCP: Leroy Alston  Home Phone      [phone]  Work Phone      Not on file.  Mobile          [phone]      MESSAGE: states ankle has improved a little - still looks infected -- has taken antibiotics -- please advise    Call [phone]    PCP: Alecia  
Left message for pt to call back.  
Pt seen on 4/21/18 and today  
negative - no pain, no limited range of motion

## 2021-01-05 ENCOUNTER — PATIENT MESSAGE (OUTPATIENT)
Dept: ADMINISTRATIVE | Facility: OTHER | Age: 76
End: 2021-01-05

## 2021-01-07 ENCOUNTER — IMMUNIZATION (OUTPATIENT)
Dept: OBSTETRICS AND GYNECOLOGY | Facility: CLINIC | Age: 76
End: 2021-01-07
Payer: MEDICARE

## 2021-01-07 DIAGNOSIS — Z23 NEED FOR VACCINATION: ICD-10-CM

## 2021-01-07 PROCEDURE — 91300 COVID-19, MRNA, LNP-S, PF, 30 MCG/0.3 ML DOSE VACCINE: CPT | Mod: PBBFAC | Performed by: ANESTHESIOLOGY

## 2021-01-08 ENCOUNTER — PATIENT MESSAGE (OUTPATIENT)
Dept: FAMILY MEDICINE | Facility: CLINIC | Age: 76
End: 2021-01-08

## 2021-01-29 ENCOUNTER — IMMUNIZATION (OUTPATIENT)
Dept: OBSTETRICS AND GYNECOLOGY | Facility: CLINIC | Age: 76
End: 2021-01-29
Payer: MEDICARE

## 2021-01-29 DIAGNOSIS — Z23 NEED FOR VACCINATION: Primary | ICD-10-CM

## 2021-01-29 PROCEDURE — 0002A COVID-19, MRNA, LNP-S, PF, 30 MCG/0.3 ML DOSE VACCINE: CPT | Mod: PBBFAC | Performed by: ANESTHESIOLOGY

## 2021-01-29 PROCEDURE — 91300 COVID-19, MRNA, LNP-S, PF, 30 MCG/0.3 ML DOSE VACCINE: CPT | Mod: PBBFAC | Performed by: ANESTHESIOLOGY

## 2021-03-17 ENCOUNTER — PATIENT OUTREACH (OUTPATIENT)
Dept: ADMINISTRATIVE | Facility: OTHER | Age: 76
End: 2021-03-17

## 2021-03-18 ENCOUNTER — OFFICE VISIT (OUTPATIENT)
Dept: NEUROLOGY | Facility: CLINIC | Age: 76
End: 2021-03-18
Payer: MEDICARE

## 2021-03-18 VITALS
TEMPERATURE: 97 F | DIASTOLIC BLOOD PRESSURE: 88 MMHG | HEIGHT: 73 IN | SYSTOLIC BLOOD PRESSURE: 130 MMHG | RESPIRATION RATE: 20 BRPM | OXYGEN SATURATION: 98 % | HEART RATE: 73 BPM | BODY MASS INDEX: 37.36 KG/M2 | WEIGHT: 281.88 LBS

## 2021-03-18 DIAGNOSIS — G56.03 BILATERAL CARPAL TUNNEL SYNDROME: ICD-10-CM

## 2021-03-18 DIAGNOSIS — Z95.0 PACEMAKER: ICD-10-CM

## 2021-03-18 DIAGNOSIS — E78.2 MIXED HYPERLIPIDEMIA: ICD-10-CM

## 2021-03-18 DIAGNOSIS — G47.33 OSA ON CPAP: ICD-10-CM

## 2021-03-18 DIAGNOSIS — I10 ESSENTIAL HYPERTENSION: ICD-10-CM

## 2021-03-18 DIAGNOSIS — G62.9 POLYNEUROPATHY: Primary | ICD-10-CM

## 2021-03-18 PROCEDURE — 3288F PR FALLS RISK ASSESSMENT DOCUMENTED: ICD-10-PCS | Mod: S$GLB,,, | Performed by: NURSE PRACTITIONER

## 2021-03-18 PROCEDURE — 1101F PT FALLS ASSESS-DOCD LE1/YR: CPT | Mod: S$GLB,,, | Performed by: NURSE PRACTITIONER

## 2021-03-18 PROCEDURE — 99999 PR PBB SHADOW E&M-EST. PATIENT-LVL V: ICD-10-PCS | Mod: PBBFAC,,, | Performed by: NURSE PRACTITIONER

## 2021-03-18 PROCEDURE — 1126F AMNT PAIN NOTED NONE PRSNT: CPT | Mod: S$GLB,,, | Performed by: NURSE PRACTITIONER

## 2021-03-18 PROCEDURE — 99214 PR OFFICE/OUTPT VISIT, EST, LEVL IV, 30-39 MIN: ICD-10-PCS | Mod: S$GLB,,, | Performed by: NURSE PRACTITIONER

## 2021-03-18 PROCEDURE — 3075F PR MOST RECENT SYSTOLIC BLOOD PRESS GE 130-139MM HG: ICD-10-PCS | Mod: S$GLB,,, | Performed by: NURSE PRACTITIONER

## 2021-03-18 PROCEDURE — 1159F MED LIST DOCD IN RCRD: CPT | Mod: S$GLB,,, | Performed by: NURSE PRACTITIONER

## 2021-03-18 PROCEDURE — 1159F PR MEDICATION LIST DOCUMENTED IN MEDICAL RECORD: ICD-10-PCS | Mod: S$GLB,,, | Performed by: NURSE PRACTITIONER

## 2021-03-18 PROCEDURE — 3079F PR MOST RECENT DIASTOLIC BLOOD PRESSURE 80-89 MM HG: ICD-10-PCS | Mod: S$GLB,,, | Performed by: NURSE PRACTITIONER

## 2021-03-18 PROCEDURE — 1101F PR PT FALLS ASSESS DOC 0-1 FALLS W/OUT INJ PAST YR: ICD-10-PCS | Mod: S$GLB,,, | Performed by: NURSE PRACTITIONER

## 2021-03-18 PROCEDURE — 3075F SYST BP GE 130 - 139MM HG: CPT | Mod: S$GLB,,, | Performed by: NURSE PRACTITIONER

## 2021-03-18 PROCEDURE — 1126F PR PAIN SEVERITY QUANTIFIED, NO PAIN PRESENT: ICD-10-PCS | Mod: S$GLB,,, | Performed by: NURSE PRACTITIONER

## 2021-03-18 PROCEDURE — 3288F FALL RISK ASSESSMENT DOCD: CPT | Mod: S$GLB,,, | Performed by: NURSE PRACTITIONER

## 2021-03-18 PROCEDURE — 3079F DIAST BP 80-89 MM HG: CPT | Mod: S$GLB,,, | Performed by: NURSE PRACTITIONER

## 2021-03-18 PROCEDURE — 99214 OFFICE O/P EST MOD 30 MIN: CPT | Mod: S$GLB,,, | Performed by: NURSE PRACTITIONER

## 2021-03-18 PROCEDURE — 99999 PR PBB SHADOW E&M-EST. PATIENT-LVL V: CPT | Mod: PBBFAC,,, | Performed by: NURSE PRACTITIONER

## 2021-03-19 ENCOUNTER — OFFICE VISIT (OUTPATIENT)
Dept: INTERNAL MEDICINE | Facility: CLINIC | Age: 76
End: 2021-03-19
Payer: MEDICARE

## 2021-03-19 VITALS
DIASTOLIC BLOOD PRESSURE: 72 MMHG | OXYGEN SATURATION: 98 % | HEART RATE: 83 BPM | HEIGHT: 73 IN | RESPIRATION RATE: 16 BRPM | WEIGHT: 280.88 LBS | TEMPERATURE: 97 F | SYSTOLIC BLOOD PRESSURE: 110 MMHG | BODY MASS INDEX: 37.22 KG/M2

## 2021-03-19 DIAGNOSIS — J33.9 NASAL POLYP: ICD-10-CM

## 2021-03-19 DIAGNOSIS — R73.02 IMPAIRED GLUCOSE TOLERANCE (ORAL): ICD-10-CM

## 2021-03-19 DIAGNOSIS — G47.33 OSA ON CPAP: ICD-10-CM

## 2021-03-19 DIAGNOSIS — E78.2 MIXED HYPERLIPIDEMIA: Primary | ICD-10-CM

## 2021-03-19 DIAGNOSIS — G62.9 POLYNEUROPATHY: ICD-10-CM

## 2021-03-19 DIAGNOSIS — I10 ESSENTIAL HYPERTENSION: ICD-10-CM

## 2021-03-19 DIAGNOSIS — M17.12 PRIMARY OSTEOARTHRITIS OF LEFT KNEE: ICD-10-CM

## 2021-03-19 PROCEDURE — 3288F FALL RISK ASSESSMENT DOCD: CPT | Mod: S$GLB,,, | Performed by: FAMILY MEDICINE

## 2021-03-19 PROCEDURE — 1101F PR PT FALLS ASSESS DOC 0-1 FALLS W/OUT INJ PAST YR: ICD-10-PCS | Mod: S$GLB,,, | Performed by: FAMILY MEDICINE

## 2021-03-19 PROCEDURE — 1159F PR MEDICATION LIST DOCUMENTED IN MEDICAL RECORD: ICD-10-PCS | Mod: S$GLB,,, | Performed by: FAMILY MEDICINE

## 2021-03-19 PROCEDURE — 3078F PR MOST RECENT DIASTOLIC BLOOD PRESSURE < 80 MM HG: ICD-10-PCS | Mod: S$GLB,,, | Performed by: FAMILY MEDICINE

## 2021-03-19 PROCEDURE — 3074F PR MOST RECENT SYSTOLIC BLOOD PRESSURE < 130 MM HG: ICD-10-PCS | Mod: S$GLB,,, | Performed by: FAMILY MEDICINE

## 2021-03-19 PROCEDURE — 3288F PR FALLS RISK ASSESSMENT DOCUMENTED: ICD-10-PCS | Mod: S$GLB,,, | Performed by: FAMILY MEDICINE

## 2021-03-19 PROCEDURE — 3078F DIAST BP <80 MM HG: CPT | Mod: S$GLB,,, | Performed by: FAMILY MEDICINE

## 2021-03-19 PROCEDURE — 99999 PR PBB SHADOW E&M-EST. PATIENT-LVL IV: CPT | Mod: PBBFAC,,, | Performed by: FAMILY MEDICINE

## 2021-03-19 PROCEDURE — 1125F PR PAIN SEVERITY QUANTIFIED, PAIN PRESENT: ICD-10-PCS | Mod: S$GLB,,, | Performed by: FAMILY MEDICINE

## 2021-03-19 PROCEDURE — 99214 PR OFFICE/OUTPT VISIT, EST, LEVL IV, 30-39 MIN: ICD-10-PCS | Mod: S$GLB,,, | Performed by: FAMILY MEDICINE

## 2021-03-19 PROCEDURE — 99999 PR PBB SHADOW E&M-EST. PATIENT-LVL IV: ICD-10-PCS | Mod: PBBFAC,,, | Performed by: FAMILY MEDICINE

## 2021-03-19 PROCEDURE — 1159F MED LIST DOCD IN RCRD: CPT | Mod: S$GLB,,, | Performed by: FAMILY MEDICINE

## 2021-03-19 PROCEDURE — 1101F PT FALLS ASSESS-DOCD LE1/YR: CPT | Mod: S$GLB,,, | Performed by: FAMILY MEDICINE

## 2021-03-19 PROCEDURE — 3074F SYST BP LT 130 MM HG: CPT | Mod: S$GLB,,, | Performed by: FAMILY MEDICINE

## 2021-03-19 PROCEDURE — 99214 OFFICE O/P EST MOD 30 MIN: CPT | Mod: S$GLB,,, | Performed by: FAMILY MEDICINE

## 2021-03-19 PROCEDURE — 1125F AMNT PAIN NOTED PAIN PRSNT: CPT | Mod: S$GLB,,, | Performed by: FAMILY MEDICINE

## 2021-03-19 RX ORDER — FUROSEMIDE 40 MG/1
80 TABLET ORAL DAILY
Qty: 60 TABLET | Refills: 5 | Status: SHIPPED | OUTPATIENT
Start: 2021-03-19 | End: 2022-02-07

## 2021-03-19 RX ORDER — ATORVASTATIN CALCIUM 40 MG/1
40 TABLET, FILM COATED ORAL NIGHTLY
Qty: 30 TABLET | Refills: 5 | Status: SHIPPED | OUTPATIENT
Start: 2021-03-19 | End: 2021-06-17

## 2021-03-19 RX ORDER — LOSARTAN POTASSIUM 50 MG/1
50 TABLET ORAL DAILY
Qty: 30 TABLET | Refills: 5 | Status: SHIPPED | OUTPATIENT
Start: 2021-03-19 | End: 2022-01-11

## 2021-03-19 RX ORDER — AMLODIPINE BESYLATE 5 MG/1
5 TABLET ORAL DAILY
Qty: 30 TABLET | Refills: 5 | Status: SHIPPED | OUTPATIENT
Start: 2021-03-19 | End: 2022-04-11 | Stop reason: SDUPTHER

## 2021-03-19 RX ORDER — FLUTICASONE PROPIONATE 50 MCG
2 SPRAY, SUSPENSION (ML) NASAL DAILY PRN
Qty: 16 G | Refills: 5 | Status: SHIPPED | OUTPATIENT
Start: 2021-03-19

## 2021-03-23 ENCOUNTER — PATIENT MESSAGE (OUTPATIENT)
Dept: INTERNAL MEDICINE | Facility: CLINIC | Age: 76
End: 2021-03-23

## 2021-03-30 ENCOUNTER — PATIENT MESSAGE (OUTPATIENT)
Dept: FAMILY MEDICINE | Facility: CLINIC | Age: 76
End: 2021-03-30

## 2021-03-30 ENCOUNTER — LAB VISIT (OUTPATIENT)
Dept: LAB | Facility: HOSPITAL | Age: 76
End: 2021-03-30
Attending: FAMILY MEDICINE
Payer: MEDICARE

## 2021-03-30 ENCOUNTER — TELEPHONE (OUTPATIENT)
Dept: FAMILY MEDICINE | Facility: CLINIC | Age: 76
End: 2021-03-30

## 2021-03-30 DIAGNOSIS — I10 ESSENTIAL HYPERTENSION: Primary | ICD-10-CM

## 2021-03-30 DIAGNOSIS — G62.9 POLYNEUROPATHY: ICD-10-CM

## 2021-03-30 DIAGNOSIS — I10 ESSENTIAL HYPERTENSION: ICD-10-CM

## 2021-03-30 DIAGNOSIS — R73.02 IMPAIRED GLUCOSE TOLERANCE (ORAL): ICD-10-CM

## 2021-03-30 DIAGNOSIS — E78.2 MIXED HYPERLIPIDEMIA: ICD-10-CM

## 2021-03-30 LAB
ALBUMIN SERPL BCP-MCNC: 3.8 G/DL (ref 3.5–5.2)
ALP SERPL-CCNC: 72 U/L (ref 55–135)
ALT SERPL W/O P-5'-P-CCNC: 38 U/L (ref 10–44)
ANION GAP SERPL CALC-SCNC: 10 MMOL/L (ref 8–16)
AST SERPL-CCNC: 24 U/L (ref 10–40)
BILIRUB SERPL-MCNC: 0.7 MG/DL (ref 0.1–1)
BUN SERPL-MCNC: 17 MG/DL (ref 8–23)
CALCIUM SERPL-MCNC: 9.5 MG/DL (ref 8.7–10.5)
CHLORIDE SERPL-SCNC: 103 MMOL/L (ref 95–110)
CHOLEST SERPL-MCNC: 130 MG/DL (ref 120–199)
CHOLEST/HDLC SERPL: 3.8 {RATIO} (ref 2–5)
CO2 SERPL-SCNC: 28 MMOL/L (ref 23–29)
CREAT SERPL-MCNC: 1 MG/DL (ref 0.5–1.4)
EST. GFR  (AFRICAN AMERICAN): >60 ML/MIN/1.73 M^2
EST. GFR  (NON AFRICAN AMERICAN): >60 ML/MIN/1.73 M^2
ESTIMATED AVG GLUCOSE: 105 MG/DL (ref 68–131)
GLUCOSE SERPL-MCNC: 96 MG/DL (ref 70–110)
HBA1C MFR BLD: 5.3 % (ref 4–5.6)
HDLC SERPL-MCNC: 34 MG/DL (ref 40–75)
HDLC SERPL: 26.2 % (ref 20–50)
LDLC SERPL CALC-MCNC: 75.8 MG/DL (ref 63–159)
NONHDLC SERPL-MCNC: 96 MG/DL
POTASSIUM SERPL-SCNC: 3.4 MMOL/L (ref 3.5–5.1)
PROT SERPL-MCNC: 6.9 G/DL (ref 6–8.4)
SODIUM SERPL-SCNC: 141 MMOL/L (ref 136–145)
TRIGL SERPL-MCNC: 101 MG/DL (ref 30–150)

## 2021-03-30 PROCEDURE — 36415 COLL VENOUS BLD VENIPUNCTURE: CPT | Performed by: FAMILY MEDICINE

## 2021-03-30 PROCEDURE — 83036 HEMOGLOBIN GLYCOSYLATED A1C: CPT | Performed by: FAMILY MEDICINE

## 2021-03-30 PROCEDURE — 80061 LIPID PANEL: CPT | Performed by: FAMILY MEDICINE

## 2021-03-30 PROCEDURE — 80053 COMPREHEN METABOLIC PANEL: CPT | Performed by: FAMILY MEDICINE

## 2021-03-30 RX ORDER — POTASSIUM CHLORIDE 20 MEQ/1
20 TABLET, EXTENDED RELEASE ORAL ONCE
Qty: 30 TABLET | Refills: 5 | Status: SHIPPED | OUTPATIENT
Start: 2021-03-30 | End: 2021-04-01 | Stop reason: SDUPTHER

## 2021-04-01 RX ORDER — POTASSIUM CHLORIDE 20 MEQ/1
20 TABLET, EXTENDED RELEASE ORAL DAILY
Qty: 30 TABLET | Refills: 5 | Status: SHIPPED | OUTPATIENT
Start: 2021-04-01 | End: 2021-10-18 | Stop reason: SDUPTHER

## 2021-06-03 ENCOUNTER — PATIENT MESSAGE (OUTPATIENT)
Dept: INTERNAL MEDICINE | Facility: CLINIC | Age: 76
End: 2021-06-03

## 2021-06-16 ENCOUNTER — HOSPITAL ENCOUNTER (OUTPATIENT)
Dept: RADIOLOGY | Facility: HOSPITAL | Age: 76
Discharge: HOME | End: 2021-06-16
Attending: FAMILY MEDICINE
Payer: MEDICARE

## 2021-06-16 ENCOUNTER — OFFICE VISIT (OUTPATIENT)
Dept: INTERNAL MEDICINE | Facility: CLINIC | Age: 76
End: 2021-06-16
Payer: MEDICARE

## 2021-06-16 VITALS
BODY MASS INDEX: 38.43 KG/M2 | OXYGEN SATURATION: 98 % | DIASTOLIC BLOOD PRESSURE: 80 MMHG | HEART RATE: 80 BPM | WEIGHT: 283.75 LBS | RESPIRATION RATE: 18 BRPM | SYSTOLIC BLOOD PRESSURE: 138 MMHG | HEIGHT: 72 IN

## 2021-06-16 DIAGNOSIS — M17.32 POST-TRAUMATIC OSTEOARTHRITIS OF LEFT KNEE: ICD-10-CM

## 2021-06-16 DIAGNOSIS — M17.32 POST-TRAUMATIC OSTEOARTHRITIS OF LEFT KNEE: Primary | ICD-10-CM

## 2021-06-16 PROCEDURE — 1125F AMNT PAIN NOTED PAIN PRSNT: CPT | Mod: S$GLB,,, | Performed by: FAMILY MEDICINE

## 2021-06-16 PROCEDURE — 73560 XR KNEE AP STANDING WITH BOTH LATERAL: ICD-10-PCS | Mod: 26,50,, | Performed by: RADIOLOGY

## 2021-06-16 PROCEDURE — 1159F MED LIST DOCD IN RCRD: CPT | Mod: S$GLB,,, | Performed by: FAMILY MEDICINE

## 2021-06-16 PROCEDURE — 99999 PR PBB SHADOW E&M-EST. PATIENT-LVL V: CPT | Mod: PBBFAC,,, | Performed by: FAMILY MEDICINE

## 2021-06-16 PROCEDURE — 20610 PR DRAIN/INJECT LARGE JOINT/BURSA: ICD-10-PCS | Mod: LT,S$GLB,, | Performed by: FAMILY MEDICINE

## 2021-06-16 PROCEDURE — 99999 PR PBB SHADOW E&M-EST. PATIENT-LVL V: ICD-10-PCS | Mod: PBBFAC,,, | Performed by: FAMILY MEDICINE

## 2021-06-16 PROCEDURE — 99213 OFFICE O/P EST LOW 20 MIN: CPT | Mod: 25,S$GLB,, | Performed by: FAMILY MEDICINE

## 2021-06-16 PROCEDURE — 20610 DRAIN/INJ JOINT/BURSA W/O US: CPT | Mod: LT,S$GLB,, | Performed by: FAMILY MEDICINE

## 2021-06-16 PROCEDURE — 99213 PR OFFICE/OUTPT VISIT, EST, LEVL III, 20-29 MIN: ICD-10-PCS | Mod: 25,S$GLB,, | Performed by: FAMILY MEDICINE

## 2021-06-16 PROCEDURE — 73560 X-RAY EXAM OF KNEE 1 OR 2: CPT | Mod: 26,50,, | Performed by: RADIOLOGY

## 2021-06-16 PROCEDURE — 1159F PR MEDICATION LIST DOCUMENTED IN MEDICAL RECORD: ICD-10-PCS | Mod: S$GLB,,, | Performed by: FAMILY MEDICINE

## 2021-06-16 PROCEDURE — 1125F PR PAIN SEVERITY QUANTIFIED, PAIN PRESENT: ICD-10-PCS | Mod: S$GLB,,, | Performed by: FAMILY MEDICINE

## 2021-06-16 PROCEDURE — 73560 X-RAY EXAM OF KNEE 1 OR 2: CPT | Mod: TC,50

## 2021-06-16 RX ORDER — TRIAMCINOLONE ACETONIDE 40 MG/ML
40 INJECTION, SUSPENSION INTRA-ARTICULAR; INTRAMUSCULAR
Status: COMPLETED | OUTPATIENT
Start: 2021-06-16 | End: 2021-06-16

## 2021-06-16 RX ORDER — BUPIVACAINE HYDROCHLORIDE 5 MG/ML
2 INJECTION, SOLUTION EPIDURAL; INTRACAUDAL
Status: COMPLETED | OUTPATIENT
Start: 2021-06-16 | End: 2021-06-16

## 2021-06-16 RX ORDER — ATORVASTATIN CALCIUM 40 MG/1
40 TABLET, FILM COATED ORAL DAILY
COMMUNITY
End: 2021-10-01

## 2021-06-16 RX ORDER — ROSUVASTATIN CALCIUM 40 MG/1
40 TABLET, COATED ORAL DAILY
COMMUNITY
Start: 2021-06-09 | End: 2024-01-03

## 2021-06-16 RX ORDER — TRIAMCINOLONE ACETONIDE 40 MG/ML
40 INJECTION, SUSPENSION INTRA-ARTICULAR; INTRAMUSCULAR
Status: DISCONTINUED | OUTPATIENT
Start: 2021-06-16 | End: 2021-06-16

## 2021-06-16 RX ORDER — BUPIVACAINE HYDROCHLORIDE 5 MG/ML
2 INJECTION, SOLUTION EPIDURAL; INTRACAUDAL
Status: DISCONTINUED | OUTPATIENT
Start: 2021-06-16 | End: 2021-06-16

## 2021-06-16 RX ADMIN — BUPIVACAINE HYDROCHLORIDE 10 MG: 5 INJECTION, SOLUTION EPIDURAL; INTRACAUDAL at 02:06

## 2021-06-16 RX ADMIN — TRIAMCINOLONE ACETONIDE 40 MG: 40 INJECTION, SUSPENSION INTRA-ARTICULAR; INTRAMUSCULAR at 02:06

## 2021-06-18 ENCOUNTER — OFFICE VISIT (OUTPATIENT)
Dept: ORTHOPEDICS | Facility: CLINIC | Age: 76
End: 2021-06-18
Payer: MEDICARE

## 2021-06-18 VITALS
WEIGHT: 285.69 LBS | BODY MASS INDEX: 38.7 KG/M2 | DIASTOLIC BLOOD PRESSURE: 78 MMHG | SYSTOLIC BLOOD PRESSURE: 136 MMHG | OXYGEN SATURATION: 99 % | HEIGHT: 72 IN | HEART RATE: 73 BPM

## 2021-06-18 DIAGNOSIS — M17.0 PRIMARY OSTEOARTHRITIS OF BOTH KNEES: Primary | ICD-10-CM

## 2021-06-18 PROCEDURE — 99213 OFFICE O/P EST LOW 20 MIN: CPT | Mod: S$GLB,,, | Performed by: PHYSICIAN ASSISTANT

## 2021-06-18 PROCEDURE — 99213 PR OFFICE/OUTPT VISIT, EST, LEVL III, 20-29 MIN: ICD-10-PCS | Mod: S$GLB,,, | Performed by: PHYSICIAN ASSISTANT

## 2021-06-18 PROCEDURE — 1159F PR MEDICATION LIST DOCUMENTED IN MEDICAL RECORD: ICD-10-PCS | Mod: S$GLB,,, | Performed by: PHYSICIAN ASSISTANT

## 2021-06-18 PROCEDURE — 99999 PR PBB SHADOW E&M-EST. PATIENT-LVL V: ICD-10-PCS | Mod: PBBFAC,,, | Performed by: PHYSICIAN ASSISTANT

## 2021-06-18 PROCEDURE — 1125F AMNT PAIN NOTED PAIN PRSNT: CPT | Mod: S$GLB,,, | Performed by: PHYSICIAN ASSISTANT

## 2021-06-18 PROCEDURE — 1101F PR PT FALLS ASSESS DOC 0-1 FALLS W/OUT INJ PAST YR: ICD-10-PCS | Mod: S$GLB,,, | Performed by: PHYSICIAN ASSISTANT

## 2021-06-18 PROCEDURE — 99999 PR PBB SHADOW E&M-EST. PATIENT-LVL V: CPT | Mod: PBBFAC,,, | Performed by: PHYSICIAN ASSISTANT

## 2021-06-18 PROCEDURE — 1101F PT FALLS ASSESS-DOCD LE1/YR: CPT | Mod: S$GLB,,, | Performed by: PHYSICIAN ASSISTANT

## 2021-06-18 PROCEDURE — 3288F PR FALLS RISK ASSESSMENT DOCUMENTED: ICD-10-PCS | Mod: S$GLB,,, | Performed by: PHYSICIAN ASSISTANT

## 2021-06-18 PROCEDURE — 3288F FALL RISK ASSESSMENT DOCD: CPT | Mod: S$GLB,,, | Performed by: PHYSICIAN ASSISTANT

## 2021-06-18 PROCEDURE — 1159F MED LIST DOCD IN RCRD: CPT | Mod: S$GLB,,, | Performed by: PHYSICIAN ASSISTANT

## 2021-06-18 PROCEDURE — 1125F PR PAIN SEVERITY QUANTIFIED, PAIN PRESENT: ICD-10-PCS | Mod: S$GLB,,, | Performed by: PHYSICIAN ASSISTANT

## 2021-06-24 ENCOUNTER — TELEPHONE (OUTPATIENT)
Dept: ORTHOPEDICS | Facility: CLINIC | Age: 76
End: 2021-06-24

## 2021-07-01 ENCOUNTER — TELEPHONE (OUTPATIENT)
Dept: ORTHOPEDICS | Facility: CLINIC | Age: 76
End: 2021-07-01

## 2021-08-03 DIAGNOSIS — M17.0 PRIMARY OSTEOARTHRITIS OF BOTH KNEES: Primary | ICD-10-CM

## 2021-08-04 ENCOUNTER — OFFICE VISIT (OUTPATIENT)
Dept: ORTHOPEDICS | Facility: CLINIC | Age: 76
End: 2021-08-04
Payer: MEDICARE

## 2021-08-04 ENCOUNTER — PATIENT OUTREACH (OUTPATIENT)
Dept: ADMINISTRATIVE | Facility: OTHER | Age: 76
End: 2021-08-04

## 2021-08-04 ENCOUNTER — HOSPITAL ENCOUNTER (OUTPATIENT)
Dept: RADIOLOGY | Facility: HOSPITAL | Age: 76
Discharge: HOME | End: 2021-08-04
Attending: ORTHOPAEDIC SURGERY
Payer: MEDICARE

## 2021-08-04 VITALS — BODY MASS INDEX: 39.04 KG/M2 | HEIGHT: 72 IN | WEIGHT: 288.25 LBS

## 2021-08-04 DIAGNOSIS — M17.0 PRIMARY OSTEOARTHRITIS OF BOTH KNEES: ICD-10-CM

## 2021-08-04 DIAGNOSIS — Z01.818 PRE-OP TESTING: ICD-10-CM

## 2021-08-04 PROCEDURE — 73562 X-RAY EXAM OF KNEE 3: CPT | Mod: 26,50,, | Performed by: RADIOLOGY

## 2021-08-04 PROCEDURE — 1125F PR PAIN SEVERITY QUANTIFIED, PAIN PRESENT: ICD-10-PCS | Mod: S$GLB,,, | Performed by: ORTHOPAEDIC SURGERY

## 2021-08-04 PROCEDURE — 99999 PR PBB SHADOW E&M-EST. PATIENT-LVL III: ICD-10-PCS | Mod: PBBFAC,,, | Performed by: ORTHOPAEDIC SURGERY

## 2021-08-04 PROCEDURE — 1159F MED LIST DOCD IN RCRD: CPT | Mod: S$GLB,,, | Performed by: ORTHOPAEDIC SURGERY

## 2021-08-04 PROCEDURE — 99214 PR OFFICE/OUTPT VISIT, EST, LEVL IV, 30-39 MIN: ICD-10-PCS | Mod: S$GLB,,, | Performed by: ORTHOPAEDIC SURGERY

## 2021-08-04 PROCEDURE — 99999 PR PBB SHADOW E&M-EST. PATIENT-LVL III: CPT | Mod: PBBFAC,,, | Performed by: ORTHOPAEDIC SURGERY

## 2021-08-04 PROCEDURE — 1160F PR REVIEW ALL MEDS BY PRESCRIBER/CLIN PHARMACIST DOCUMENTED: ICD-10-PCS | Mod: S$GLB,,, | Performed by: ORTHOPAEDIC SURGERY

## 2021-08-04 PROCEDURE — 73562 XR KNEE ORTHO BILAT: ICD-10-PCS | Mod: 26,50,, | Performed by: RADIOLOGY

## 2021-08-04 PROCEDURE — 73562 X-RAY EXAM OF KNEE 3: CPT | Mod: TC,50

## 2021-08-04 PROCEDURE — 1159F PR MEDICATION LIST DOCUMENTED IN MEDICAL RECORD: ICD-10-PCS | Mod: S$GLB,,, | Performed by: ORTHOPAEDIC SURGERY

## 2021-08-04 PROCEDURE — 99214 OFFICE O/P EST MOD 30 MIN: CPT | Mod: S$GLB,,, | Performed by: ORTHOPAEDIC SURGERY

## 2021-08-04 PROCEDURE — 1125F AMNT PAIN NOTED PAIN PRSNT: CPT | Mod: S$GLB,,, | Performed by: ORTHOPAEDIC SURGERY

## 2021-08-04 PROCEDURE — 1160F RVW MEDS BY RX/DR IN RCRD: CPT | Mod: S$GLB,,, | Performed by: ORTHOPAEDIC SURGERY

## 2021-08-05 DIAGNOSIS — M17.12 PRIMARY OSTEOARTHRITIS OF LEFT KNEE: Primary | ICD-10-CM

## 2021-08-06 ENCOUNTER — PATIENT MESSAGE (OUTPATIENT)
Dept: FAMILY MEDICINE | Facility: CLINIC | Age: 76
End: 2021-08-06

## 2021-08-06 ENCOUNTER — PATIENT MESSAGE (OUTPATIENT)
Dept: ADMINISTRATIVE | Facility: OTHER | Age: 76
End: 2021-08-06

## 2021-08-16 ENCOUNTER — OFFICE VISIT (OUTPATIENT)
Dept: ORTHOPEDICS | Facility: CLINIC | Age: 76
End: 2021-08-16
Payer: MEDICARE

## 2021-08-16 VITALS — WEIGHT: 284.38 LBS | HEIGHT: 72 IN | BODY MASS INDEX: 38.52 KG/M2

## 2021-08-16 DIAGNOSIS — M17.11 PRIMARY OSTEOARTHRITIS OF RIGHT KNEE: Primary | ICD-10-CM

## 2021-08-16 PROCEDURE — 1125F PR PAIN SEVERITY QUANTIFIED, PAIN PRESENT: ICD-10-PCS | Mod: S$GLB,,, | Performed by: PHYSICIAN ASSISTANT

## 2021-08-16 PROCEDURE — 1125F AMNT PAIN NOTED PAIN PRSNT: CPT | Mod: S$GLB,,, | Performed by: PHYSICIAN ASSISTANT

## 2021-08-16 PROCEDURE — 1159F MED LIST DOCD IN RCRD: CPT | Mod: S$GLB,,, | Performed by: PHYSICIAN ASSISTANT

## 2021-08-16 PROCEDURE — 99499 NO LOS: ICD-10-PCS | Mod: S$GLB,,, | Performed by: PHYSICIAN ASSISTANT

## 2021-08-16 PROCEDURE — 3288F FALL RISK ASSESSMENT DOCD: CPT | Mod: S$GLB,,, | Performed by: PHYSICIAN ASSISTANT

## 2021-08-16 PROCEDURE — 3288F PR FALLS RISK ASSESSMENT DOCUMENTED: ICD-10-PCS | Mod: S$GLB,,, | Performed by: PHYSICIAN ASSISTANT

## 2021-08-16 PROCEDURE — 1101F PR PT FALLS ASSESS DOC 0-1 FALLS W/OUT INJ PAST YR: ICD-10-PCS | Mod: S$GLB,,, | Performed by: PHYSICIAN ASSISTANT

## 2021-08-16 PROCEDURE — 1159F PR MEDICATION LIST DOCUMENTED IN MEDICAL RECORD: ICD-10-PCS | Mod: S$GLB,,, | Performed by: PHYSICIAN ASSISTANT

## 2021-08-16 PROCEDURE — 1160F PR REVIEW ALL MEDS BY PRESCRIBER/CLIN PHARMACIST DOCUMENTED: ICD-10-PCS | Mod: S$GLB,,, | Performed by: PHYSICIAN ASSISTANT

## 2021-08-16 PROCEDURE — 99999 PR PBB SHADOW E&M-EST. PATIENT-LVL III: CPT | Mod: PBBFAC,,, | Performed by: PHYSICIAN ASSISTANT

## 2021-08-16 PROCEDURE — 1160F RVW MEDS BY RX/DR IN RCRD: CPT | Mod: S$GLB,,, | Performed by: PHYSICIAN ASSISTANT

## 2021-08-16 PROCEDURE — 20610 DRAIN/INJ JOINT/BURSA W/O US: CPT | Mod: RT,S$GLB,, | Performed by: PHYSICIAN ASSISTANT

## 2021-08-16 PROCEDURE — 20610 PR DRAIN/INJECT LARGE JOINT/BURSA: ICD-10-PCS | Mod: RT,S$GLB,, | Performed by: PHYSICIAN ASSISTANT

## 2021-08-16 PROCEDURE — 99499 UNLISTED E&M SERVICE: CPT | Mod: S$GLB,,, | Performed by: PHYSICIAN ASSISTANT

## 2021-08-16 PROCEDURE — 99999 PR PBB SHADOW E&M-EST. PATIENT-LVL III: ICD-10-PCS | Mod: PBBFAC,,, | Performed by: PHYSICIAN ASSISTANT

## 2021-08-16 PROCEDURE — 1101F PT FALLS ASSESS-DOCD LE1/YR: CPT | Mod: S$GLB,,, | Performed by: PHYSICIAN ASSISTANT

## 2021-08-19 ENCOUNTER — TELEPHONE (OUTPATIENT)
Dept: ORTHOPEDICS | Facility: CLINIC | Age: 76
End: 2021-08-19

## 2021-08-22 DIAGNOSIS — I10 HYPERTENSION, UNSPECIFIED TYPE: Primary | ICD-10-CM

## 2021-08-22 DIAGNOSIS — M79.605 PAIN OF LEFT LOWER EXTREMITY: ICD-10-CM

## 2021-08-23 ENCOUNTER — TELEPHONE (OUTPATIENT)
Dept: PREADMISSION TESTING | Facility: HOSPITAL | Age: 76
End: 2021-08-23

## 2021-08-23 ENCOUNTER — OFFICE VISIT (OUTPATIENT)
Dept: ORTHOPEDICS | Facility: CLINIC | Age: 76
End: 2021-08-23
Payer: MEDICARE

## 2021-08-23 ENCOUNTER — PATIENT MESSAGE (OUTPATIENT)
Dept: ORTHOPEDICS | Facility: CLINIC | Age: 76
End: 2021-08-23

## 2021-08-23 VITALS — WEIGHT: 284.38 LBS | HEIGHT: 72 IN | BODY MASS INDEX: 38.52 KG/M2

## 2021-08-23 DIAGNOSIS — M17.11 PRIMARY OSTEOARTHRITIS OF RIGHT KNEE: Primary | ICD-10-CM

## 2021-08-23 PROCEDURE — 1159F PR MEDICATION LIST DOCUMENTED IN MEDICAL RECORD: ICD-10-PCS | Mod: S$GLB,,, | Performed by: PHYSICIAN ASSISTANT

## 2021-08-23 PROCEDURE — 20610 DRAIN/INJ JOINT/BURSA W/O US: CPT | Mod: RT,S$GLB,, | Performed by: PHYSICIAN ASSISTANT

## 2021-08-23 PROCEDURE — 99999 PR PBB SHADOW E&M-EST. PATIENT-LVL III: CPT | Mod: PBBFAC,,, | Performed by: PHYSICIAN ASSISTANT

## 2021-08-23 PROCEDURE — 1160F RVW MEDS BY RX/DR IN RCRD: CPT | Mod: S$GLB,,, | Performed by: PHYSICIAN ASSISTANT

## 2021-08-23 PROCEDURE — 20610 PR DRAIN/INJECT LARGE JOINT/BURSA: ICD-10-PCS | Mod: RT,S$GLB,, | Performed by: PHYSICIAN ASSISTANT

## 2021-08-23 PROCEDURE — 1160F PR REVIEW ALL MEDS BY PRESCRIBER/CLIN PHARMACIST DOCUMENTED: ICD-10-PCS | Mod: S$GLB,,, | Performed by: PHYSICIAN ASSISTANT

## 2021-08-23 PROCEDURE — 1159F MED LIST DOCD IN RCRD: CPT | Mod: S$GLB,,, | Performed by: PHYSICIAN ASSISTANT

## 2021-08-23 PROCEDURE — 99499 UNLISTED E&M SERVICE: CPT | Mod: S$GLB,,, | Performed by: PHYSICIAN ASSISTANT

## 2021-08-23 PROCEDURE — 99999 PR PBB SHADOW E&M-EST. PATIENT-LVL III: ICD-10-PCS | Mod: PBBFAC,,, | Performed by: PHYSICIAN ASSISTANT

## 2021-08-23 PROCEDURE — 99499 NO LOS: ICD-10-PCS | Mod: S$GLB,,, | Performed by: PHYSICIAN ASSISTANT

## 2021-08-25 ENCOUNTER — PATIENT MESSAGE (OUTPATIENT)
Dept: ORTHOPEDICS | Facility: CLINIC | Age: 76
End: 2021-08-25

## 2021-08-26 ENCOUNTER — TELEPHONE (OUTPATIENT)
Dept: PREADMISSION TESTING | Facility: HOSPITAL | Age: 76
End: 2021-08-26

## 2021-08-30 ENCOUNTER — PATIENT MESSAGE (OUTPATIENT)
Dept: ORTHOPEDICS | Facility: CLINIC | Age: 76
End: 2021-08-30

## 2021-09-03 ENCOUNTER — PATIENT MESSAGE (OUTPATIENT)
Dept: FAMILY MEDICINE | Facility: CLINIC | Age: 76
End: 2021-09-03

## 2021-09-07 ENCOUNTER — PATIENT MESSAGE (OUTPATIENT)
Dept: SURGERY | Facility: HOSPITAL | Age: 76
End: 2021-09-07

## 2021-09-11 ENCOUNTER — TELEPHONE (OUTPATIENT)
Dept: PREADMISSION TESTING | Facility: HOSPITAL | Age: 76
End: 2021-09-11
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
## 2021-09-14 ENCOUNTER — PATIENT MESSAGE (OUTPATIENT)
Dept: ORTHOPEDICS | Facility: CLINIC | Age: 76
End: 2021-09-14

## 2021-09-20 ENCOUNTER — PATIENT MESSAGE (OUTPATIENT)
Dept: ORTHOPEDICS | Facility: CLINIC | Age: 76
End: 2021-09-20

## 2021-09-22 DIAGNOSIS — Z01.818 PRE-OP TESTING: Primary | ICD-10-CM

## 2021-09-24 ENCOUNTER — PATIENT MESSAGE (OUTPATIENT)
Dept: ORTHOPEDICS | Facility: CLINIC | Age: 76
End: 2021-09-24

## 2021-10-01 ENCOUNTER — OFFICE VISIT (OUTPATIENT)
Dept: FAMILY MEDICINE | Facility: CLINIC | Age: 76
End: 2021-10-01
Payer: MEDICARE

## 2021-10-01 ENCOUNTER — OFFICE VISIT (OUTPATIENT)
Dept: ORTHOPEDICS | Facility: CLINIC | Age: 76
End: 2021-10-01
Payer: MEDICARE

## 2021-10-01 VITALS — TEMPERATURE: 97 F | HEART RATE: 85 BPM | DIASTOLIC BLOOD PRESSURE: 82 MMHG | SYSTOLIC BLOOD PRESSURE: 144 MMHG

## 2021-10-01 VITALS
RESPIRATION RATE: 18 BRPM | WEIGHT: 288.5 LBS | HEIGHT: 72 IN | DIASTOLIC BLOOD PRESSURE: 62 MMHG | HEART RATE: 72 BPM | SYSTOLIC BLOOD PRESSURE: 122 MMHG | BODY MASS INDEX: 39.08 KG/M2

## 2021-10-01 DIAGNOSIS — L98.9 UNKNOWN SKIN LESION: Primary | ICD-10-CM

## 2021-10-01 DIAGNOSIS — M17.11 PRIMARY OSTEOARTHRITIS OF RIGHT KNEE: Primary | ICD-10-CM

## 2021-10-01 DIAGNOSIS — C44.319 BASAL CELL CARCINOMA (BCC) OF SKIN OF OTHER PART OF FACE: ICD-10-CM

## 2021-10-01 PROCEDURE — 3078F DIAST BP <80 MM HG: CPT | Mod: S$GLB,,, | Performed by: FAMILY MEDICINE

## 2021-10-01 PROCEDURE — 1101F PR PT FALLS ASSESS DOC 0-1 FALLS W/OUT INJ PAST YR: ICD-10-PCS | Mod: S$GLB,,, | Performed by: PHYSICIAN ASSISTANT

## 2021-10-01 PROCEDURE — 1159F MED LIST DOCD IN RCRD: CPT | Mod: S$GLB,,, | Performed by: FAMILY MEDICINE

## 2021-10-01 PROCEDURE — 3077F SYST BP >= 140 MM HG: CPT | Mod: S$GLB,,, | Performed by: PHYSICIAN ASSISTANT

## 2021-10-01 PROCEDURE — 1160F PR REVIEW ALL MEDS BY PRESCRIBER/CLIN PHARMACIST DOCUMENTED: ICD-10-PCS | Mod: S$GLB,,, | Performed by: PHYSICIAN ASSISTANT

## 2021-10-01 PROCEDURE — 3079F PR MOST RECENT DIASTOLIC BLOOD PRESSURE 80-89 MM HG: ICD-10-PCS | Mod: S$GLB,,, | Performed by: PHYSICIAN ASSISTANT

## 2021-10-01 PROCEDURE — 3288F FALL RISK ASSESSMENT DOCD: CPT | Mod: S$GLB,,, | Performed by: FAMILY MEDICINE

## 2021-10-01 PROCEDURE — 88305 TISSUE EXAM BY PATHOLOGIST: CPT | Mod: 26,,, | Performed by: PATHOLOGY

## 2021-10-01 PROCEDURE — 1160F PR REVIEW ALL MEDS BY PRESCRIBER/CLIN PHARMACIST DOCUMENTED: ICD-10-PCS | Mod: S$GLB,,, | Performed by: FAMILY MEDICINE

## 2021-10-01 PROCEDURE — 11644 EXC F/E/E/N/L MAL+MRG 3.1-4: CPT | Mod: S$GLB,,, | Performed by: FAMILY MEDICINE

## 2021-10-01 PROCEDURE — 99212 OFFICE O/P EST SF 10 MIN: CPT | Mod: 25,S$GLB,, | Performed by: FAMILY MEDICINE

## 2021-10-01 PROCEDURE — 3077F PR MOST RECENT SYSTOLIC BLOOD PRESSURE >= 140 MM HG: ICD-10-PCS | Mod: S$GLB,,, | Performed by: PHYSICIAN ASSISTANT

## 2021-10-01 PROCEDURE — 3079F DIAST BP 80-89 MM HG: CPT | Mod: S$GLB,,, | Performed by: PHYSICIAN ASSISTANT

## 2021-10-01 PROCEDURE — 3074F PR MOST RECENT SYSTOLIC BLOOD PRESSURE < 130 MM HG: ICD-10-PCS | Mod: S$GLB,,, | Performed by: FAMILY MEDICINE

## 2021-10-01 PROCEDURE — 3288F PR FALLS RISK ASSESSMENT DOCUMENTED: ICD-10-PCS | Mod: S$GLB,,, | Performed by: FAMILY MEDICINE

## 2021-10-01 PROCEDURE — 1101F PT FALLS ASSESS-DOCD LE1/YR: CPT | Mod: S$GLB,,, | Performed by: PHYSICIAN ASSISTANT

## 2021-10-01 PROCEDURE — 99999 PR PBB SHADOW E&M-EST. PATIENT-LVL III: CPT | Mod: PBBFAC,,, | Performed by: PHYSICIAN ASSISTANT

## 2021-10-01 PROCEDURE — 99999 PR PBB SHADOW E&M-EST. PATIENT-LVL III: ICD-10-PCS | Mod: PBBFAC,,, | Performed by: PHYSICIAN ASSISTANT

## 2021-10-01 PROCEDURE — 3078F PR MOST RECENT DIASTOLIC BLOOD PRESSURE < 80 MM HG: ICD-10-PCS | Mod: S$GLB,,, | Performed by: FAMILY MEDICINE

## 2021-10-01 PROCEDURE — 1159F PR MEDICATION LIST DOCUMENTED IN MEDICAL RECORD: ICD-10-PCS | Mod: S$GLB,,, | Performed by: PHYSICIAN ASSISTANT

## 2021-10-01 PROCEDURE — 1159F PR MEDICATION LIST DOCUMENTED IN MEDICAL RECORD: ICD-10-PCS | Mod: S$GLB,,, | Performed by: FAMILY MEDICINE

## 2021-10-01 PROCEDURE — 1125F AMNT PAIN NOTED PAIN PRSNT: CPT | Mod: S$GLB,,, | Performed by: PHYSICIAN ASSISTANT

## 2021-10-01 PROCEDURE — 88305 TISSUE EXAM BY PATHOLOGIST: CPT | Performed by: PATHOLOGY

## 2021-10-01 PROCEDURE — 3288F PR FALLS RISK ASSESSMENT DOCUMENTED: ICD-10-PCS | Mod: S$GLB,,, | Performed by: PHYSICIAN ASSISTANT

## 2021-10-01 PROCEDURE — 11644 PR EXC SKIN MALIG 3.1-4 CM FACE,FACIAL: ICD-10-PCS | Mod: S$GLB,,, | Performed by: FAMILY MEDICINE

## 2021-10-01 PROCEDURE — 1125F PR PAIN SEVERITY QUANTIFIED, PAIN PRESENT: ICD-10-PCS | Mod: S$GLB,,, | Performed by: PHYSICIAN ASSISTANT

## 2021-10-01 PROCEDURE — 99499 NO LOS: ICD-10-PCS | Mod: S$GLB,,, | Performed by: PHYSICIAN ASSISTANT

## 2021-10-01 PROCEDURE — 3074F SYST BP LT 130 MM HG: CPT | Mod: S$GLB,,, | Performed by: FAMILY MEDICINE

## 2021-10-01 PROCEDURE — 1125F AMNT PAIN NOTED PAIN PRSNT: CPT | Mod: S$GLB,,, | Performed by: FAMILY MEDICINE

## 2021-10-01 PROCEDURE — 99999 PR PBB SHADOW E&M-EST. PATIENT-LVL IV: ICD-10-PCS | Mod: PBBFAC,,, | Performed by: FAMILY MEDICINE

## 2021-10-01 PROCEDURE — 1160F RVW MEDS BY RX/DR IN RCRD: CPT | Mod: S$GLB,,, | Performed by: FAMILY MEDICINE

## 2021-10-01 PROCEDURE — 1101F PR PT FALLS ASSESS DOC 0-1 FALLS W/OUT INJ PAST YR: ICD-10-PCS | Mod: S$GLB,,, | Performed by: FAMILY MEDICINE

## 2021-10-01 PROCEDURE — 88305 TISSUE EXAM BY PATHOLOGIST: ICD-10-PCS | Mod: 26,,, | Performed by: PATHOLOGY

## 2021-10-01 PROCEDURE — 20610 DRAIN/INJ JOINT/BURSA W/O US: CPT | Mod: RT,S$GLB,, | Performed by: PHYSICIAN ASSISTANT

## 2021-10-01 PROCEDURE — 3288F FALL RISK ASSESSMENT DOCD: CPT | Mod: S$GLB,,, | Performed by: PHYSICIAN ASSISTANT

## 2021-10-01 PROCEDURE — 99212 PR OFFICE/OUTPT VISIT, EST, LEVL II, 10-19 MIN: ICD-10-PCS | Mod: 25,S$GLB,, | Performed by: FAMILY MEDICINE

## 2021-10-01 PROCEDURE — 1125F PR PAIN SEVERITY QUANTIFIED, PAIN PRESENT: ICD-10-PCS | Mod: S$GLB,,, | Performed by: FAMILY MEDICINE

## 2021-10-01 PROCEDURE — 99499 UNLISTED E&M SERVICE: CPT | Mod: S$GLB,,, | Performed by: PHYSICIAN ASSISTANT

## 2021-10-01 PROCEDURE — 99999 PR PBB SHADOW E&M-EST. PATIENT-LVL IV: CPT | Mod: PBBFAC,,, | Performed by: FAMILY MEDICINE

## 2021-10-01 PROCEDURE — 20610 PR DRAIN/INJECT LARGE JOINT/BURSA: ICD-10-PCS | Mod: RT,S$GLB,, | Performed by: PHYSICIAN ASSISTANT

## 2021-10-01 PROCEDURE — 1159F MED LIST DOCD IN RCRD: CPT | Mod: S$GLB,,, | Performed by: PHYSICIAN ASSISTANT

## 2021-10-01 PROCEDURE — 1101F PT FALLS ASSESS-DOCD LE1/YR: CPT | Mod: S$GLB,,, | Performed by: FAMILY MEDICINE

## 2021-10-01 PROCEDURE — 1160F RVW MEDS BY RX/DR IN RCRD: CPT | Mod: S$GLB,,, | Performed by: PHYSICIAN ASSISTANT

## 2021-10-01 RX ORDER — AMLODIPINE BESYLATE 10 MG/1
10 TABLET ORAL DAILY
COMMUNITY
Start: 2021-09-04 | End: 2021-10-11 | Stop reason: ALTCHOICE

## 2021-10-05 ENCOUNTER — TELEPHONE (OUTPATIENT)
Dept: FAMILY MEDICINE | Facility: CLINIC | Age: 76
End: 2021-10-05

## 2021-10-05 LAB
FINAL PATHOLOGIC DIAGNOSIS: NORMAL
GROSS: NORMAL
Lab: NORMAL

## 2021-10-11 ENCOUNTER — OFFICE VISIT (OUTPATIENT)
Dept: FAMILY MEDICINE | Facility: CLINIC | Age: 76
End: 2021-10-11
Payer: MEDICARE

## 2021-10-11 VITALS
HEIGHT: 72 IN | SYSTOLIC BLOOD PRESSURE: 130 MMHG | DIASTOLIC BLOOD PRESSURE: 82 MMHG | BODY MASS INDEX: 39.19 KG/M2 | WEIGHT: 289.38 LBS | HEART RATE: 72 BPM | RESPIRATION RATE: 16 BRPM

## 2021-10-11 DIAGNOSIS — D49.2 NEOPLASM OF UNSPECIFIED BEHAVIOR OF BONE, SOFT TISSUE, AND SKIN: ICD-10-CM

## 2021-10-11 DIAGNOSIS — C44.319 BASAL CELL CARCINOMA (BCC) OF SKIN OF OTHER PART OF FACE: Primary | ICD-10-CM

## 2021-10-11 PROCEDURE — 1159F MED LIST DOCD IN RCRD: CPT | Mod: ,,, | Performed by: FAMILY MEDICINE

## 2021-10-11 PROCEDURE — 3288F FALL RISK ASSESSMENT DOCD: CPT | Mod: ,,, | Performed by: FAMILY MEDICINE

## 2021-10-11 PROCEDURE — 88305 TISSUE EXAM BY PATHOLOGIST: CPT | Performed by: PATHOLOGY

## 2021-10-11 PROCEDURE — 3075F PR MOST RECENT SYSTOLIC BLOOD PRESS GE 130-139MM HG: ICD-10-PCS | Mod: ,,, | Performed by: FAMILY MEDICINE

## 2021-10-11 PROCEDURE — 99999 PR PBB SHADOW E&M-EST. PATIENT-LVL IV: ICD-10-PCS | Mod: PBBFAC,,, | Performed by: FAMILY MEDICINE

## 2021-10-11 PROCEDURE — 3288F PR FALLS RISK ASSESSMENT DOCUMENTED: ICD-10-PCS | Mod: ,,, | Performed by: FAMILY MEDICINE

## 2021-10-11 PROCEDURE — 1126F AMNT PAIN NOTED NONE PRSNT: CPT | Mod: ,,, | Performed by: FAMILY MEDICINE

## 2021-10-11 PROCEDURE — 11644 EXC F/E/E/N/L MAL+MRG 3.1-4: CPT | Mod: 76,,, | Performed by: FAMILY MEDICINE

## 2021-10-11 PROCEDURE — 99999 PR PBB SHADOW E&M-EST. PATIENT-LVL IV: CPT | Mod: PBBFAC,,, | Performed by: FAMILY MEDICINE

## 2021-10-11 PROCEDURE — 3075F SYST BP GE 130 - 139MM HG: CPT | Mod: ,,, | Performed by: FAMILY MEDICINE

## 2021-10-11 PROCEDURE — 1101F PT FALLS ASSESS-DOCD LE1/YR: CPT | Mod: ,,, | Performed by: FAMILY MEDICINE

## 2021-10-11 PROCEDURE — 3079F DIAST BP 80-89 MM HG: CPT | Mod: ,,, | Performed by: FAMILY MEDICINE

## 2021-10-11 PROCEDURE — 1160F PR REVIEW ALL MEDS BY PRESCRIBER/CLIN PHARMACIST DOCUMENTED: ICD-10-PCS | Mod: ,,, | Performed by: FAMILY MEDICINE

## 2021-10-11 PROCEDURE — 1160F RVW MEDS BY RX/DR IN RCRD: CPT | Mod: ,,, | Performed by: FAMILY MEDICINE

## 2021-10-11 PROCEDURE — 1101F PR PT FALLS ASSESS DOC 0-1 FALLS W/OUT INJ PAST YR: ICD-10-PCS | Mod: ,,, | Performed by: FAMILY MEDICINE

## 2021-10-11 PROCEDURE — 88305 TISSUE EXAM BY PATHOLOGIST: CPT | Mod: 26,,, | Performed by: PATHOLOGY

## 2021-10-11 PROCEDURE — 88305 TISSUE EXAM BY PATHOLOGIST: ICD-10-PCS | Mod: 26,,, | Performed by: PATHOLOGY

## 2021-10-11 PROCEDURE — 1126F PR PAIN SEVERITY QUANTIFIED, NO PAIN PRESENT: ICD-10-PCS | Mod: ,,, | Performed by: FAMILY MEDICINE

## 2021-10-11 PROCEDURE — 11644 PR EXC SKIN MALIG 3.1-4 CM FACE,FACIAL: ICD-10-PCS | Mod: 76,,, | Performed by: FAMILY MEDICINE

## 2021-10-11 PROCEDURE — 3079F PR MOST RECENT DIASTOLIC BLOOD PRESSURE 80-89 MM HG: ICD-10-PCS | Mod: ,,, | Performed by: FAMILY MEDICINE

## 2021-10-11 PROCEDURE — 1159F PR MEDICATION LIST DOCUMENTED IN MEDICAL RECORD: ICD-10-PCS | Mod: ,,, | Performed by: FAMILY MEDICINE

## 2021-10-12 LAB
FINAL PATHOLOGIC DIAGNOSIS: NORMAL
GROSS: NORMAL
Lab: NORMAL

## 2021-10-15 ENCOUNTER — TELEPHONE (OUTPATIENT)
Dept: PREADMISSION TESTING | Facility: HOSPITAL | Age: 76
End: 2021-10-15

## 2021-10-18 ENCOUNTER — OFFICE VISIT (OUTPATIENT)
Dept: FAMILY MEDICINE | Facility: CLINIC | Age: 76
End: 2021-10-18
Payer: MEDICARE

## 2021-10-18 VITALS
DIASTOLIC BLOOD PRESSURE: 80 MMHG | RESPIRATION RATE: 16 BRPM | WEIGHT: 288.56 LBS | SYSTOLIC BLOOD PRESSURE: 134 MMHG | HEIGHT: 72 IN | BODY MASS INDEX: 39.08 KG/M2 | HEART RATE: 100 BPM

## 2021-10-18 DIAGNOSIS — I10 ESSENTIAL HYPERTENSION: ICD-10-CM

## 2021-10-18 PROCEDURE — 99499 UNLISTED E&M SERVICE: CPT | Mod: S$GLB,,, | Performed by: FAMILY MEDICINE

## 2021-10-18 PROCEDURE — 3075F PR MOST RECENT SYSTOLIC BLOOD PRESS GE 130-139MM HG: ICD-10-PCS | Mod: S$GLB,,, | Performed by: FAMILY MEDICINE

## 2021-10-18 PROCEDURE — 3288F PR FALLS RISK ASSESSMENT DOCUMENTED: ICD-10-PCS | Mod: S$GLB,,, | Performed by: FAMILY MEDICINE

## 2021-10-18 PROCEDURE — 3079F PR MOST RECENT DIASTOLIC BLOOD PRESSURE 80-89 MM HG: ICD-10-PCS | Mod: S$GLB,,, | Performed by: FAMILY MEDICINE

## 2021-10-18 PROCEDURE — 1126F AMNT PAIN NOTED NONE PRSNT: CPT | Mod: S$GLB,,, | Performed by: FAMILY MEDICINE

## 2021-10-18 PROCEDURE — 1160F PR REVIEW ALL MEDS BY PRESCRIBER/CLIN PHARMACIST DOCUMENTED: ICD-10-PCS | Mod: S$GLB,,, | Performed by: FAMILY MEDICINE

## 2021-10-18 PROCEDURE — 99999 PR PBB SHADOW E&M-EST. PATIENT-LVL IV: ICD-10-PCS | Mod: PBBFAC,,, | Performed by: FAMILY MEDICINE

## 2021-10-18 PROCEDURE — 1126F PR PAIN SEVERITY QUANTIFIED, NO PAIN PRESENT: ICD-10-PCS | Mod: S$GLB,,, | Performed by: FAMILY MEDICINE

## 2021-10-18 PROCEDURE — 99999 PR PBB SHADOW E&M-EST. PATIENT-LVL IV: CPT | Mod: PBBFAC,,, | Performed by: FAMILY MEDICINE

## 2021-10-18 PROCEDURE — 3079F DIAST BP 80-89 MM HG: CPT | Mod: S$GLB,,, | Performed by: FAMILY MEDICINE

## 2021-10-18 PROCEDURE — 99499 NO LOS: ICD-10-PCS | Mod: S$GLB,,, | Performed by: FAMILY MEDICINE

## 2021-10-18 PROCEDURE — 1159F PR MEDICATION LIST DOCUMENTED IN MEDICAL RECORD: ICD-10-PCS | Mod: S$GLB,,, | Performed by: FAMILY MEDICINE

## 2021-10-18 PROCEDURE — 3288F FALL RISK ASSESSMENT DOCD: CPT | Mod: S$GLB,,, | Performed by: FAMILY MEDICINE

## 2021-10-18 PROCEDURE — 1101F PT FALLS ASSESS-DOCD LE1/YR: CPT | Mod: S$GLB,,, | Performed by: FAMILY MEDICINE

## 2021-10-18 PROCEDURE — 1160F RVW MEDS BY RX/DR IN RCRD: CPT | Mod: S$GLB,,, | Performed by: FAMILY MEDICINE

## 2021-10-18 PROCEDURE — 1101F PR PT FALLS ASSESS DOC 0-1 FALLS W/OUT INJ PAST YR: ICD-10-PCS | Mod: S$GLB,,, | Performed by: FAMILY MEDICINE

## 2021-10-18 PROCEDURE — 3075F SYST BP GE 130 - 139MM HG: CPT | Mod: S$GLB,,, | Performed by: FAMILY MEDICINE

## 2021-10-18 PROCEDURE — 1159F MED LIST DOCD IN RCRD: CPT | Mod: S$GLB,,, | Performed by: FAMILY MEDICINE

## 2021-10-18 RX ORDER — POTASSIUM CHLORIDE 20 MEQ/1
20 TABLET, EXTENDED RELEASE ORAL DAILY
Qty: 30 TABLET | Refills: 5 | Status: SHIPPED | OUTPATIENT
Start: 2021-10-18 | End: 2022-02-07

## 2021-11-01 ENCOUNTER — OFFICE VISIT (OUTPATIENT)
Dept: INTERNAL MEDICINE | Facility: CLINIC | Age: 76
End: 2021-11-01
Payer: MEDICARE

## 2021-11-01 ENCOUNTER — OFFICE VISIT (OUTPATIENT)
Dept: ORTHOPEDICS | Facility: CLINIC | Age: 76
End: 2021-11-01
Payer: MEDICARE

## 2021-11-01 ENCOUNTER — HOSPITAL ENCOUNTER (OUTPATIENT)
Dept: RADIOLOGY | Facility: HOSPITAL | Age: 76
Discharge: HOME | End: 2021-11-01
Attending: PHYSICIAN ASSISTANT
Payer: MEDICARE

## 2021-11-01 ENCOUNTER — HOSPITAL ENCOUNTER (OUTPATIENT)
Dept: CARDIOLOGY | Facility: CLINIC | Age: 76
Discharge: HOME | End: 2021-11-01
Payer: MEDICARE

## 2021-11-01 ENCOUNTER — EDUCATION (OUTPATIENT)
Dept: ORTHOPEDICS | Facility: CLINIC | Age: 76
End: 2021-11-01

## 2021-11-01 VITALS
BODY MASS INDEX: 39.68 KG/M2 | OXYGEN SATURATION: 95 % | HEART RATE: 84 BPM | HEIGHT: 72 IN | WEIGHT: 293 LBS | SYSTOLIC BLOOD PRESSURE: 132 MMHG | TEMPERATURE: 98 F | DIASTOLIC BLOOD PRESSURE: 72 MMHG

## 2021-11-01 VITALS — WEIGHT: 291 LBS | BODY MASS INDEX: 39.42 KG/M2 | HEIGHT: 72 IN

## 2021-11-01 DIAGNOSIS — Z95.0 PACEMAKER: ICD-10-CM

## 2021-11-01 DIAGNOSIS — G56.03 BILATERAL CARPAL TUNNEL SYNDROME: ICD-10-CM

## 2021-11-01 DIAGNOSIS — E78.2 MIXED HYPERLIPIDEMIA: ICD-10-CM

## 2021-11-01 DIAGNOSIS — I10 PRIMARY HYPERTENSION: ICD-10-CM

## 2021-11-01 DIAGNOSIS — M17.12 PRIMARY OSTEOARTHRITIS OF LEFT KNEE: Primary | ICD-10-CM

## 2021-11-01 DIAGNOSIS — I10 HYPERTENSION, UNSPECIFIED TYPE: ICD-10-CM

## 2021-11-01 DIAGNOSIS — M79.605 PAIN OF LEFT LOWER EXTREMITY: ICD-10-CM

## 2021-11-01 DIAGNOSIS — M17.12 PRIMARY OSTEOARTHRITIS OF LEFT KNEE: ICD-10-CM

## 2021-11-01 DIAGNOSIS — G62.9 POLYNEUROPATHY: ICD-10-CM

## 2021-11-01 DIAGNOSIS — G47.33 OSA ON CPAP: ICD-10-CM

## 2021-11-01 PROCEDURE — 1159F MED LIST DOCD IN RCRD: CPT | Mod: S$GLB,,, | Performed by: HOSPITALIST

## 2021-11-01 PROCEDURE — 1159F PR MEDICATION LIST DOCUMENTED IN MEDICAL RECORD: ICD-10-PCS | Mod: S$GLB,,, | Performed by: HOSPITALIST

## 2021-11-01 PROCEDURE — 1159F MED LIST DOCD IN RCRD: CPT | Mod: S$GLB,,, | Performed by: PHYSICIAN ASSISTANT

## 2021-11-01 PROCEDURE — 3288F PR FALLS RISK ASSESSMENT DOCUMENTED: ICD-10-PCS | Mod: S$GLB,,, | Performed by: PHYSICIAN ASSISTANT

## 2021-11-01 PROCEDURE — 1101F PT FALLS ASSESS-DOCD LE1/YR: CPT | Mod: S$GLB,,, | Performed by: PHYSICIAN ASSISTANT

## 2021-11-01 PROCEDURE — 99999 PR PBB SHADOW E&M-EST. PATIENT-LVL IV: CPT | Mod: PBBFAC,,,

## 2021-11-01 PROCEDURE — 99999 PR PBB SHADOW E&M-EST. PATIENT-LVL IV: ICD-10-PCS | Mod: PBBFAC,,, | Performed by: PHYSICIAN ASSISTANT

## 2021-11-01 PROCEDURE — 3078F PR MOST RECENT DIASTOLIC BLOOD PRESSURE < 80 MM HG: ICD-10-PCS | Mod: S$GLB,,, | Performed by: HOSPITALIST

## 2021-11-01 PROCEDURE — 73560 X-RAY EXAM OF KNEE 1 OR 2: CPT | Mod: 26,LT,, | Performed by: RADIOLOGY

## 2021-11-01 PROCEDURE — 3078F DIAST BP <80 MM HG: CPT | Mod: S$GLB,,, | Performed by: HOSPITALIST

## 2021-11-01 PROCEDURE — 1160F RVW MEDS BY RX/DR IN RCRD: CPT | Mod: S$GLB,,, | Performed by: HOSPITALIST

## 2021-11-01 PROCEDURE — 1101F PR PT FALLS ASSESS DOC 0-1 FALLS W/OUT INJ PAST YR: ICD-10-PCS | Mod: S$GLB,,, | Performed by: PHYSICIAN ASSISTANT

## 2021-11-01 PROCEDURE — 99999 PR PBB SHADOW E&M-EST. PATIENT-LVL IV: ICD-10-PCS | Mod: PBBFAC,,,

## 2021-11-01 PROCEDURE — 93010 EKG 12-LEAD: ICD-10-PCS | Mod: S$GLB,,, | Performed by: INTERNAL MEDICINE

## 2021-11-01 PROCEDURE — 93010 ELECTROCARDIOGRAM REPORT: CPT | Mod: S$GLB,,, | Performed by: INTERNAL MEDICINE

## 2021-11-01 PROCEDURE — 1125F PR PAIN SEVERITY QUANTIFIED, PAIN PRESENT: ICD-10-PCS | Mod: S$GLB,,, | Performed by: PHYSICIAN ASSISTANT

## 2021-11-01 PROCEDURE — 99214 PR OFFICE/OUTPT VISIT, EST, LEVL IV, 30-39 MIN: ICD-10-PCS | Mod: S$GLB,,, | Performed by: HOSPITALIST

## 2021-11-01 PROCEDURE — 1160F PR REVIEW ALL MEDS BY PRESCRIBER/CLIN PHARMACIST DOCUMENTED: ICD-10-PCS | Mod: S$GLB,,, | Performed by: PHYSICIAN ASSISTANT

## 2021-11-01 PROCEDURE — 99999 PR PBB SHADOW E&M-EST. PATIENT-LVL IV: CPT | Mod: PBBFAC,,, | Performed by: PHYSICIAN ASSISTANT

## 2021-11-01 PROCEDURE — 1160F PR REVIEW ALL MEDS BY PRESCRIBER/CLIN PHARMACIST DOCUMENTED: ICD-10-PCS | Mod: S$GLB,,, | Performed by: HOSPITALIST

## 2021-11-01 PROCEDURE — 1125F AMNT PAIN NOTED PAIN PRSNT: CPT | Mod: S$GLB,,, | Performed by: PHYSICIAN ASSISTANT

## 2021-11-01 PROCEDURE — 99499 UNLISTED E&M SERVICE: CPT | Mod: S$GLB,,, | Performed by: PHYSICIAN ASSISTANT

## 2021-11-01 PROCEDURE — 3288F FALL RISK ASSESSMENT DOCD: CPT | Mod: S$GLB,,, | Performed by: PHYSICIAN ASSISTANT

## 2021-11-01 PROCEDURE — 99214 OFFICE O/P EST MOD 30 MIN: CPT | Mod: S$GLB,,, | Performed by: HOSPITALIST

## 2021-11-01 PROCEDURE — 93005 EKG 12-LEAD: ICD-10-PCS | Mod: S$GLB,,, | Performed by: ANESTHESIOLOGY

## 2021-11-01 PROCEDURE — 99499 NO LOS: ICD-10-PCS | Mod: S$GLB,,, | Performed by: PHYSICIAN ASSISTANT

## 2021-11-01 PROCEDURE — 93005 ELECTROCARDIOGRAM TRACING: CPT | Mod: S$GLB,,, | Performed by: ANESTHESIOLOGY

## 2021-11-01 PROCEDURE — 3075F PR MOST RECENT SYSTOLIC BLOOD PRESS GE 130-139MM HG: ICD-10-PCS | Mod: S$GLB,,, | Performed by: HOSPITALIST

## 2021-11-01 PROCEDURE — 1159F PR MEDICATION LIST DOCUMENTED IN MEDICAL RECORD: ICD-10-PCS | Mod: S$GLB,,, | Performed by: PHYSICIAN ASSISTANT

## 2021-11-01 PROCEDURE — 3075F SYST BP GE 130 - 139MM HG: CPT | Mod: S$GLB,,, | Performed by: HOSPITALIST

## 2021-11-01 PROCEDURE — 1160F RVW MEDS BY RX/DR IN RCRD: CPT | Mod: S$GLB,,, | Performed by: PHYSICIAN ASSISTANT

## 2021-11-01 PROCEDURE — 73560 XR KNEE 1 OR 2 VIEW LEFT: ICD-10-PCS | Mod: 26,LT,, | Performed by: RADIOLOGY

## 2021-11-01 PROCEDURE — 73560 X-RAY EXAM OF KNEE 1 OR 2: CPT | Mod: TC,LT

## 2021-11-01 RX ORDER — ROPIVACAINE HYDROCHLORIDE 2 MG/ML
8 INJECTION, SOLUTION EPIDURAL; INFILTRATION; PERINEURAL CONTINUOUS
Status: CANCELLED | OUTPATIENT
Start: 2021-11-01

## 2021-11-01 RX ORDER — CELECOXIB 200 MG/1
200 CAPSULE ORAL DAILY
Status: CANCELLED | OUTPATIENT
Start: 2021-11-01

## 2021-11-01 RX ORDER — TALC
6 POWDER (GRAM) TOPICAL NIGHTLY PRN
Status: CANCELLED | OUTPATIENT
Start: 2021-11-01

## 2021-11-01 RX ORDER — PREGABALIN 75 MG/1
75 CAPSULE ORAL NIGHTLY
Status: CANCELLED | OUTPATIENT
Start: 2021-11-01

## 2021-11-01 RX ORDER — CEFAZOLIN SODIUM 2 G/50ML
2 SOLUTION INTRAVENOUS
Status: CANCELLED | OUTPATIENT
Start: 2021-11-01 | End: 2021-11-02

## 2021-11-01 RX ORDER — AMOXICILLIN 250 MG
1 CAPSULE ORAL 2 TIMES DAILY
Status: CANCELLED | OUTPATIENT
Start: 2021-11-01

## 2021-11-01 RX ORDER — VITAMIN B COMPLEX
1 CAPSULE ORAL DAILY
COMMUNITY

## 2021-11-01 RX ORDER — ONDANSETRON 2 MG/ML
4 INJECTION INTRAMUSCULAR; INTRAVENOUS EVERY 8 HOURS PRN
Status: CANCELLED | OUTPATIENT
Start: 2021-11-01

## 2021-11-01 RX ORDER — SODIUM CHLORIDE 9 MG/ML
INJECTION, SOLUTION INTRAVENOUS CONTINUOUS
Status: CANCELLED | OUTPATIENT
Start: 2021-11-01 | End: 2021-11-02

## 2021-11-01 RX ORDER — MUPIROCIN 20 MG/G
1 OINTMENT TOPICAL
Status: CANCELLED | OUTPATIENT
Start: 2021-11-01

## 2021-11-01 RX ORDER — PROCHLORPERAZINE EDISYLATE 5 MG/ML
5 INJECTION INTRAMUSCULAR; INTRAVENOUS EVERY 6 HOURS PRN
Status: CANCELLED | OUTPATIENT
Start: 2021-11-01

## 2021-11-01 RX ORDER — MORPHINE SULFATE 2 MG/ML
2 INJECTION, SOLUTION INTRAMUSCULAR; INTRAVENOUS
Status: CANCELLED | OUTPATIENT
Start: 2021-11-01

## 2021-11-01 RX ORDER — FAMOTIDINE 20 MG/1
20 TABLET, FILM COATED ORAL 2 TIMES DAILY
Status: CANCELLED | OUTPATIENT
Start: 2021-11-01

## 2021-11-01 RX ORDER — PREGABALIN 75 MG/1
75 CAPSULE ORAL
Status: CANCELLED | OUTPATIENT
Start: 2021-11-01

## 2021-11-01 RX ORDER — SODIUM CHLORIDE 0.9 % (FLUSH) 0.9 %
10 SYRINGE (ML) INJECTION
Status: CANCELLED | OUTPATIENT
Start: 2021-11-01

## 2021-11-01 RX ORDER — ACETAMINOPHEN 500 MG
1000 TABLET ORAL
Status: CANCELLED | OUTPATIENT
Start: 2021-11-01

## 2021-11-01 RX ORDER — OXYCODONE HYDROCHLORIDE 5 MG/1
5 TABLET ORAL
Status: CANCELLED | OUTPATIENT
Start: 2021-11-01

## 2021-11-01 RX ORDER — FENTANYL CITRATE 50 UG/ML
25 INJECTION, SOLUTION INTRAMUSCULAR; INTRAVENOUS EVERY 5 MIN PRN
Status: CANCELLED | OUTPATIENT
Start: 2021-11-01

## 2021-11-01 RX ORDER — ASPIRIN 81 MG/1
81 TABLET ORAL 2 TIMES DAILY
Status: CANCELLED | OUTPATIENT
Start: 2021-11-01

## 2021-11-01 RX ORDER — MUPIROCIN 20 MG/G
1 OINTMENT TOPICAL 2 TIMES DAILY
Status: CANCELLED | OUTPATIENT
Start: 2021-11-01 | End: 2021-11-06

## 2021-11-01 RX ORDER — BISACODYL 10 MG
10 SUPPOSITORY, RECTAL RECTAL EVERY 12 HOURS PRN
Status: CANCELLED | OUTPATIENT
Start: 2021-11-01

## 2021-11-01 RX ORDER — NALOXONE HCL 0.4 MG/ML
0.02 VIAL (ML) INJECTION
Status: CANCELLED | OUTPATIENT
Start: 2021-11-01

## 2021-11-01 RX ORDER — METHOCARBAMOL 750 MG/1
750 TABLET, FILM COATED ORAL 3 TIMES DAILY
Status: CANCELLED | OUTPATIENT
Start: 2021-11-01

## 2021-11-01 RX ORDER — ACETAMINOPHEN 500 MG
1000 TABLET ORAL EVERY 6 HOURS
Status: CANCELLED | OUTPATIENT
Start: 2021-11-01 | End: 2021-11-03

## 2021-11-01 RX ORDER — LIDOCAINE HYDROCHLORIDE 10 MG/ML
1 INJECTION, SOLUTION EPIDURAL; INFILTRATION; INTRACAUDAL; PERINEURAL
Status: CANCELLED | OUTPATIENT
Start: 2021-11-01

## 2021-11-01 RX ORDER — POLYETHYLENE GLYCOL 3350 17 G/17G
17 POWDER, FOR SOLUTION ORAL DAILY
Status: CANCELLED | OUTPATIENT
Start: 2021-11-01

## 2021-11-01 RX ORDER — MIDAZOLAM HYDROCHLORIDE 1 MG/ML
1 INJECTION INTRAMUSCULAR; INTRAVENOUS EVERY 5 MIN PRN
Status: CANCELLED | OUTPATIENT
Start: 2021-11-01

## 2021-11-01 RX ORDER — CELECOXIB 200 MG/1
400 CAPSULE ORAL
Status: CANCELLED | OUTPATIENT
Start: 2021-11-01

## 2021-11-01 RX ORDER — SODIUM CHLORIDE 9 MG/ML
INJECTION, SOLUTION INTRAVENOUS
Status: CANCELLED | OUTPATIENT
Start: 2021-11-01

## 2021-11-01 RX ORDER — OXYCODONE HYDROCHLORIDE 5 MG/1
10 TABLET ORAL
Status: CANCELLED | OUTPATIENT
Start: 2021-11-01

## 2021-11-02 DIAGNOSIS — M17.12 PRIMARY OSTEOARTHRITIS OF LEFT KNEE: Primary | ICD-10-CM

## 2021-11-03 ENCOUNTER — PATIENT MESSAGE (OUTPATIENT)
Dept: ORTHOPEDICS | Facility: CLINIC | Age: 76
End: 2021-11-03
Payer: MEDICARE

## 2021-11-06 ENCOUNTER — LAB VISIT (OUTPATIENT)
Dept: SPORTS MEDICINE | Facility: CLINIC | Age: 76
End: 2021-11-06
Payer: MEDICARE

## 2021-11-06 DIAGNOSIS — Z01.818 PRE-OP TESTING: ICD-10-CM

## 2021-11-06 LAB
SARS-COV-2 RNA RESP QL NAA+PROBE: NOT DETECTED
SARS-COV-2- CYCLE NUMBER: NORMAL

## 2021-11-06 PROCEDURE — U0005 INFEC AGEN DETEC AMPLI PROBE: HCPCS | Performed by: ORTHOPAEDIC SURGERY

## 2021-11-06 PROCEDURE — U0003 INFECTIOUS AGENT DETECTION BY NUCLEIC ACID (DNA OR RNA); SEVERE ACUTE RESPIRATORY SYNDROME CORONAVIRUS 2 (SARS-COV-2) (CORONAVIRUS DISEASE [COVID-19]), AMPLIFIED PROBE TECHNIQUE, MAKING USE OF HIGH THROUGHPUT TECHNOLOGIES AS DESCRIBED BY CMS-2020-01-R: HCPCS | Performed by: ORTHOPAEDIC SURGERY

## 2021-11-08 ENCOUNTER — PATIENT MESSAGE (OUTPATIENT)
Dept: ADMINISTRATIVE | Facility: OTHER | Age: 76
End: 2021-11-08
Payer: MEDICARE

## 2021-11-08 ENCOUNTER — TELEPHONE (OUTPATIENT)
Dept: ORTHOPEDICS | Facility: CLINIC | Age: 76
End: 2021-11-08
Payer: MEDICARE

## 2021-11-08 RX ORDER — CELECOXIB 200 MG/1
200 CAPSULE ORAL DAILY
Qty: 30 CAPSULE | Refills: 0 | Status: SHIPPED | OUTPATIENT
Start: 2021-11-08 | End: 2021-12-11

## 2021-11-08 RX ORDER — DEXTROMETHORPHAN HYDROBROMIDE, GUAIFENESIN 5; 100 MG/5ML; MG/5ML
650 LIQUID ORAL
Qty: 120 TABLET | Refills: 0 | Status: SHIPPED | OUTPATIENT
Start: 2021-11-08 | End: 2022-04-12

## 2021-11-08 RX ORDER — ROPIVACAINE HYDROCHLORIDE 2 MG/ML
INJECTION, SOLUTION EPIDURAL; INFILTRATION; PERINEURAL CONTINUOUS
Status: CANCELLED | OUTPATIENT
Start: 2021-11-08

## 2021-11-08 RX ORDER — METHOCARBAMOL 750 MG/1
750 TABLET, FILM COATED ORAL 3 TIMES DAILY PRN
Qty: 30 TABLET | Refills: 0 | Status: SHIPPED | OUTPATIENT
Start: 2021-11-08 | End: 2022-04-12

## 2021-11-08 RX ORDER — OXYCODONE HYDROCHLORIDE 5 MG/1
TABLET ORAL
Qty: 50 TABLET | Refills: 0 | Status: SHIPPED | OUTPATIENT
Start: 2021-11-08 | End: 2021-11-24 | Stop reason: SDUPTHER

## 2021-11-08 RX ORDER — ASPIRIN 81 MG/1
81 TABLET ORAL 2 TIMES DAILY
Qty: 60 TABLET | Refills: 0 | Status: SHIPPED | OUTPATIENT
Start: 2021-11-08 | End: 2024-03-26

## 2021-11-08 RX ORDER — DOCUSATE SODIUM 100 MG/1
100 CAPSULE, LIQUID FILLED ORAL 2 TIMES DAILY PRN
Qty: 60 CAPSULE | Refills: 0 | Status: SHIPPED | OUTPATIENT
Start: 2021-11-08 | End: 2022-04-12

## 2021-11-09 ENCOUNTER — ANESTHESIA EVENT (OUTPATIENT)
Dept: SURGERY | Facility: HOSPITAL | Age: 76
End: 2021-11-09
Payer: MEDICARE

## 2021-11-09 ENCOUNTER — HOSPITAL ENCOUNTER (OUTPATIENT)
Facility: HOSPITAL | Age: 76
Discharge: HOME | End: 2021-11-09
Attending: ORTHOPAEDIC SURGERY | Admitting: ORTHOPAEDIC SURGERY
Payer: MEDICARE

## 2021-11-09 ENCOUNTER — TELEPHONE (OUTPATIENT)
Dept: ORTHOPEDICS | Facility: CLINIC | Age: 76
End: 2021-11-09
Payer: MEDICARE

## 2021-11-09 DIAGNOSIS — M17.12 PRIMARY OSTEOARTHRITIS OF LEFT KNEE: Primary | ICD-10-CM

## 2021-11-09 DIAGNOSIS — M17.12 PRIMARY OSTEOARTHRITIS OF LEFT KNEE: ICD-10-CM

## 2021-11-09 PROCEDURE — 94761 N-INVAS EAR/PLS OXIMETRY MLT: CPT

## 2021-11-09 PROCEDURE — 99499 UNLISTED E&M SERVICE: CPT | Mod: ,,, | Performed by: ORTHOPAEDIC SURGERY

## 2021-11-09 PROCEDURE — 99900035 HC TECH TIME PER 15 MIN (STAT)

## 2021-11-09 PROCEDURE — 99499 NO LOS: ICD-10-PCS | Mod: ,,, | Performed by: ORTHOPAEDIC SURGERY

## 2021-11-09 RX ORDER — FENTANYL CITRATE 50 UG/ML
25 INJECTION, SOLUTION INTRAMUSCULAR; INTRAVENOUS EVERY 5 MIN PRN
Status: CANCELLED | OUTPATIENT
Start: 2021-11-09

## 2021-11-09 RX ORDER — ONDANSETRON 2 MG/ML
4 INJECTION INTRAMUSCULAR; INTRAVENOUS DAILY PRN
Status: CANCELLED | OUTPATIENT
Start: 2021-11-09

## 2021-11-09 RX ORDER — FINASTERIDE 5 MG/1
5 TABLET, FILM COATED ORAL
Status: CANCELLED | OUTPATIENT
Start: 2021-11-09 | End: 2021-11-09

## 2021-11-09 RX ORDER — MUPIROCIN 20 MG/G
1 OINTMENT TOPICAL
Status: DISCONTINUED | OUTPATIENT
Start: 2021-11-09 | End: 2021-11-09 | Stop reason: HOSPADM

## 2021-11-09 RX ORDER — FENTANYL CITRATE 50 UG/ML
25 INJECTION, SOLUTION INTRAMUSCULAR; INTRAVENOUS EVERY 5 MIN PRN
Status: DISCONTINUED | OUTPATIENT
Start: 2021-11-09 | End: 2021-11-09 | Stop reason: HOSPADM

## 2021-11-09 RX ORDER — MIDAZOLAM HYDROCHLORIDE 1 MG/ML
.5-4 INJECTION INTRAMUSCULAR; INTRAVENOUS
Status: DISPENSED | OUTPATIENT
Start: 2021-11-09

## 2021-11-09 RX ORDER — MIDAZOLAM HYDROCHLORIDE 1 MG/ML
1 INJECTION INTRAMUSCULAR; INTRAVENOUS EVERY 5 MIN PRN
Status: DISCONTINUED | OUTPATIENT
Start: 2021-11-09 | End: 2021-11-09 | Stop reason: HOSPADM

## 2021-11-09 RX ORDER — SODIUM CHLORIDE 0.9 % (FLUSH) 0.9 %
10 SYRINGE (ML) INJECTION
Status: CANCELLED | OUTPATIENT
Start: 2021-11-09

## 2021-11-09 RX ORDER — CELECOXIB 200 MG/1
400 CAPSULE ORAL
Status: DISCONTINUED | OUTPATIENT
Start: 2021-11-09 | End: 2021-11-09 | Stop reason: HOSPADM

## 2021-11-09 RX ORDER — TRANEXAMIC ACID 100 MG/ML
1000 INJECTION, SOLUTION INTRAVENOUS
Status: DISCONTINUED | OUTPATIENT
Start: 2021-11-09 | End: 2021-11-09 | Stop reason: HOSPADM

## 2021-11-09 RX ORDER — LIDOCAINE HYDROCHLORIDE 10 MG/ML
1 INJECTION, SOLUTION EPIDURAL; INFILTRATION; INTRACAUDAL; PERINEURAL
Status: DISCONTINUED | OUTPATIENT
Start: 2021-11-09 | End: 2021-11-09 | Stop reason: HOSPADM

## 2021-11-09 RX ORDER — SODIUM CHLORIDE 9 MG/ML
INJECTION, SOLUTION INTRAVENOUS
Status: DISCONTINUED | OUTPATIENT
Start: 2021-11-09 | End: 2021-11-09 | Stop reason: HOSPADM

## 2021-11-09 RX ORDER — FENTANYL CITRATE 50 UG/ML
25-200 INJECTION, SOLUTION INTRAMUSCULAR; INTRAVENOUS
Status: DISPENSED | OUTPATIENT
Start: 2021-11-09

## 2021-11-09 RX ORDER — HALOPERIDOL 5 MG/ML
0.5 INJECTION INTRAMUSCULAR EVERY 10 MIN PRN
Status: CANCELLED | OUTPATIENT
Start: 2021-11-09

## 2021-11-09 RX ORDER — OXYCODONE HYDROCHLORIDE 5 MG/1
5 TABLET ORAL
Status: CANCELLED | OUTPATIENT
Start: 2021-11-09

## 2021-11-09 RX ORDER — ACETAMINOPHEN 500 MG
1000 TABLET ORAL
Status: DISCONTINUED | OUTPATIENT
Start: 2021-11-09 | End: 2021-11-09 | Stop reason: HOSPADM

## 2021-11-09 RX ORDER — PREGABALIN 75 MG/1
75 CAPSULE ORAL
Status: DISCONTINUED | OUTPATIENT
Start: 2021-11-09 | End: 2021-11-09 | Stop reason: HOSPADM

## 2021-11-10 ENCOUNTER — TELEPHONE (OUTPATIENT)
Dept: ORTHOPEDICS | Facility: CLINIC | Age: 76
End: 2021-11-10
Payer: MEDICARE

## 2021-11-10 DIAGNOSIS — M17.12 PRIMARY OSTEOARTHRITIS OF LEFT KNEE: Primary | ICD-10-CM

## 2021-11-10 RX ORDER — AMOXICILLIN 250 MG
1 CAPSULE ORAL 2 TIMES DAILY
Status: CANCELLED | OUTPATIENT
Start: 2021-11-10

## 2021-11-10 RX ORDER — POLYETHYLENE GLYCOL 3350 17 G/17G
17 POWDER, FOR SOLUTION ORAL DAILY
Status: CANCELLED | OUTPATIENT
Start: 2021-11-10

## 2021-11-10 RX ORDER — TALC
6 POWDER (GRAM) TOPICAL NIGHTLY PRN
Status: CANCELLED | OUTPATIENT
Start: 2021-11-10

## 2021-11-10 RX ORDER — CELECOXIB 200 MG/1
200 CAPSULE ORAL DAILY
Status: CANCELLED | OUTPATIENT
Start: 2021-11-10

## 2021-11-10 RX ORDER — ASPIRIN 81 MG/1
81 TABLET ORAL 2 TIMES DAILY
Status: CANCELLED | OUTPATIENT
Start: 2021-11-10

## 2021-11-10 RX ORDER — OXYCODONE HYDROCHLORIDE 5 MG/1
5 TABLET ORAL
Status: CANCELLED | OUTPATIENT
Start: 2021-11-10

## 2021-11-10 RX ORDER — CEFAZOLIN SODIUM 2 G/50ML
2 SOLUTION INTRAVENOUS
Status: CANCELLED | OUTPATIENT
Start: 2021-11-10 | End: 2021-11-11

## 2021-11-10 RX ORDER — PREGABALIN 75 MG/1
75 CAPSULE ORAL NIGHTLY
Status: CANCELLED | OUTPATIENT
Start: 2021-11-10

## 2021-11-10 RX ORDER — MUPIROCIN 20 MG/G
1 OINTMENT TOPICAL 2 TIMES DAILY
Status: CANCELLED | OUTPATIENT
Start: 2021-11-10 | End: 2021-11-15

## 2021-11-10 RX ORDER — MUPIROCIN 20 MG/G
1 OINTMENT TOPICAL
Status: CANCELLED | OUTPATIENT
Start: 2021-11-10

## 2021-11-10 RX ORDER — SODIUM CHLORIDE 9 MG/ML
INJECTION, SOLUTION INTRAVENOUS
Status: CANCELLED | OUTPATIENT
Start: 2021-11-10

## 2021-11-10 RX ORDER — MIDAZOLAM HYDROCHLORIDE 1 MG/ML
1 INJECTION INTRAMUSCULAR; INTRAVENOUS EVERY 5 MIN PRN
Status: CANCELLED | OUTPATIENT
Start: 2021-11-10

## 2021-11-10 RX ORDER — NALOXONE HCL 0.4 MG/ML
0.02 VIAL (ML) INJECTION
Status: CANCELLED | OUTPATIENT
Start: 2021-11-10

## 2021-11-10 RX ORDER — METHOCARBAMOL 750 MG/1
750 TABLET, FILM COATED ORAL 3 TIMES DAILY
Status: CANCELLED | OUTPATIENT
Start: 2021-11-10

## 2021-11-10 RX ORDER — PROCHLORPERAZINE EDISYLATE 5 MG/ML
5 INJECTION INTRAMUSCULAR; INTRAVENOUS EVERY 6 HOURS PRN
Status: CANCELLED | OUTPATIENT
Start: 2021-11-10

## 2021-11-10 RX ORDER — FENTANYL CITRATE 50 UG/ML
25 INJECTION, SOLUTION INTRAMUSCULAR; INTRAVENOUS EVERY 5 MIN PRN
Status: CANCELLED | OUTPATIENT
Start: 2021-11-10

## 2021-11-10 RX ORDER — LIDOCAINE HYDROCHLORIDE 10 MG/ML
1 INJECTION, SOLUTION EPIDURAL; INFILTRATION; INTRACAUDAL; PERINEURAL
Status: CANCELLED | OUTPATIENT
Start: 2021-11-10

## 2021-11-10 RX ORDER — ACETAMINOPHEN 500 MG
1000 TABLET ORAL EVERY 6 HOURS
Status: CANCELLED | OUTPATIENT
Start: 2021-11-10 | End: 2021-11-12

## 2021-11-10 RX ORDER — PREGABALIN 75 MG/1
75 CAPSULE ORAL
Status: CANCELLED | OUTPATIENT
Start: 2021-11-10

## 2021-11-10 RX ORDER — ROPIVACAINE HYDROCHLORIDE 2 MG/ML
8 INJECTION, SOLUTION EPIDURAL; INFILTRATION; PERINEURAL CONTINUOUS
Status: CANCELLED | OUTPATIENT
Start: 2021-11-10

## 2021-11-10 RX ORDER — BISACODYL 10 MG
10 SUPPOSITORY, RECTAL RECTAL EVERY 12 HOURS PRN
Status: CANCELLED | OUTPATIENT
Start: 2021-11-10

## 2021-11-10 RX ORDER — CELECOXIB 200 MG/1
400 CAPSULE ORAL
Status: CANCELLED | OUTPATIENT
Start: 2021-11-10

## 2021-11-10 RX ORDER — SODIUM CHLORIDE 0.9 % (FLUSH) 0.9 %
10 SYRINGE (ML) INJECTION
Status: CANCELLED | OUTPATIENT
Start: 2021-11-10

## 2021-11-10 RX ORDER — FAMOTIDINE 20 MG/1
20 TABLET, FILM COATED ORAL 2 TIMES DAILY
Status: CANCELLED | OUTPATIENT
Start: 2021-11-10

## 2021-11-10 RX ORDER — ONDANSETRON 2 MG/ML
4 INJECTION INTRAMUSCULAR; INTRAVENOUS EVERY 8 HOURS PRN
Status: CANCELLED | OUTPATIENT
Start: 2021-11-10

## 2021-11-10 RX ORDER — MORPHINE SULFATE 2 MG/ML
2 INJECTION, SOLUTION INTRAMUSCULAR; INTRAVENOUS
Status: CANCELLED | OUTPATIENT
Start: 2021-11-10

## 2021-11-10 RX ORDER — OXYCODONE HYDROCHLORIDE 5 MG/1
10 TABLET ORAL
Status: CANCELLED | OUTPATIENT
Start: 2021-11-10

## 2021-11-10 RX ORDER — ACETAMINOPHEN 500 MG
1000 TABLET ORAL
Status: CANCELLED | OUTPATIENT
Start: 2021-11-10

## 2021-11-10 RX ORDER — SODIUM CHLORIDE 9 MG/ML
INJECTION, SOLUTION INTRAVENOUS CONTINUOUS
Status: CANCELLED | OUTPATIENT
Start: 2021-11-10 | End: 2021-11-11

## 2021-11-11 ENCOUNTER — ANESTHESIA (OUTPATIENT)
Dept: SURGERY | Facility: HOSPITAL | Age: 76
End: 2021-11-11
Payer: MEDICARE

## 2021-11-11 ENCOUNTER — HOSPITAL ENCOUNTER (OUTPATIENT)
Facility: HOSPITAL | Age: 76
Discharge: HOME | End: 2021-11-11
Attending: ORTHOPAEDIC SURGERY | Admitting: ORTHOPAEDIC SURGERY
Payer: MEDICARE

## 2021-11-11 VITALS
OXYGEN SATURATION: 95 % | HEIGHT: 72 IN | RESPIRATION RATE: 21 BRPM | HEART RATE: 60 BPM | SYSTOLIC BLOOD PRESSURE: 173 MMHG | TEMPERATURE: 98 F | DIASTOLIC BLOOD PRESSURE: 84 MMHG | BODY MASS INDEX: 39.68 KG/M2 | WEIGHT: 293 LBS

## 2021-11-11 DIAGNOSIS — M17.12 PRIMARY OSTEOARTHRITIS OF LEFT KNEE: ICD-10-CM

## 2021-11-11 PROCEDURE — D9220A PRA ANESTHESIA: ICD-10-PCS | Mod: ANES,,, | Performed by: STUDENT IN AN ORGANIZED HEALTH CARE EDUCATION/TRAINING PROGRAM

## 2021-11-11 PROCEDURE — 27200750 HC INSULATED NEEDLE/ STIMUPLEX: Performed by: STUDENT IN AN ORGANIZED HEALTH CARE EDUCATION/TRAINING PROGRAM

## 2021-11-11 PROCEDURE — C1713 ANCHOR/SCREW BN/BN,TIS/BN: HCPCS | Performed by: ORTHOPAEDIC SURGERY

## 2021-11-11 PROCEDURE — 25000003 PHARM REV CODE 250: Performed by: NURSE ANESTHETIST, CERTIFIED REGISTERED

## 2021-11-11 PROCEDURE — 94761 N-INVAS EAR/PLS OXIMETRY MLT: CPT

## 2021-11-11 PROCEDURE — 36000711: Performed by: ORTHOPAEDIC SURGERY

## 2021-11-11 PROCEDURE — 71000039 HC RECOVERY, EACH ADD'L HOUR: Performed by: ORTHOPAEDIC SURGERY

## 2021-11-11 PROCEDURE — 76942 ECHO GUIDE FOR BIOPSY: CPT | Mod: 26,,, | Performed by: STUDENT IN AN ORGANIZED HEALTH CARE EDUCATION/TRAINING PROGRAM

## 2021-11-11 PROCEDURE — 27201423 OPTIME MED/SURG SUP & DEVICES STERILE SUPPLY: Performed by: ORTHOPAEDIC SURGERY

## 2021-11-11 PROCEDURE — 99900035 HC TECH TIME PER 15 MIN (STAT)

## 2021-11-11 PROCEDURE — C1751 CATH, INF, PER/CENT/MIDLINE: HCPCS | Performed by: SURGERY

## 2021-11-11 PROCEDURE — 36000710: Performed by: ORTHOPAEDIC SURGERY

## 2021-11-11 PROCEDURE — 76942 ECHO GUIDE FOR BIOPSY: CPT | Performed by: SURGERY

## 2021-11-11 PROCEDURE — D9220A PRA ANESTHESIA: Mod: CRNA,,, | Performed by: NURSE ANESTHETIST, CERTIFIED REGISTERED

## 2021-11-11 PROCEDURE — 76942 PR U/S GUIDANCE FOR NEEDLE GUIDANCE: ICD-10-PCS | Mod: 26,,, | Performed by: STUDENT IN AN ORGANIZED HEALTH CARE EDUCATION/TRAINING PROGRAM

## 2021-11-11 PROCEDURE — C1776 JOINT DEVICE (IMPLANTABLE): HCPCS | Performed by: ORTHOPAEDIC SURGERY

## 2021-11-11 PROCEDURE — 97116 GAIT TRAINING THERAPY: CPT

## 2021-11-11 PROCEDURE — 64448 PR NERVE BLOCK INJ, ANES/STEROID, FEMORAL, CONT INFUSION, INCL IMAG GUIDANCE: ICD-10-PCS | Mod: 59,LT,, | Performed by: STUDENT IN AN ORGANIZED HEALTH CARE EDUCATION/TRAINING PROGRAM

## 2021-11-11 PROCEDURE — 63600175 PHARM REV CODE 636 W HCPCS: Performed by: STUDENT IN AN ORGANIZED HEALTH CARE EDUCATION/TRAINING PROGRAM

## 2021-11-11 PROCEDURE — 37000008 HC ANESTHESIA 1ST 15 MINUTES: Performed by: ORTHOPAEDIC SURGERY

## 2021-11-11 PROCEDURE — 71000033 HC RECOVERY, INTIAL HOUR: Performed by: ORTHOPAEDIC SURGERY

## 2021-11-11 PROCEDURE — 71000015 HC POSTOP RECOV 1ST HR: Performed by: ORTHOPAEDIC SURGERY

## 2021-11-11 PROCEDURE — 97161 PT EVAL LOW COMPLEX 20 MIN: CPT

## 2021-11-11 PROCEDURE — D9220A PRA ANESTHESIA: Mod: ANES,,, | Performed by: STUDENT IN AN ORGANIZED HEALTH CARE EDUCATION/TRAINING PROGRAM

## 2021-11-11 PROCEDURE — 25000003 PHARM REV CODE 250: Performed by: PHYSICIAN ASSISTANT

## 2021-11-11 PROCEDURE — 63600175 PHARM REV CODE 636 W HCPCS: Performed by: NURSE ANESTHETIST, CERTIFIED REGISTERED

## 2021-11-11 PROCEDURE — 64448 NJX AA&/STRD FEM NRV NFS IMG: CPT | Mod: 59,LT,, | Performed by: STUDENT IN AN ORGANIZED HEALTH CARE EDUCATION/TRAINING PROGRAM

## 2021-11-11 PROCEDURE — 63600175 PHARM REV CODE 636 W HCPCS: Performed by: PHYSICIAN ASSISTANT

## 2021-11-11 PROCEDURE — 97165 OT EVAL LOW COMPLEX 30 MIN: CPT

## 2021-11-11 PROCEDURE — 27447 PR TOTAL KNEE ARTHROPLASTY: ICD-10-PCS | Mod: LT,,, | Performed by: ORTHOPAEDIC SURGERY

## 2021-11-11 PROCEDURE — D9220A PRA ANESTHESIA: ICD-10-PCS | Mod: CRNA,,, | Performed by: NURSE ANESTHETIST, CERTIFIED REGISTERED

## 2021-11-11 PROCEDURE — 27447 TOTAL KNEE ARTHROPLASTY: CPT | Mod: LT,,, | Performed by: ORTHOPAEDIC SURGERY

## 2021-11-11 PROCEDURE — 97535 SELF CARE MNGMENT TRAINING: CPT

## 2021-11-11 PROCEDURE — 37000009 HC ANESTHESIA EA ADD 15 MINS: Performed by: ORTHOPAEDIC SURGERY

## 2021-11-11 DEVICE — COMP FEM POST STAB CEM SZ 6 LT: Type: IMPLANTABLE DEVICE | Site: KNEE | Status: FUNCTIONAL

## 2021-11-11 DEVICE — PATELLA TRIATHLON 33X9 SYMTRC: Type: IMPLANTABLE DEVICE | Site: KNEE | Status: FUNCTIONAL

## 2021-11-11 DEVICE — INSERT TRIATHLON SZ6 9MM: Type: IMPLANTABLE DEVICE | Site: KNEE | Status: FUNCTIONAL

## 2021-11-11 DEVICE — BASEPLATE TIB CEM PRIM SZ 6: Type: IMPLANTABLE DEVICE | Site: KNEE | Status: FUNCTIONAL

## 2021-11-11 DEVICE — CEMENT BONE SMPLX HV GENTMYCN: Type: IMPLANTABLE DEVICE | Site: KNEE | Status: FUNCTIONAL

## 2021-11-11 RX ORDER — AMOXICILLIN 250 MG
1 CAPSULE ORAL 2 TIMES DAILY
Status: DISCONTINUED | OUTPATIENT
Start: 2021-11-11 | End: 2021-11-11 | Stop reason: HOSPADM

## 2021-11-11 RX ORDER — ONDANSETRON 2 MG/ML
4 INJECTION INTRAMUSCULAR; INTRAVENOUS EVERY 8 HOURS PRN
Status: DISCONTINUED | OUTPATIENT
Start: 2021-11-11 | End: 2021-11-11 | Stop reason: HOSPADM

## 2021-11-11 RX ORDER — TRANEXAMIC ACID 100 MG/ML
1000 INJECTION, SOLUTION INTRAVENOUS
Status: DISCONTINUED | OUTPATIENT
Start: 2021-11-11 | End: 2021-11-11 | Stop reason: HOSPADM

## 2021-11-11 RX ORDER — PHENYLEPHRINE HYDROCHLORIDE 10 MG/ML
INJECTION INTRAVENOUS
Status: DISCONTINUED | OUTPATIENT
Start: 2021-11-11 | End: 2021-11-11

## 2021-11-11 RX ORDER — LIDOCAINE HYDROCHLORIDE 10 MG/ML
1 INJECTION, SOLUTION EPIDURAL; INFILTRATION; INTRACAUDAL; PERINEURAL
Status: DISCONTINUED | OUTPATIENT
Start: 2021-11-11 | End: 2021-11-11 | Stop reason: HOSPADM

## 2021-11-11 RX ORDER — OXYCODONE HYDROCHLORIDE 5 MG/1
5 TABLET ORAL
Status: DISCONTINUED | OUTPATIENT
Start: 2021-11-11 | End: 2021-11-11 | Stop reason: HOSPADM

## 2021-11-11 RX ORDER — PROPOFOL 10 MG/ML
VIAL (ML) INTRAVENOUS CONTINUOUS PRN
Status: DISCONTINUED | OUTPATIENT
Start: 2021-11-11 | End: 2021-11-11

## 2021-11-11 RX ORDER — MUPIROCIN 20 MG/G
1 OINTMENT TOPICAL
Status: COMPLETED | OUTPATIENT
Start: 2021-11-11 | End: 2021-11-11

## 2021-11-11 RX ORDER — CEFAZOLIN SODIUM 1 G/3ML
INJECTION, POWDER, FOR SOLUTION INTRAMUSCULAR; INTRAVENOUS
Status: DISCONTINUED | OUTPATIENT
Start: 2021-11-11 | End: 2021-11-11

## 2021-11-11 RX ORDER — ONDANSETRON 2 MG/ML
INJECTION INTRAMUSCULAR; INTRAVENOUS
Status: DISCONTINUED | OUTPATIENT
Start: 2021-11-11 | End: 2021-11-11

## 2021-11-11 RX ORDER — PREGABALIN 75 MG/1
75 CAPSULE ORAL
Status: COMPLETED | OUTPATIENT
Start: 2021-11-11 | End: 2021-11-11

## 2021-11-11 RX ORDER — TALC
6 POWDER (GRAM) TOPICAL NIGHTLY PRN
Status: DISCONTINUED | OUTPATIENT
Start: 2021-11-11 | End: 2021-11-11 | Stop reason: HOSPADM

## 2021-11-11 RX ORDER — FAMOTIDINE 20 MG/1
20 TABLET, FILM COATED ORAL 2 TIMES DAILY
Status: DISCONTINUED | OUTPATIENT
Start: 2021-11-11 | End: 2021-11-11 | Stop reason: HOSPADM

## 2021-11-11 RX ORDER — ACETAMINOPHEN 500 MG
1000 TABLET ORAL EVERY 6 HOURS
Status: DISCONTINUED | OUTPATIENT
Start: 2021-11-11 | End: 2021-11-11 | Stop reason: HOSPADM

## 2021-11-11 RX ORDER — CEFAZOLIN SODIUM 1 G/3ML
2 INJECTION, POWDER, FOR SOLUTION INTRAMUSCULAR; INTRAVENOUS
Status: DISCONTINUED | OUTPATIENT
Start: 2021-11-11 | End: 2021-11-11 | Stop reason: HOSPADM

## 2021-11-11 RX ORDER — MUPIROCIN 20 MG/G
1 OINTMENT TOPICAL 2 TIMES DAILY
Status: DISCONTINUED | OUTPATIENT
Start: 2021-11-11 | End: 2021-11-11 | Stop reason: HOSPADM

## 2021-11-11 RX ORDER — ROPIVACAINE HYDROCHLORIDE 2 MG/ML
INJECTION, SOLUTION EPIDURAL; INFILTRATION; PERINEURAL CONTINUOUS
Status: DISCONTINUED | OUTPATIENT
Start: 2021-11-11 | End: 2021-11-11 | Stop reason: HOSPADM

## 2021-11-11 RX ORDER — POLYETHYLENE GLYCOL 3350 17 G/17G
17 POWDER, FOR SOLUTION ORAL DAILY
Status: DISCONTINUED | OUTPATIENT
Start: 2021-11-11 | End: 2021-11-11 | Stop reason: HOSPADM

## 2021-11-11 RX ORDER — METHOCARBAMOL 750 MG/1
750 TABLET, FILM COATED ORAL 3 TIMES DAILY
Status: DISCONTINUED | OUTPATIENT
Start: 2021-11-11 | End: 2021-11-11 | Stop reason: HOSPADM

## 2021-11-11 RX ORDER — FENTANYL CITRATE 50 UG/ML
25 INJECTION, SOLUTION INTRAMUSCULAR; INTRAVENOUS EVERY 5 MIN PRN
Status: DISCONTINUED | OUTPATIENT
Start: 2021-11-11 | End: 2021-11-11 | Stop reason: HOSPADM

## 2021-11-11 RX ORDER — FAMOTIDINE 10 MG/ML
INJECTION INTRAVENOUS
Status: DISCONTINUED | OUTPATIENT
Start: 2021-11-11 | End: 2021-11-11

## 2021-11-11 RX ORDER — ASPIRIN 81 MG/1
81 TABLET ORAL 2 TIMES DAILY
Status: DISCONTINUED | OUTPATIENT
Start: 2021-11-11 | End: 2021-11-11 | Stop reason: HOSPADM

## 2021-11-11 RX ORDER — PROCHLORPERAZINE EDISYLATE 5 MG/ML
5 INJECTION INTRAMUSCULAR; INTRAVENOUS EVERY 6 HOURS PRN
Status: DISCONTINUED | OUTPATIENT
Start: 2021-11-11 | End: 2021-11-11 | Stop reason: HOSPADM

## 2021-11-11 RX ORDER — PREGABALIN 75 MG/1
75 CAPSULE ORAL NIGHTLY
Status: DISCONTINUED | OUTPATIENT
Start: 2021-11-11 | End: 2021-11-11 | Stop reason: HOSPADM

## 2021-11-11 RX ORDER — BISACODYL 10 MG
10 SUPPOSITORY, RECTAL RECTAL EVERY 12 HOURS PRN
Status: DISCONTINUED | OUTPATIENT
Start: 2021-11-11 | End: 2021-11-11 | Stop reason: HOSPADM

## 2021-11-11 RX ORDER — MORPHINE SULFATE 2 MG/ML
2 INJECTION, SOLUTION INTRAMUSCULAR; INTRAVENOUS
Status: DISCONTINUED | OUTPATIENT
Start: 2021-11-11 | End: 2021-11-11 | Stop reason: HOSPADM

## 2021-11-11 RX ORDER — ROPIVACAINE HYDROCHLORIDE 5 MG/ML
INJECTION, SOLUTION EPIDURAL; INFILTRATION; PERINEURAL
Status: COMPLETED | OUTPATIENT
Start: 2021-11-11 | End: 2021-11-11

## 2021-11-11 RX ORDER — SODIUM CHLORIDE 9 MG/ML
INJECTION, SOLUTION INTRAVENOUS CONTINUOUS
Status: DISCONTINUED | OUTPATIENT
Start: 2021-11-11 | End: 2021-11-11 | Stop reason: HOSPADM

## 2021-11-11 RX ORDER — NALOXONE HCL 0.4 MG/ML
0.02 VIAL (ML) INJECTION
Status: DISCONTINUED | OUTPATIENT
Start: 2021-11-11 | End: 2021-11-11 | Stop reason: HOSPADM

## 2021-11-11 RX ORDER — ACETAMINOPHEN 500 MG
1000 TABLET ORAL
Status: COMPLETED | OUTPATIENT
Start: 2021-11-11 | End: 2021-11-11

## 2021-11-11 RX ORDER — KETAMINE HCL IN 0.9 % NACL 50 MG/5 ML
SYRINGE (ML) INTRAVENOUS
Status: DISCONTINUED | OUTPATIENT
Start: 2021-11-11 | End: 2021-11-11

## 2021-11-11 RX ORDER — DEXAMETHASONE SODIUM PHOSPHATE 4 MG/ML
INJECTION, SOLUTION INTRA-ARTICULAR; INTRALESIONAL; INTRAMUSCULAR; INTRAVENOUS; SOFT TISSUE
Status: DISCONTINUED | OUTPATIENT
Start: 2021-11-11 | End: 2021-11-11

## 2021-11-11 RX ORDER — SODIUM CHLORIDE 0.9 % (FLUSH) 0.9 %
10 SYRINGE (ML) INJECTION
Status: DISCONTINUED | OUTPATIENT
Start: 2021-11-11 | End: 2021-11-11 | Stop reason: HOSPADM

## 2021-11-11 RX ORDER — TRANEXAMIC ACID 100 MG/ML
INJECTION, SOLUTION INTRAVENOUS
Status: DISCONTINUED | OUTPATIENT
Start: 2021-11-11 | End: 2021-11-11

## 2021-11-11 RX ORDER — OXYCODONE HYDROCHLORIDE 5 MG/1
10 TABLET ORAL
Status: DISCONTINUED | OUTPATIENT
Start: 2021-11-11 | End: 2021-11-11 | Stop reason: HOSPADM

## 2021-11-11 RX ORDER — SODIUM CHLORIDE 9 MG/ML
INJECTION, SOLUTION INTRAVENOUS
Status: COMPLETED | OUTPATIENT
Start: 2021-11-11 | End: 2021-11-11

## 2021-11-11 RX ORDER — CELECOXIB 200 MG/1
400 CAPSULE ORAL
Status: COMPLETED | OUTPATIENT
Start: 2021-11-11 | End: 2021-11-11

## 2021-11-11 RX ORDER — ROPIVACAINE HYDROCHLORIDE 2 MG/ML
8 INJECTION, SOLUTION EPIDURAL; INFILTRATION; PERINEURAL CONTINUOUS
Status: DISCONTINUED | OUTPATIENT
Start: 2021-11-11 | End: 2021-11-11

## 2021-11-11 RX ORDER — MIDAZOLAM HYDROCHLORIDE 1 MG/ML
1 INJECTION INTRAMUSCULAR; INTRAVENOUS EVERY 5 MIN PRN
Status: DISCONTINUED | OUTPATIENT
Start: 2021-11-11 | End: 2021-11-11 | Stop reason: HOSPADM

## 2021-11-11 RX ORDER — FENTANYL CITRATE 50 UG/ML
INJECTION, SOLUTION INTRAMUSCULAR; INTRAVENOUS
Status: DISCONTINUED | OUTPATIENT
Start: 2021-11-11 | End: 2021-11-11

## 2021-11-11 RX ADMIN — Medication 30 MG: at 08:11

## 2021-11-11 RX ADMIN — SODIUM CHLORIDE: 9 INJECTION, SOLUTION INTRAVENOUS at 08:11

## 2021-11-11 RX ADMIN — PHENYLEPHRINE HYDROCHLORIDE 100 MCG: 10 INJECTION INTRAVENOUS at 10:11

## 2021-11-11 RX ADMIN — Medication: at 11:11

## 2021-11-11 RX ADMIN — MUPIROCIN 1 G: 20 OINTMENT TOPICAL at 07:11

## 2021-11-11 RX ADMIN — TRANEXAMIC ACID 1000 MG: 100 INJECTION, SOLUTION INTRAVENOUS at 10:11

## 2021-11-11 RX ADMIN — MIDAZOLAM 2 MG: 1 INJECTION INTRAMUSCULAR; INTRAVENOUS at 08:11

## 2021-11-11 RX ADMIN — PHENYLEPHRINE HYDROCHLORIDE 100 MCG: 10 INJECTION INTRAVENOUS at 09:11

## 2021-11-11 RX ADMIN — VANCOMYCIN HYDROCHLORIDE 1500 MG: 1.5 INJECTION, POWDER, LYOPHILIZED, FOR SOLUTION INTRAVENOUS at 07:11

## 2021-11-11 RX ADMIN — PHENYLEPHRINE HYDROCHLORIDE 200 MCG: 10 INJECTION INTRAVENOUS at 09:11

## 2021-11-11 RX ADMIN — ACETAMINOPHEN 1000 MG: 500 TABLET ORAL at 07:11

## 2021-11-11 RX ADMIN — OXYCODONE 5 MG: 5 TABLET ORAL at 11:11

## 2021-11-11 RX ADMIN — PROPOFOL 75 MCG/KG/MIN: 10 INJECTION, EMULSION INTRAVENOUS at 08:11

## 2021-11-11 RX ADMIN — FAMOTIDINE 20 MG: 10 INJECTION, SOLUTION INTRAVENOUS at 08:11

## 2021-11-11 RX ADMIN — FENTANYL CITRATE 50 MCG: 50 INJECTION, SOLUTION INTRAMUSCULAR; INTRAVENOUS at 08:11

## 2021-11-11 RX ADMIN — CELECOXIB 400 MG: 200 CAPSULE ORAL at 07:11

## 2021-11-11 RX ADMIN — CEFAZOLIN 3 G: 330 INJECTION, POWDER, FOR SOLUTION INTRAMUSCULAR; INTRAVENOUS at 08:11

## 2021-11-11 RX ADMIN — PREGABALIN 75 MG: 75 CAPSULE ORAL at 07:11

## 2021-11-11 RX ADMIN — DOCUSATE SODIUM 50MG AND SENNOSIDES 8.6MG 1 TABLET: 8.6; 5 TABLET, FILM COATED ORAL at 01:11

## 2021-11-11 RX ADMIN — ROPIVACAINE HYDROCHLORIDE 10 ML: 5 INJECTION, SOLUTION EPIDURAL; INFILTRATION; PERINEURAL at 08:11

## 2021-11-11 RX ADMIN — ONDANSETRON 4 MG: 2 INJECTION, SOLUTION INTRAMUSCULAR; INTRAVENOUS at 08:11

## 2021-11-11 RX ADMIN — MEPIVACAINE HYDROCHLORIDE 3.5 ML: 15 INJECTION, SOLUTION EPIDURAL; INFILTRATION at 08:11

## 2021-11-11 RX ADMIN — TRANEXAMIC ACID 1000 MG: 100 INJECTION, SOLUTION INTRAVENOUS at 08:11

## 2021-11-11 RX ADMIN — ACETAMINOPHEN 1000 MG: 500 TABLET ORAL at 01:11

## 2021-11-11 RX ADMIN — SODIUM CHLORIDE: 0.9 INJECTION, SOLUTION INTRAVENOUS at 07:11

## 2021-11-11 RX ADMIN — DEXAMETHASONE SODIUM PHOSPHATE 8 MG: 4 INJECTION, SOLUTION INTRAMUSCULAR; INTRAVENOUS at 08:11

## 2021-11-12 ENCOUNTER — CLINICAL SUPPORT (OUTPATIENT)
Dept: ORTHOPEDICS | Facility: CLINIC | Age: 76
End: 2021-11-12
Payer: MEDICARE

## 2021-11-12 DIAGNOSIS — Z96.659 STATUS POST KNEE REPLACEMENT, UNSPECIFIED LATERALITY: Primary | ICD-10-CM

## 2021-11-12 PROCEDURE — 99499 UNLISTED E&M SERVICE: CPT | Mod: 95,,, | Performed by: ORTHOPAEDIC SURGERY

## 2021-11-12 PROCEDURE — 99499 NO LOS: ICD-10-PCS | Mod: 95,,, | Performed by: ORTHOPAEDIC SURGERY

## 2021-11-24 ENCOUNTER — OFFICE VISIT (OUTPATIENT)
Dept: ORTHOPEDICS | Facility: CLINIC | Age: 76
End: 2021-11-24
Payer: MEDICARE

## 2021-11-24 VITALS — HEIGHT: 72 IN | BODY MASS INDEX: 39.68 KG/M2 | WEIGHT: 293 LBS

## 2021-11-24 DIAGNOSIS — Z96.652 STATUS POST TOTAL LEFT KNEE REPLACEMENT: Primary | ICD-10-CM

## 2021-11-24 PROCEDURE — 99999 PR PBB SHADOW E&M-EST. PATIENT-LVL III: ICD-10-PCS | Mod: PBBFAC,,, | Performed by: PHYSICIAN ASSISTANT

## 2021-11-24 PROCEDURE — 99024 PR POST-OP FOLLOW-UP VISIT: ICD-10-PCS | Mod: S$GLB,,, | Performed by: PHYSICIAN ASSISTANT

## 2021-11-24 PROCEDURE — 99024 POSTOP FOLLOW-UP VISIT: CPT | Mod: S$GLB,,, | Performed by: PHYSICIAN ASSISTANT

## 2021-11-24 PROCEDURE — 99999 PR PBB SHADOW E&M-EST. PATIENT-LVL III: CPT | Mod: PBBFAC,,, | Performed by: PHYSICIAN ASSISTANT

## 2021-11-24 RX ORDER — OXYCODONE HYDROCHLORIDE 5 MG/1
TABLET ORAL
Qty: 30 TABLET | Refills: 0 | Status: SHIPPED | OUTPATIENT
Start: 2021-11-24 | End: 2022-04-12

## 2021-12-15 DIAGNOSIS — Z96.652 STATUS POST TOTAL LEFT KNEE REPLACEMENT: Primary | ICD-10-CM

## 2021-12-22 ENCOUNTER — HOSPITAL ENCOUNTER (OUTPATIENT)
Dept: RADIOLOGY | Facility: HOSPITAL | Age: 76
Discharge: HOME | End: 2021-12-22
Attending: PHYSICIAN ASSISTANT
Payer: MEDICARE

## 2021-12-22 ENCOUNTER — OFFICE VISIT (OUTPATIENT)
Dept: ORTHOPEDICS | Facility: CLINIC | Age: 76
End: 2021-12-22
Payer: MEDICARE

## 2021-12-22 VITALS — WEIGHT: 282.19 LBS | BODY MASS INDEX: 38.22 KG/M2 | HEIGHT: 72 IN

## 2021-12-22 DIAGNOSIS — Z96.652 STATUS POST TOTAL LEFT KNEE REPLACEMENT: ICD-10-CM

## 2021-12-22 DIAGNOSIS — Z96.652 STATUS POST TOTAL LEFT KNEE REPLACEMENT: Primary | ICD-10-CM

## 2021-12-22 PROCEDURE — 73560 XR KNEE ORTHO LEFT: ICD-10-PCS | Mod: 26,RT,, | Performed by: RADIOLOGY

## 2021-12-22 PROCEDURE — 1101F PT FALLS ASSESS-DOCD LE1/YR: CPT | Mod: CPTII,S$GLB,, | Performed by: PHYSICIAN ASSISTANT

## 2021-12-22 PROCEDURE — 1125F PR PAIN SEVERITY QUANTIFIED, PAIN PRESENT: ICD-10-PCS | Mod: CPTII,S$GLB,, | Performed by: PHYSICIAN ASSISTANT

## 2021-12-22 PROCEDURE — 73560 X-RAY EXAM OF KNEE 1 OR 2: CPT | Mod: 26,RT,, | Performed by: RADIOLOGY

## 2021-12-22 PROCEDURE — 3288F PR FALLS RISK ASSESSMENT DOCUMENTED: ICD-10-PCS | Mod: CPTII,S$GLB,, | Performed by: PHYSICIAN ASSISTANT

## 2021-12-22 PROCEDURE — 99024 PR POST-OP FOLLOW-UP VISIT: ICD-10-PCS | Mod: S$GLB,,, | Performed by: PHYSICIAN ASSISTANT

## 2021-12-22 PROCEDURE — 73562 XR KNEE ORTHO LEFT: ICD-10-PCS | Mod: 26,LT,, | Performed by: RADIOLOGY

## 2021-12-22 PROCEDURE — 1159F PR MEDICATION LIST DOCUMENTED IN MEDICAL RECORD: ICD-10-PCS | Mod: CPTII,S$GLB,, | Performed by: PHYSICIAN ASSISTANT

## 2021-12-22 PROCEDURE — 1125F AMNT PAIN NOTED PAIN PRSNT: CPT | Mod: CPTII,S$GLB,, | Performed by: PHYSICIAN ASSISTANT

## 2021-12-22 PROCEDURE — 1101F PR PT FALLS ASSESS DOC 0-1 FALLS W/OUT INJ PAST YR: ICD-10-PCS | Mod: CPTII,S$GLB,, | Performed by: PHYSICIAN ASSISTANT

## 2021-12-22 PROCEDURE — 3288F FALL RISK ASSESSMENT DOCD: CPT | Mod: CPTII,S$GLB,, | Performed by: PHYSICIAN ASSISTANT

## 2021-12-22 PROCEDURE — 99999 PR PBB SHADOW E&M-EST. PATIENT-LVL III: CPT | Mod: PBBFAC,,, | Performed by: PHYSICIAN ASSISTANT

## 2021-12-22 PROCEDURE — 99024 POSTOP FOLLOW-UP VISIT: CPT | Mod: S$GLB,,, | Performed by: PHYSICIAN ASSISTANT

## 2021-12-22 PROCEDURE — 99999 PR PBB SHADOW E&M-EST. PATIENT-LVL III: ICD-10-PCS | Mod: PBBFAC,,, | Performed by: PHYSICIAN ASSISTANT

## 2021-12-22 PROCEDURE — 73560 X-RAY EXAM OF KNEE 1 OR 2: CPT | Mod: TC,RT

## 2021-12-22 PROCEDURE — 73562 X-RAY EXAM OF KNEE 3: CPT | Mod: 26,LT,, | Performed by: RADIOLOGY

## 2021-12-22 PROCEDURE — 1159F MED LIST DOCD IN RCRD: CPT | Mod: CPTII,S$GLB,, | Performed by: PHYSICIAN ASSISTANT

## 2021-12-28 RX ORDER — MONTELUKAST SODIUM 10 MG/1
TABLET ORAL
Qty: 90 TABLET | Refills: 3 | Status: SHIPPED | OUTPATIENT
Start: 2021-12-28 | End: 2022-01-11

## 2021-12-28 RX ORDER — AMLODIPINE BESYLATE 10 MG/1
TABLET ORAL
Qty: 90 TABLET | Refills: 3 | OUTPATIENT
Start: 2021-12-28

## 2022-01-11 DIAGNOSIS — I10 ESSENTIAL HYPERTENSION: ICD-10-CM

## 2022-01-11 RX ORDER — MONTELUKAST SODIUM 10 MG/1
TABLET ORAL
Qty: 90 TABLET | Refills: 3 | Status: SHIPPED | OUTPATIENT
Start: 2022-01-11 | End: 2023-03-06

## 2022-01-11 NOTE — TELEPHONE ENCOUNTER
No new care gaps identified.  Powered by [employer]. Reference number: [id number].   1/11/2022 9:02:58 AM CST

## 2022-01-12 RX ORDER — LOSARTAN POTASSIUM 50 MG/1
TABLET ORAL
Qty: 90 TABLET | Refills: 3 | Status: SHIPPED | OUTPATIENT
Start: 2022-01-12 | End: 2022-04-11 | Stop reason: SDUPTHER

## 2022-01-12 NOTE — TELEPHONE ENCOUNTER
Refill Routing Note   Medication(s) are not appropriate for processing by [facility]:    - Required vitals are abnormal           Medication reconciliation completed: No      Automatic Epic Protocol Generated Data:    Requested Prescriptions   Pending Prescriptions Disp Refills    losartan (COZAAR) 50 MG tablet [Pharmacy Med Name: LOSARTAN 50MG TABLETS] 90 tablet 3     Sig: TAKE 1 TABLET BY MOUTH EVERY DAY       Cardiovascular:  Angiotensin Receptor Blockers Failed - 1/11/2022  9:02 AM        Failed - Last BP in normal range within 360 days     BP Readings from Last 1 Encounters:   11/11/21 (!) 173/84               Passed - Patient is at least 18 years old        Passed - Valid encounter within last 15 months     Recent Visits  Date Type Provider Dept   10/18/21 Office Visit Leroy Alston MD [facility] Family Medicine   10/11/21 Office Visit Leroy Alston MD [facility]   10/01/21 Office Visit Leroy Alston MD [facility]   06/16/21 Office Visit Leroy Alston MD [facility] Internal Medicine   03/19/21 Office Visit Leroy Alston MD [facility] Internal Medicine II   09/14/20 Office Visit Leroy Alston MD [facility]   03/20/20 Office Visit Leroy Alston MD [facility]   Showing recent visits within past 720 days and meeting all other requirements  Future Appointments  No visits were found meeting these conditions.  Showing future appointments within next 150 days and meeting all other requirements      Future Appointments              In 4 weeks MD Nikita Morris - [facility]oped[facility], Nikita Perez    In 3 months Leroy Alston MD [place] [facility]                Passed - Cr is 1.39 or below and within 360 days     Lab Results   Component Value Date    CREATININE 1.1 11/01/2021    CREATININE 1.0 03/30/2021    CREATININE 1.0 09/14/2020              Passed - K is 5.2 or below and within 360 days     Potassium    Date Value Ref Range Status   11/01/2021 4.1 3.5 - 5.1 mmol/L Final   03/30/2021 3.4 (L) 3.5 - 5.1 mmol/L Final   09/14/2020 4.1 3.5 - 5.1 mmol/L Final              Passed - eGFR within 360 days     Lab Results   Component Value Date    EGFRNONAA >60.0 11/01/2021    EGFRNONAA >60 03/30/2021    EGFRNONAA >60 09/14/2020                 Signed Prescriptions Disp Refills    montelukast (SINGULAIR) 10 mg tablet 90 tablet 3     Sig: TAKE 1 TABLET BY MOUTH EVERY EVENING       Pulmonology:  Leukotriene Inhibitors Passed - 1/11/2022  9:02 AM        Passed - Patient is at least 18 years old        Passed - Valid encounter within last 15 months     Recent Visits  Date Type Provider Dept   10/18/21 Office Visit Leroy Alston MD [facility] Family Medicine   10/11/21 Office Visit Leroy Alston MD [facility] Family Medicine   10/01/21 Office Visit Leroy Alston MD [facility] Medicine   06/16/21 Office Visit Leroy Alston MD [facility] Internal Medicine   03/19/21 Office Visit Leroy Alston MD [facility] Internal Medicine II   09/14/20 Office Visit Leroy Alston MD [facility] Family Medicine   03/20/20 Office Visit Leroy Alston MD [facility]   Showing recent visits within past 720 days and meeting all other requirements  Future Appointments  No visits were found meeting these conditions.  Showing future appointments within next 150 days and meeting all other requirements      Future Appointments              In 4 weeks MD Nikita Morris - Orthopedics [facility], Nikita Perez    In 3 months Leroy Alston MD [place] [facility], [place]                      Appointments  past 12m or future 3m with PCP    Date Provider   Last Visit   10/18/2021 Leroy Alston MD   Next Visit   4/11/2022 Leroy Alston MD   ED visits in past 90 days: 0     Note composed:8:31 PM 01/11/2022

## 2022-02-08 ENCOUNTER — PATIENT OUTREACH (OUTPATIENT)
Dept: ORTHOPEDICS | Facility: CLINIC | Age: 77
End: 2022-02-08
Payer: MEDICARE

## 2022-02-09 ENCOUNTER — OFFICE VISIT (OUTPATIENT)
Dept: ORTHOPEDICS | Facility: CLINIC | Age: 77
End: 2022-02-09
Payer: MEDICARE

## 2022-02-09 VITALS — BODY MASS INDEX: 38.33 KG/M2 | HEIGHT: 72 IN | WEIGHT: 283 LBS

## 2022-02-09 DIAGNOSIS — Z96.652 STATUS POST TOTAL LEFT KNEE REPLACEMENT: Primary | ICD-10-CM

## 2022-02-09 PROCEDURE — 3288F FALL RISK ASSESSMENT DOCD: CPT | Mod: CPTII,S$GLB,, | Performed by: ORTHOPAEDIC SURGERY

## 2022-02-09 PROCEDURE — 99999 PR PBB SHADOW E&M-EST. PATIENT-LVL III: CPT | Mod: PBBFAC,,, | Performed by: ORTHOPAEDIC SURGERY

## 2022-02-09 PROCEDURE — 99024 POSTOP FOLLOW-UP VISIT: CPT | Mod: S$GLB,,, | Performed by: ORTHOPAEDIC SURGERY

## 2022-02-09 PROCEDURE — 1159F MED LIST DOCD IN RCRD: CPT | Mod: CPTII,S$GLB,, | Performed by: ORTHOPAEDIC SURGERY

## 2022-02-09 PROCEDURE — 1101F PT FALLS ASSESS-DOCD LE1/YR: CPT | Mod: CPTII,S$GLB,, | Performed by: ORTHOPAEDIC SURGERY

## 2022-02-09 PROCEDURE — 1125F PR PAIN SEVERITY QUANTIFIED, PAIN PRESENT: ICD-10-PCS | Mod: CPTII,S$GLB,, | Performed by: ORTHOPAEDIC SURGERY

## 2022-02-09 PROCEDURE — 1159F PR MEDICATION LIST DOCUMENTED IN MEDICAL RECORD: ICD-10-PCS | Mod: CPTII,S$GLB,, | Performed by: ORTHOPAEDIC SURGERY

## 2022-02-09 PROCEDURE — 1160F RVW MEDS BY RX/DR IN RCRD: CPT | Mod: CPTII,S$GLB,, | Performed by: ORTHOPAEDIC SURGERY

## 2022-02-09 PROCEDURE — 1101F PR PT FALLS ASSESS DOC 0-1 FALLS W/OUT INJ PAST YR: ICD-10-PCS | Mod: CPTII,S$GLB,, | Performed by: ORTHOPAEDIC SURGERY

## 2022-02-09 PROCEDURE — 3288F PR FALLS RISK ASSESSMENT DOCUMENTED: ICD-10-PCS | Mod: CPTII,S$GLB,, | Performed by: ORTHOPAEDIC SURGERY

## 2022-02-09 PROCEDURE — 99024 PR POST-OP FOLLOW-UP VISIT: ICD-10-PCS | Mod: S$GLB,,, | Performed by: ORTHOPAEDIC SURGERY

## 2022-02-09 PROCEDURE — 1160F PR REVIEW ALL MEDS BY PRESCRIBER/CLIN PHARMACIST DOCUMENTED: ICD-10-PCS | Mod: CPTII,S$GLB,, | Performed by: ORTHOPAEDIC SURGERY

## 2022-02-09 PROCEDURE — 99999 PR PBB SHADOW E&M-EST. PATIENT-LVL III: ICD-10-PCS | Mod: PBBFAC,,, | Performed by: ORTHOPAEDIC SURGERY

## 2022-02-09 PROCEDURE — 1125F AMNT PAIN NOTED PAIN PRSNT: CPT | Mod: CPTII,S$GLB,, | Performed by: ORTHOPAEDIC SURGERY

## 2022-02-09 NOTE — PROGRESS NOTES
Alejo Melvin is in for 3 month follow up for a  Left TKA.  He is doing very well. Mild pain in the knee.  He has resumed activities of daily living. He has completed PT  Exam demonstrates  A well developed male in no distress.  Alert and oriented.  Mood and affect are appropriate.    Knee incision is well healed.  ROM is 0-125.  The patella tracks well and there is no instability. The extremity is neurovascularly intact.    Xrays demonstrate a well fixed and positioned prosthesis.  PROMIS-10 Questionnaire Scores 2/8/2022 11/1/2021 8/6/2021   Global Physical Health 12 10 15   Global Mental health Score 14 10 16     KOOS Jr. Questionnaire Score 2/8/2022 11/1/2021 8/6/2021   KOOS Jr Score 4 13 10     [place] Knee Questionnaire Score 2/8/2022 11/1/2021 8/6/2021   [place] Knee Score 46 32 23         Imp:Doing well    F/u in 8 months with xrays and PROMS

## 2022-04-11 ENCOUNTER — OFFICE VISIT (OUTPATIENT)
Dept: FAMILY MEDICINE | Facility: CLINIC | Age: 77
End: 2022-04-11
Payer: MEDICARE

## 2022-04-11 VITALS
BODY MASS INDEX: 38.82 KG/M2 | HEART RATE: 82 BPM | SYSTOLIC BLOOD PRESSURE: 130 MMHG | WEIGHT: 286.63 LBS | HEIGHT: 72 IN | RESPIRATION RATE: 20 BRPM | DIASTOLIC BLOOD PRESSURE: 84 MMHG

## 2022-04-11 DIAGNOSIS — J30.1 NON-SEASONAL ALLERGIC RHINITIS DUE TO POLLEN: ICD-10-CM

## 2022-04-11 DIAGNOSIS — N52.9 ERECTILE DYSFUNCTION, UNSPECIFIED ERECTILE DYSFUNCTION TYPE: Primary | ICD-10-CM

## 2022-04-11 DIAGNOSIS — E78.2 MIXED HYPERLIPIDEMIA: ICD-10-CM

## 2022-04-11 DIAGNOSIS — M17.11 PRIMARY OSTEOARTHRITIS OF RIGHT KNEE: ICD-10-CM

## 2022-04-11 DIAGNOSIS — I10 ESSENTIAL HYPERTENSION: ICD-10-CM

## 2022-04-11 DIAGNOSIS — G47.33 OSA ON CPAP: ICD-10-CM

## 2022-04-11 DIAGNOSIS — Z23 IMMUNIZATION DUE: ICD-10-CM

## 2022-04-11 PROCEDURE — 1126F AMNT PAIN NOTED NONE PRSNT: CPT | Mod: CPTII,S$GLB,, | Performed by: FAMILY MEDICINE

## 2022-04-11 PROCEDURE — 99999 PR PBB SHADOW E&M-EST. PATIENT-LVL V: CPT | Mod: PBBFAC,,, | Performed by: FAMILY MEDICINE

## 2022-04-11 PROCEDURE — 1159F MED LIST DOCD IN RCRD: CPT | Mod: CPTII,S$GLB,, | Performed by: FAMILY MEDICINE

## 2022-04-11 PROCEDURE — 1126F PR PAIN SEVERITY QUANTIFIED, NO PAIN PRESENT: ICD-10-PCS | Mod: CPTII,S$GLB,, | Performed by: FAMILY MEDICINE

## 2022-04-11 PROCEDURE — 1101F PR PT FALLS ASSESS DOC 0-1 FALLS W/OUT INJ PAST YR: ICD-10-PCS | Mod: CPTII,S$GLB,, | Performed by: FAMILY MEDICINE

## 2022-04-11 PROCEDURE — 90670 PNEUMOCOCCAL CONJUGATE VACCINE 13-VALENT LESS THAN 5YO & GREATER THAN: ICD-10-PCS | Mod: S$GLB,,, | Performed by: FAMILY MEDICINE

## 2022-04-11 PROCEDURE — 1160F PR REVIEW ALL MEDS BY PRESCRIBER/CLIN PHARMACIST DOCUMENTED: ICD-10-PCS | Mod: CPTII,S$GLB,, | Performed by: FAMILY MEDICINE

## 2022-04-11 PROCEDURE — 3079F PR MOST RECENT DIASTOLIC BLOOD PRESSURE 80-89 MM HG: ICD-10-PCS | Mod: CPTII,S$GLB,, | Performed by: FAMILY MEDICINE

## 2022-04-11 PROCEDURE — 99214 OFFICE O/P EST MOD 30 MIN: CPT | Mod: S$GLB,,, | Performed by: FAMILY MEDICINE

## 2022-04-11 PROCEDURE — 99999 PR PBB SHADOW E&M-EST. PATIENT-LVL V: ICD-10-PCS | Mod: PBBFAC,,, | Performed by: FAMILY MEDICINE

## 2022-04-11 PROCEDURE — 90670 PCV13 VACCINE IM: CPT | Mod: S$GLB,,, | Performed by: FAMILY MEDICINE

## 2022-04-11 PROCEDURE — G0009 ADMIN PNEUMOCOCCAL VACCINE: HCPCS | Mod: S$GLB,,, | Performed by: FAMILY MEDICINE

## 2022-04-11 PROCEDURE — G0009 PNEUMOCOCCAL CONJUGATE VACCINE 13-VALENT LESS THAN 5YO & GREATER THAN: ICD-10-PCS | Mod: S$GLB,,, | Performed by: FAMILY MEDICINE

## 2022-04-11 PROCEDURE — 3079F DIAST BP 80-89 MM HG: CPT | Mod: CPTII,S$GLB,, | Performed by: FAMILY MEDICINE

## 2022-04-11 PROCEDURE — 1159F PR MEDICATION LIST DOCUMENTED IN MEDICAL RECORD: ICD-10-PCS | Mod: CPTII,S$GLB,, | Performed by: FAMILY MEDICINE

## 2022-04-11 PROCEDURE — 1160F RVW MEDS BY RX/DR IN RCRD: CPT | Mod: CPTII,S$GLB,, | Performed by: FAMILY MEDICINE

## 2022-04-11 PROCEDURE — 1101F PT FALLS ASSESS-DOCD LE1/YR: CPT | Mod: CPTII,S$GLB,, | Performed by: FAMILY MEDICINE

## 2022-04-11 PROCEDURE — 3288F PR FALLS RISK ASSESSMENT DOCUMENTED: ICD-10-PCS | Mod: CPTII,S$GLB,, | Performed by: FAMILY MEDICINE

## 2022-04-11 PROCEDURE — 3075F SYST BP GE 130 - 139MM HG: CPT | Mod: CPTII,S$GLB,, | Performed by: FAMILY MEDICINE

## 2022-04-11 PROCEDURE — 3075F PR MOST RECENT SYSTOLIC BLOOD PRESS GE 130-139MM HG: ICD-10-PCS | Mod: CPTII,S$GLB,, | Performed by: FAMILY MEDICINE

## 2022-04-11 PROCEDURE — 99214 PR OFFICE/OUTPT VISIT, EST, LEVL IV, 30-39 MIN: ICD-10-PCS | Mod: S$GLB,,, | Performed by: FAMILY MEDICINE

## 2022-04-11 PROCEDURE — 3288F FALL RISK ASSESSMENT DOCD: CPT | Mod: CPTII,S$GLB,, | Performed by: FAMILY MEDICINE

## 2022-04-11 RX ORDER — CIPROFLOXACIN 500 MG/1
500 TABLET ORAL 2 TIMES DAILY
COMMUNITY
Start: 2022-04-08 | End: 2022-05-04

## 2022-04-11 RX ORDER — POTASSIUM CHLORIDE 20 MEQ/1
20 TABLET, EXTENDED RELEASE ORAL DAILY
Qty: 30 TABLET | Refills: 5 | Status: SHIPPED | OUTPATIENT
Start: 2022-04-11 | End: 2022-08-01

## 2022-04-11 RX ORDER — LOSARTAN POTASSIUM 50 MG/1
50 TABLET ORAL DAILY
Qty: 30 TABLET | Refills: 5 | Status: SHIPPED | OUTPATIENT
Start: 2022-04-11 | End: 2022-10-10 | Stop reason: SDUPTHER

## 2022-04-11 RX ORDER — SILDENAFIL CITRATE 20 MG/1
TABLET ORAL
Qty: 30 TABLET | Refills: 4
Start: 2022-04-11 | End: 2024-01-19

## 2022-04-11 RX ORDER — AMLODIPINE BESYLATE 5 MG/1
5 TABLET ORAL DAILY
Qty: 30 TABLET | Refills: 5 | Status: SHIPPED | OUTPATIENT
Start: 2022-04-11 | End: 2022-10-10 | Stop reason: SDUPTHER

## 2022-04-11 RX ORDER — HYDROCODONE BITARTRATE AND ACETAMINOPHEN 7.5; 325 MG/1; MG/1
1 TABLET ORAL EVERY 6 HOURS PRN
COMMUNITY
Start: 2022-01-07 | End: 2022-04-11

## 2022-04-11 RX ORDER — FUROSEMIDE 40 MG/1
40 TABLET ORAL 2 TIMES DAILY
Qty: 60 TABLET | Refills: 5 | Status: SHIPPED | OUTPATIENT
Start: 2022-04-11 | End: 2022-10-10 | Stop reason: SDUPTHER
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
## 2022-04-12 ENCOUNTER — OFFICE VISIT (OUTPATIENT)
Dept: ORTHOPEDICS | Facility: CLINIC | Age: 77
End: 2022-04-12
Payer: MEDICARE

## 2022-04-12 ENCOUNTER — PATIENT OUTREACH (OUTPATIENT)
Dept: ADMINISTRATIVE | Facility: OTHER | Age: 77
End: 2022-04-12
Payer: MEDICARE

## 2022-04-12 VITALS
RESPIRATION RATE: 18 BRPM | DIASTOLIC BLOOD PRESSURE: 82 MMHG | BODY MASS INDEX: 38.28 KG/M2 | HEART RATE: 78 BPM | WEIGHT: 282.63 LBS | HEIGHT: 72 IN | SYSTOLIC BLOOD PRESSURE: 108 MMHG

## 2022-04-12 DIAGNOSIS — M17.11 PRIMARY OSTEOARTHRITIS OF RIGHT KNEE: Primary | ICD-10-CM

## 2022-04-12 PROCEDURE — 1159F PR MEDICATION LIST DOCUMENTED IN MEDICAL RECORD: ICD-10-PCS | Mod: CPTII,S$GLB,, | Performed by: PHYSICIAN ASSISTANT

## 2022-04-12 PROCEDURE — 3074F PR MOST RECENT SYSTOLIC BLOOD PRESSURE < 130 MM HG: ICD-10-PCS | Mod: CPTII,S$GLB,, | Performed by: PHYSICIAN ASSISTANT

## 2022-04-12 PROCEDURE — 1126F AMNT PAIN NOTED NONE PRSNT: CPT | Mod: CPTII,S$GLB,, | Performed by: PHYSICIAN ASSISTANT

## 2022-04-12 PROCEDURE — 99999 PR PBB SHADOW E&M-EST. PATIENT-LVL IV: CPT | Mod: PBBFAC,,, | Performed by: PHYSICIAN ASSISTANT

## 2022-04-12 PROCEDURE — 1160F PR REVIEW ALL MEDS BY PRESCRIBER/CLIN PHARMACIST DOCUMENTED: ICD-10-PCS | Mod: CPTII,S$GLB,, | Performed by: PHYSICIAN ASSISTANT

## 2022-04-12 PROCEDURE — 3079F DIAST BP 80-89 MM HG: CPT | Mod: CPTII,S$GLB,, | Performed by: PHYSICIAN ASSISTANT

## 2022-04-12 PROCEDURE — 3074F SYST BP LT 130 MM HG: CPT | Mod: CPTII,S$GLB,, | Performed by: PHYSICIAN ASSISTANT

## 2022-04-12 PROCEDURE — 99213 OFFICE O/P EST LOW 20 MIN: CPT | Mod: S$GLB,,, | Performed by: PHYSICIAN ASSISTANT

## 2022-04-12 PROCEDURE — 1101F PR PT FALLS ASSESS DOC 0-1 FALLS W/OUT INJ PAST YR: ICD-10-PCS | Mod: CPTII,S$GLB,, | Performed by: PHYSICIAN ASSISTANT

## 2022-04-12 PROCEDURE — 1126F PR PAIN SEVERITY QUANTIFIED, NO PAIN PRESENT: ICD-10-PCS | Mod: CPTII,S$GLB,, | Performed by: PHYSICIAN ASSISTANT

## 2022-04-12 PROCEDURE — 1159F MED LIST DOCD IN RCRD: CPT | Mod: CPTII,S$GLB,, | Performed by: PHYSICIAN ASSISTANT

## 2022-04-12 PROCEDURE — 1101F PT FALLS ASSESS-DOCD LE1/YR: CPT | Mod: CPTII,S$GLB,, | Performed by: PHYSICIAN ASSISTANT

## 2022-04-12 PROCEDURE — 99999 PR PBB SHADOW E&M-EST. PATIENT-LVL IV: ICD-10-PCS | Mod: PBBFAC,,, | Performed by: PHYSICIAN ASSISTANT

## 2022-04-12 PROCEDURE — 3288F PR FALLS RISK ASSESSMENT DOCUMENTED: ICD-10-PCS | Mod: CPTII,S$GLB,, | Performed by: PHYSICIAN ASSISTANT

## 2022-04-12 PROCEDURE — 1160F RVW MEDS BY RX/DR IN RCRD: CPT | Mod: CPTII,S$GLB,, | Performed by: PHYSICIAN ASSISTANT

## 2022-04-12 PROCEDURE — 3288F FALL RISK ASSESSMENT DOCD: CPT | Mod: CPTII,S$GLB,, | Performed by: PHYSICIAN ASSISTANT

## 2022-04-12 PROCEDURE — 3079F PR MOST RECENT DIASTOLIC BLOOD PRESSURE 80-89 MM HG: ICD-10-PCS | Mod: CPTII,S$GLB,, | Performed by: PHYSICIAN ASSISTANT

## 2022-04-12 PROCEDURE — 99213 PR OFFICE/OUTPT VISIT, EST, LEVL III, 20-29 MIN: ICD-10-PCS | Mod: S$GLB,,, | Performed by: PHYSICIAN ASSISTANT

## 2022-04-12 NOTE — PROGRESS NOTES
Have the patient go for a comprehensive metabolic panel, lipid panel, hemoglobin A1c and urine microalbumin level diagnosis type 2 diabetes, hyperlipidemia Health Maintenance Due   Topic Date Due    Shingles Vaccine (1 of 2) Never done    Influenza Vaccine (1) 09/01/2021     Updates were requested from care everywhere.  Chart was reviewed for overdue Proactive [facility] Encounters ([facility]) topics (CRS, Breast Cancer Screening, Eye exam)  Health Maintenance has been updated.  LINKS immunization registry triggered.  Immunizations were reconciled.

## 2022-04-12 NOTE — PROGRESS NOTES
Subjective:      Patient ID: Alejo Melvin is a 76 y.o. male.    Chief Complaint: Pain of the Right Knee    Review of patient's allergies indicates:   Allergen Reactions    No known drug allergies         75 yo M presents to clinic with c/o intermittent right knee pain x years.  Pain described as achy/stiff and located to medial knee.  Worse after prolonged sitting and standing, better with rest.  Occasional swelling to knee.  He previously completed Euflexxa series 6 months ago and has had good relief of sx until the past few weeks.  Takes tylenol as needed.  Hx of left TKA last year.  Pt states that he enjoys ballroom dancing, and knee pain is preventing him from dancing as much as he would like.  He is requesting to repeat Euflexxa at this time, says that he may consider right knee replacement in the future but is going to a week long dance camp in 2 months and would like to wait until after camp to consider surgery.                Review of Systems   Constitutional: Negative for chills, diaphoresis and fever.   HENT: Negative for congestion, ear discharge and ear pain.    Eyes: Negative for blurred vision, discharge, double vision and pain.   Cardiovascular: Negative for chest pain, claudication and cyanosis.   Respiratory: Negative for cough, hemoptysis and shortness of breath.    Endocrine: Negative for cold intolerance and heat intolerance.   Skin: Negative for color change, dry skin, itching and rash.   Musculoskeletal: Negative for arthritis, back pain, falls, gout, joint pain, joint swelling, muscle weakness and neck pain.   Gastrointestinal: Negative for abdominal pain and change in bowel habit.   Neurological: Negative for brief paralysis, disturbances in coordination and dizziness.   Psychiatric/Behavioral: Negative for altered mental status and depression.         Objective:          General    Constitutional: He is oriented to person, place, and time. He appears well-developed and  well-nourished. No distress.   HENT:   Head: Atraumatic.   Eyes: EOM are normal. Right eye exhibits no discharge. Left eye exhibits no discharge.   Cardiovascular: Normal rate.    Pulmonary/Chest: Effort normal. No respiratory distress.   Abdominal: Soft.   Neurological: He is alert and oriented to person, place, and time.   Psychiatric: He has a normal mood and affect. His behavior is normal.           Right Knee Exam     Inspection   Erythema: absent  Scars: absent  Swelling: absent  Effusion: absent  Deformity: present (varus)  Bruising: absent    Tenderness   The patient is tender to palpation of the medial joint line.    Range of Motion   Extension: 0   Flexion: 130     Tests   Ligament Examination   MCL - Valgus: normal (0 to 2mm)  LCL - Varus: normal    Other   Sensation: normal    Left Knee Exam     Inspection   Erythema: absent  Scars: present  Swelling: absent  Effusion: absent  Deformity: absent  Bruising: absent    Tenderness   The patient is experiencing no tenderness.     Range of Motion   Extension: 0   Left knee flexion: 125.     Tests   Stability   MCL - Valgus: normal (0 to 2mm)  LCL - Varus: normal (0 to 2mm)    Other   Sensation: normal    Vascular Exam     Right Pulses  Dorsalis Pedis:      2+          Left Pulses  Dorsalis Pedis:      2+          Edema  Right Lower Leg: absent  Left Lower Leg: absent              Assessment:         Xray Left Knee 12/22/21:  Left knee: There is a TKA in place good alignment no complication.     Right knee: There is moderate DJD and a varus deformity.  No fracture dislocation bone destruction or OCD seen.  Kellgren Gilmer 3.      Encounter Diagnosis   Name Primary?    Primary osteoarthritis of right knee Yes    Primary osteoarthritis of right knee  -     sodium hyaluronate (EUFLEXXA) 10 mg/mL(mw 2.4 -3.6 million) injection 20 mg  -     Prior authorization Order               Plan:         The total face-to-face encounter time with this patient was 30 min and  greater than 50% of of the encounter time was spent counseling the patient, coordinating care, and education regarding the pathology and natural history of his diagnosis. We have discussed a variety of treatment options including medications, injections, physical therapy and other alternative treatments. I also explained the indications, risks and benefits of surgery. Pt would like to proceed with visco-supplementation at this time.    I made the decision to obtain old records of the patient including previous notes and imaging. New imaging was ordered today of the extremity or extremities evaluated. I independently reviewed and interpreted the radiographs and/or MRIs today as well as prior imaging.      1. Pre-authorization placed for Euflexxa series injections.  2. Ice compress to the affected area 2-3x a day for 15-20 minutes as needed for pain management.  3. Continue tylenol as directed as needed.  4. RTC in 1 week for Euflexxa series injections #1 of 3, sooner if needed.    Patient voices understanding of and agreement with treatment plan. All of the patient's questions were answered and the patient will contact us if he has any questions or concerns in the interim.

## 2022-04-20 ENCOUNTER — OFFICE VISIT (OUTPATIENT)
Dept: ORTHOPEDICS | Facility: CLINIC | Age: 77
End: 2022-04-20
Payer: MEDICARE

## 2022-04-20 VITALS
WEIGHT: 284.19 LBS | BODY MASS INDEX: 38.49 KG/M2 | SYSTOLIC BLOOD PRESSURE: 136 MMHG | RESPIRATION RATE: 18 BRPM | HEART RATE: 77 BPM | HEIGHT: 72 IN | DIASTOLIC BLOOD PRESSURE: 72 MMHG

## 2022-04-20 DIAGNOSIS — M17.11 PRIMARY OSTEOARTHRITIS OF RIGHT KNEE: Primary | ICD-10-CM

## 2022-04-20 PROCEDURE — 99499 NO LOS: ICD-10-PCS | Mod: S$GLB,,, | Performed by: PHYSICIAN ASSISTANT

## 2022-04-20 PROCEDURE — 1101F PR PT FALLS ASSESS DOC 0-1 FALLS W/OUT INJ PAST YR: ICD-10-PCS | Mod: CPTII,S$GLB,, | Performed by: PHYSICIAN ASSISTANT

## 2022-04-20 PROCEDURE — 99499 UNLISTED E&M SERVICE: CPT | Mod: S$GLB,,, | Performed by: PHYSICIAN ASSISTANT

## 2022-04-20 PROCEDURE — 1160F RVW MEDS BY RX/DR IN RCRD: CPT | Mod: CPTII,S$GLB,, | Performed by: PHYSICIAN ASSISTANT

## 2022-04-20 PROCEDURE — 3075F PR MOST RECENT SYSTOLIC BLOOD PRESS GE 130-139MM HG: ICD-10-PCS | Mod: CPTII,S$GLB,, | Performed by: PHYSICIAN ASSISTANT

## 2022-04-20 PROCEDURE — 3078F DIAST BP <80 MM HG: CPT | Mod: CPTII,S$GLB,, | Performed by: PHYSICIAN ASSISTANT

## 2022-04-20 PROCEDURE — 1125F AMNT PAIN NOTED PAIN PRSNT: CPT | Mod: CPTII,S$GLB,, | Performed by: PHYSICIAN ASSISTANT

## 2022-04-20 PROCEDURE — 3288F PR FALLS RISK ASSESSMENT DOCUMENTED: ICD-10-PCS | Mod: CPTII,S$GLB,, | Performed by: PHYSICIAN ASSISTANT

## 2022-04-20 PROCEDURE — 3075F SYST BP GE 130 - 139MM HG: CPT | Mod: CPTII,S$GLB,, | Performed by: PHYSICIAN ASSISTANT

## 2022-04-20 PROCEDURE — 1125F PR PAIN SEVERITY QUANTIFIED, PAIN PRESENT: ICD-10-PCS | Mod: CPTII,S$GLB,, | Performed by: PHYSICIAN ASSISTANT

## 2022-04-20 PROCEDURE — 1101F PT FALLS ASSESS-DOCD LE1/YR: CPT | Mod: CPTII,S$GLB,, | Performed by: PHYSICIAN ASSISTANT

## 2022-04-20 PROCEDURE — 99999 PR PBB SHADOW E&M-EST. PATIENT-LVL III: CPT | Mod: PBBFAC,,, | Performed by: PHYSICIAN ASSISTANT

## 2022-04-20 PROCEDURE — 20610 DRAIN/INJ JOINT/BURSA W/O US: CPT | Mod: RT,S$GLB,, | Performed by: PHYSICIAN ASSISTANT

## 2022-04-20 PROCEDURE — 3288F FALL RISK ASSESSMENT DOCD: CPT | Mod: CPTII,S$GLB,, | Performed by: PHYSICIAN ASSISTANT

## 2022-04-20 PROCEDURE — 1160F PR REVIEW ALL MEDS BY PRESCRIBER/CLIN PHARMACIST DOCUMENTED: ICD-10-PCS | Mod: CPTII,S$GLB,, | Performed by: PHYSICIAN ASSISTANT

## 2022-04-20 PROCEDURE — 99999 PR PBB SHADOW E&M-EST. PATIENT-LVL III: ICD-10-PCS | Mod: PBBFAC,,, | Performed by: PHYSICIAN ASSISTANT

## 2022-04-20 PROCEDURE — 1159F MED LIST DOCD IN RCRD: CPT | Mod: CPTII,S$GLB,, | Performed by: PHYSICIAN ASSISTANT

## 2022-04-20 PROCEDURE — 3078F PR MOST RECENT DIASTOLIC BLOOD PRESSURE < 80 MM HG: ICD-10-PCS | Mod: CPTII,S$GLB,, | Performed by: PHYSICIAN ASSISTANT

## 2022-04-20 PROCEDURE — 20610 PR DRAIN/INJECT LARGE JOINT/BURSA: ICD-10-PCS | Mod: RT,S$GLB,, | Performed by: PHYSICIAN ASSISTANT

## 2022-04-20 PROCEDURE — 1159F PR MEDICATION LIST DOCUMENTED IN MEDICAL RECORD: ICD-10-PCS | Mod: CPTII,S$GLB,, | Performed by: PHYSICIAN ASSISTANT

## 2022-04-20 NOTE — PROGRESS NOTES
Alejo Melvin is here for follow up of right knee arthritis. Pt is requesting Euflexxa series injections #1 of 3.  PMHx reviewed per encounter record. Has failed other conservative modalities including NSAIDS, activity modification, weight loss.      PHYSICAL EXAMINATION:     General: The patient is alert and oriented x 3. Mood is pleasant.   Observation of ears, eyes and nose reveals no gross abnormalities. No   labored breathing observed.     No signs of infection or adverse reaction to knee.    Injection Procedure  A time out was performed, including verification of patient ID, procedure, site and side, availability of information and equipment, review of safety issues, and agreement with consent, the procedure site was marked.    After time out was performed, the patient was prepped aseptically with chloraprep. A diagnostic and therapeutic injection of 2cc Euflexxa was given under sterile technique using a 22g x 1.5 needle from the Anterolateral  aspect of the right Knee Joint in the seated position.      Alejo Melvin had no adverse reactions to the medication. He was instructed to apply ice to the joint for 20 minutes and avoid strenuous activities for 24-36 hours following the injection. He was warned of possible blood sugar and/or blood pressure changes during that time. Following that time, he can resume regular activities.    He was reminded to call the clinic immediately for any adverse side effects as explained in clinic today.    RTC in 1 week for Euflexxa series injections #2 of 3, sooner if needed.    Patient voices understanding of and agreement with treatment plan. All of the patient's questions were answered and the patient will contact us if they have any questions or concerns in the interim.

## 2022-04-22 ENCOUNTER — PATIENT MESSAGE (OUTPATIENT)
Dept: FAMILY MEDICINE | Facility: CLINIC | Age: 77
End: 2022-04-22
Payer: MEDICARE

## 2022-04-27 ENCOUNTER — OFFICE VISIT (OUTPATIENT)
Dept: ORTHOPEDICS | Facility: CLINIC | Age: 77
End: 2022-04-27
Payer: MEDICARE

## 2022-04-27 VITALS
WEIGHT: 284.19 LBS | HEART RATE: 80 BPM | RESPIRATION RATE: 18 BRPM | BODY MASS INDEX: 38.49 KG/M2 | DIASTOLIC BLOOD PRESSURE: 80 MMHG | SYSTOLIC BLOOD PRESSURE: 128 MMHG | HEIGHT: 72 IN

## 2022-04-27 DIAGNOSIS — M17.11 PRIMARY OSTEOARTHRITIS OF RIGHT KNEE: Primary | ICD-10-CM

## 2022-04-27 PROCEDURE — 3288F PR FALLS RISK ASSESSMENT DOCUMENTED: ICD-10-PCS | Mod: CPTII,S$GLB,, | Performed by: PHYSICIAN ASSISTANT

## 2022-04-27 PROCEDURE — 3079F DIAST BP 80-89 MM HG: CPT | Mod: CPTII,S$GLB,, | Performed by: PHYSICIAN ASSISTANT

## 2022-04-27 PROCEDURE — 99499 NO LOS: ICD-10-PCS | Mod: S$GLB,,, | Performed by: PHYSICIAN ASSISTANT

## 2022-04-27 PROCEDURE — 99499 UNLISTED E&M SERVICE: CPT | Mod: S$GLB,,, | Performed by: PHYSICIAN ASSISTANT

## 2022-04-27 PROCEDURE — 1125F PR PAIN SEVERITY QUANTIFIED, PAIN PRESENT: ICD-10-PCS | Mod: CPTII,S$GLB,, | Performed by: PHYSICIAN ASSISTANT

## 2022-04-27 PROCEDURE — 99999 PR PBB SHADOW E&M-EST. PATIENT-LVL IV: ICD-10-PCS | Mod: PBBFAC,,, | Performed by: PHYSICIAN ASSISTANT

## 2022-04-27 PROCEDURE — 1125F AMNT PAIN NOTED PAIN PRSNT: CPT | Mod: CPTII,S$GLB,, | Performed by: PHYSICIAN ASSISTANT

## 2022-04-27 PROCEDURE — 3074F PR MOST RECENT SYSTOLIC BLOOD PRESSURE < 130 MM HG: ICD-10-PCS | Mod: CPTII,S$GLB,, | Performed by: PHYSICIAN ASSISTANT

## 2022-04-27 PROCEDURE — 99999 PR PBB SHADOW E&M-EST. PATIENT-LVL IV: CPT | Mod: PBBFAC,,, | Performed by: PHYSICIAN ASSISTANT

## 2022-04-27 PROCEDURE — 1159F MED LIST DOCD IN RCRD: CPT | Mod: CPTII,S$GLB,, | Performed by: PHYSICIAN ASSISTANT

## 2022-04-27 PROCEDURE — 1160F RVW MEDS BY RX/DR IN RCRD: CPT | Mod: CPTII,S$GLB,, | Performed by: PHYSICIAN ASSISTANT

## 2022-04-27 PROCEDURE — 1159F PR MEDICATION LIST DOCUMENTED IN MEDICAL RECORD: ICD-10-PCS | Mod: CPTII,S$GLB,, | Performed by: PHYSICIAN ASSISTANT

## 2022-04-27 PROCEDURE — 20610 DRAIN/INJ JOINT/BURSA W/O US: CPT | Mod: RT,S$GLB,, | Performed by: PHYSICIAN ASSISTANT

## 2022-04-27 PROCEDURE — 3079F PR MOST RECENT DIASTOLIC BLOOD PRESSURE 80-89 MM HG: ICD-10-PCS | Mod: CPTII,S$GLB,, | Performed by: PHYSICIAN ASSISTANT

## 2022-04-27 PROCEDURE — 3074F SYST BP LT 130 MM HG: CPT | Mod: CPTII,S$GLB,, | Performed by: PHYSICIAN ASSISTANT

## 2022-04-27 PROCEDURE — 3288F FALL RISK ASSESSMENT DOCD: CPT | Mod: CPTII,S$GLB,, | Performed by: PHYSICIAN ASSISTANT

## 2022-04-27 PROCEDURE — 1160F PR REVIEW ALL MEDS BY PRESCRIBER/CLIN PHARMACIST DOCUMENTED: ICD-10-PCS | Mod: CPTII,S$GLB,, | Performed by: PHYSICIAN ASSISTANT

## 2022-04-27 PROCEDURE — 1101F PR PT FALLS ASSESS DOC 0-1 FALLS W/OUT INJ PAST YR: ICD-10-PCS | Mod: CPTII,S$GLB,, | Performed by: PHYSICIAN ASSISTANT

## 2022-04-27 PROCEDURE — 20610 PR DRAIN/INJECT LARGE JOINT/BURSA: ICD-10-PCS | Mod: RT,S$GLB,, | Performed by: PHYSICIAN ASSISTANT

## 2022-04-27 PROCEDURE — 1101F PT FALLS ASSESS-DOCD LE1/YR: CPT | Mod: CPTII,S$GLB,, | Performed by: PHYSICIAN ASSISTANT

## 2022-04-27 NOTE — PROGRESS NOTES
Alejo Melvin is here for follow up of right knee arthritis. Pt is requesting Euflexxa series injections #2 of 3.  PMHx reviewed per encounter record. Has failed other conservative modalities including NSAIDS, activity modification, weight loss.    The prior injection was tolerated well.    PHYSICAL EXAMINATION:     General: The patient is alert and oriented x 3. Mood is pleasant.   Observation of ears, eyes and nose reveals no gross abnormalities. No   labored breathing observed.     No signs of infection or adverse reaction to knee.    Injection Procedure  A time out was performed, including verification of patient ID, procedure, site and side, availability of information and equipment, review of safety issues, and agreement with consent, the procedure site was marked.    After time out was performed, the patient was prepped aseptically with chloraprep. A diagnostic and therapeutic injection of 2cc Euflexxa was given under sterile technique using a 22g x 1.5 needle from the Anterolateral  aspect of the right Knee Joint in the seated position.      Alejo Melvin had no adverse reactions to the medication. He was instructed to apply ice to the joint for 20 minutes and avoid strenuous activities for 24-36 hours following the injection. He was warned of possible blood sugar and/or blood pressure changes during that time. Following that time, he can resume regular activities.    He was reminded to call the clinic immediately for any adverse side effects as explained in clinic today.    RTC in 1 week for Euflexxa series injections #3 of 3, sooner if needed.    Patient voices understanding of and agreement with treatment plan. All of the patient's questions were answered and the patient will contact us if they have any questions or concerns in the interim.

## 2022-04-29 ENCOUNTER — TELEPHONE (OUTPATIENT)
Dept: INTERNAL MEDICINE | Facility: CLINIC | Age: 77
End: 2022-04-29
Payer: MEDICARE

## 2022-04-29 NOTE — TELEPHONE ENCOUNTER
Patient's PSA 7.98    PER DR MOHAN:  Elevated PSA- needs to see urologist  Pt sees Dr Guzmán- pt's labs faxed

## 2022-05-04 ENCOUNTER — OFFICE VISIT (OUTPATIENT)
Dept: ORTHOPEDICS | Facility: CLINIC | Age: 77
End: 2022-05-04
Payer: MEDICARE

## 2022-05-04 VITALS
DIASTOLIC BLOOD PRESSURE: 92 MMHG | WEIGHT: 280.63 LBS | SYSTOLIC BLOOD PRESSURE: 140 MMHG | HEIGHT: 72 IN | RESPIRATION RATE: 18 BRPM | HEART RATE: 71 BPM | BODY MASS INDEX: 38.01 KG/M2

## 2022-05-04 DIAGNOSIS — M17.11 PRIMARY OSTEOARTHRITIS OF RIGHT KNEE: Primary | ICD-10-CM

## 2022-05-04 PROCEDURE — 3080F PR MOST RECENT DIASTOLIC BLOOD PRESSURE >= 90 MM HG: ICD-10-PCS | Mod: CPTII,S$GLB,, | Performed by: PHYSICIAN ASSISTANT

## 2022-05-04 PROCEDURE — 99999 PR PBB SHADOW E&M-EST. PATIENT-LVL IV: CPT | Mod: PBBFAC,,, | Performed by: PHYSICIAN ASSISTANT

## 2022-05-04 PROCEDURE — 1159F PR MEDICATION LIST DOCUMENTED IN MEDICAL RECORD: ICD-10-PCS | Mod: CPTII,S$GLB,, | Performed by: PHYSICIAN ASSISTANT

## 2022-05-04 PROCEDURE — 3080F DIAST BP >= 90 MM HG: CPT | Mod: CPTII,S$GLB,, | Performed by: PHYSICIAN ASSISTANT

## 2022-05-04 PROCEDURE — 99999 PR PBB SHADOW E&M-EST. PATIENT-LVL IV: ICD-10-PCS | Mod: PBBFAC,,, | Performed by: PHYSICIAN ASSISTANT

## 2022-05-04 PROCEDURE — 20610 PR DRAIN/INJECT LARGE JOINT/BURSA: ICD-10-PCS | Mod: RT,S$GLB,, | Performed by: PHYSICIAN ASSISTANT

## 2022-05-04 PROCEDURE — 1159F MED LIST DOCD IN RCRD: CPT | Mod: CPTII,S$GLB,, | Performed by: PHYSICIAN ASSISTANT

## 2022-05-04 PROCEDURE — 1125F PR PAIN SEVERITY QUANTIFIED, PAIN PRESENT: ICD-10-PCS | Mod: CPTII,S$GLB,, | Performed by: PHYSICIAN ASSISTANT

## 2022-05-04 PROCEDURE — 20610 DRAIN/INJ JOINT/BURSA W/O US: CPT | Mod: RT,S$GLB,, | Performed by: PHYSICIAN ASSISTANT

## 2022-05-04 PROCEDURE — 99499 NO LOS: ICD-10-PCS | Mod: S$GLB,,, | Performed by: PHYSICIAN ASSISTANT

## 2022-05-04 PROCEDURE — 1160F RVW MEDS BY RX/DR IN RCRD: CPT | Mod: CPTII,S$GLB,, | Performed by: PHYSICIAN ASSISTANT

## 2022-05-04 PROCEDURE — 1160F PR REVIEW ALL MEDS BY PRESCRIBER/CLIN PHARMACIST DOCUMENTED: ICD-10-PCS | Mod: CPTII,S$GLB,, | Performed by: PHYSICIAN ASSISTANT

## 2022-05-04 PROCEDURE — 3077F PR MOST RECENT SYSTOLIC BLOOD PRESSURE >= 140 MM HG: ICD-10-PCS | Mod: CPTII,S$GLB,, | Performed by: PHYSICIAN ASSISTANT

## 2022-05-04 PROCEDURE — 3077F SYST BP >= 140 MM HG: CPT | Mod: CPTII,S$GLB,, | Performed by: PHYSICIAN ASSISTANT

## 2022-05-04 PROCEDURE — 99499 UNLISTED E&M SERVICE: CPT | Mod: S$GLB,,, | Performed by: PHYSICIAN ASSISTANT

## 2022-05-04 PROCEDURE — 1125F AMNT PAIN NOTED PAIN PRSNT: CPT | Mod: CPTII,S$GLB,, | Performed by: PHYSICIAN ASSISTANT

## 2022-05-04 RX ORDER — ACETAMINOPHEN 500 MG
TABLET ORAL
COMMUNITY
Start: 2022-04-01 | End: 2024-03-26

## 2022-05-04 NOTE — PROGRESS NOTES
Alejo Melvin is here for follow up of right knee arthritis. Pt is requesting Euflexxa series injections #3 of 3.  PMHx reviewed per encounter record. Has failed other conservative modalities including NSAIDS, activity modification, weight loss.    The prior injection was tolerated well.    PHYSICAL EXAMINATION:     General: The patient is alert and oriented x 3. Mood is pleasant.   Observation of ears, eyes and nose reveals no gross abnormalities. No   labored breathing observed.     No signs of infection or adverse reaction to knee.    Injection Procedure  A time out was performed, including verification of patient ID, procedure, site and side, availability of information and equipment, review of safety issues, and agreement with consent, the procedure site was marked.    After time out was performed, the patient was prepped aseptically with chloraprep. A diagnostic and therapeutic injection of 2cc Euflexxa was given under sterile technique using a 22g x 1.5 needle from the Anterolateral  aspect of the right Knee Joint in the seated position.      Alejo Melvin had no adverse reactions to the medication. He was instructed to apply ice to the joint for 20 minutes and avoid strenuous activities for 24-36 hours following the injection. He was warned of possible blood sugar and/or blood pressure changes during that time. Following that time, he can resume regular activities.    He was reminded to call the clinic immediately for any adverse side effects as explained in clinic today.    RTC as needed for follow up.    Patient voices understanding of and agreement with treatment plan. All of the patient's questions were answered and the patient will contact us if they have any questions or concerns in the interim.

## 2022-05-27 NOTE — LETTER
----- Message from JULIETTE Horner CNP sent at 5/27/2022  2:47 PM CDT -----  Please call patient and let him know that the spot we took off of his scalp was a common wart.  If it bumps back up and recurs have him follow-up for treatment with cryotherapy freezing in the office.    May 22, 2019      Leroy Alston MD  [address]  [facility] [address]           [facility] [facility]  [address] [facility] [address]  Phone: [phone]          Patient: Alejo Melvin   MR Number: [id number]   YOB: 1945   Date of Visit: 5/22/2019       Dear Dr. Leroy Alston:    Thank you for referring Alejo Melvin to me for evaluation. Attached you will find relevant portions of my assessment and plan of care.    If you have questions, please do not hesitate to call me. I look forward to following Alejo Melvin along with you.    Sincerely,    Dalton Osuna Jr., MD    Enclosure  CC:  No Recipients    If you would like to receive this communication electronically, please contact [email or web address] or [phone] to request more information on [employer] Link access.    For providers and/or their staff who would like to refer a patient to [facility], please contact us through our one-stop-shop provider referral line, [facility], at [phone].    If you feel you have received this communication in error or would no longer like to receive these types of communications, please e-mail [email or web address]

## 2022-06-07 ENCOUNTER — TELEPHONE (OUTPATIENT)
Dept: FAMILY MEDICINE | Facility: CLINIC | Age: 77
End: 2022-06-07
Payer: MEDICARE

## 2022-06-07 NOTE — TELEPHONE ENCOUNTER
Spoke with pt, he said that Sunday night he started to feel a little bad, so Monday morning he did a home covid test, and was positive.    Informed him to treat symptoms, take zinc, vitamin c&d, drink fluids, plenty of rest. If starts feeling worse, call and a message can be sent to Dr Harper for infusion    Pt voiced verbal understanding

## 2022-06-07 NOTE — TELEPHONE ENCOUNTER
----- Message from Vivian Cazares sent at 6/7/2022  9:34 AM CDT -----  Contact: self  Pt called stating he tested positive for Covid with a home test and was asking what he can do or take to help with symptoms. He knows he needs to isolate himself but asking about meds.       Phone:  [phone]

## 2022-07-15 ENCOUNTER — PATIENT MESSAGE (OUTPATIENT)
Dept: ADMINISTRATIVE | Facility: OTHER | Age: 77
End: 2022-07-15
Payer: MEDICARE

## 2022-08-01 DIAGNOSIS — I10 ESSENTIAL HYPERTENSION: ICD-10-CM

## 2022-08-01 RX ORDER — POTASSIUM CHLORIDE 20 MEQ/1
TABLET, EXTENDED RELEASE ORAL
Qty: 30 TABLET | Refills: 0 | Status: SHIPPED | OUTPATIENT
Start: 2022-08-01 | End: 2022-08-30

## 2022-08-01 NOTE — TELEPHONE ENCOUNTER
Care Due:                  Date            Visit Type   Department     Provider  --------------------------------------------------------------------------------                                EP -                              [facility] FAMILY Leroy Nelson  Last Visit: 04-      CARE ([facility])   [facility] -                              PRIMARY      [facility]    Leroy Nelson  Next Visit: 10-      [facility] ([facility])   MEDICINE       He[place]                                                            Last  Test          Frequency    Reason                     Performed    Due Date  --------------------------------------------------------------------------------    CMP.........  12 months..  furosemide, losartan,      03-   10-                             potassium................    [employer] Embedded Care Gaps. Reference number: [id number]. 8/01/2022   9:29:29 AM CDT

## 2022-08-02 NOTE — TELEPHONE ENCOUNTER
Refill Decision Note   Alejo Melvin  is requesting a refill authorization.  Brief Assessment and Rationale for Refill:  Approve    -Medication-Related Problems Identified: Requires labs  Medication Therapy Plan:       Medication Reconciliation Completed: No   Comments:     Provider Staff:     Action is required for this patient.   Please see care gap opportunities below in Care Due Message.     Thanks!  [facility]     Appointments      Date Provider   Last Visit   4/11/2022 Leroy Alston MD   Next Visit   10/10/2022 Leroy Alston MD     Note composed:9:54 PM 08/01/2022           Note composed:9:54 PM 08/01/2022

## 2022-08-09 ENCOUNTER — PATIENT MESSAGE (OUTPATIENT)
Dept: ADMINISTRATIVE | Facility: OTHER | Age: 77
End: 2022-08-09
Payer: MEDICARE

## 2022-08-19 ENCOUNTER — TELEPHONE (OUTPATIENT)
Dept: ADMINISTRATIVE | Facility: CLINIC | Age: 77
End: 2022-08-19
Payer: MEDICARE

## 2022-08-19 NOTE — TELEPHONE ENCOUNTER
Called pt, informed pt I was calling to remind pt of his in office EAWV on 8/22/22; clinic location provided to patient; pt confirmed appointment

## 2022-08-22 ENCOUNTER — OFFICE VISIT (OUTPATIENT)
Dept: INTERNAL MEDICINE | Facility: CLINIC | Age: 77
End: 2022-08-22
Payer: MEDICARE

## 2022-08-22 VITALS
BODY MASS INDEX: 36.64 KG/M2 | HEART RATE: 88 BPM | TEMPERATURE: 98 F | HEIGHT: 72 IN | OXYGEN SATURATION: 98 % | DIASTOLIC BLOOD PRESSURE: 82 MMHG | WEIGHT: 270.5 LBS | SYSTOLIC BLOOD PRESSURE: 118 MMHG

## 2022-08-22 DIAGNOSIS — R97.20 ELEVATED PSA, LESS THAN 10 NG/ML: ICD-10-CM

## 2022-08-22 DIAGNOSIS — K57.90 DIVERTICULOSIS: ICD-10-CM

## 2022-08-22 DIAGNOSIS — G62.9 POLYNEUROPATHY: ICD-10-CM

## 2022-08-22 DIAGNOSIS — Z95.0 PACEMAKER: ICD-10-CM

## 2022-08-22 DIAGNOSIS — J30.1 NON-SEASONAL ALLERGIC RHINITIS DUE TO POLLEN: ICD-10-CM

## 2022-08-22 DIAGNOSIS — Z00.00 ENCOUNTER FOR PREVENTIVE HEALTH EXAMINATION: Primary | ICD-10-CM

## 2022-08-22 DIAGNOSIS — I10 PRIMARY HYPERTENSION: ICD-10-CM

## 2022-08-22 DIAGNOSIS — C61 PROSTATE CANCER: ICD-10-CM

## 2022-08-22 DIAGNOSIS — G47.33 OSA ON CPAP: ICD-10-CM

## 2022-08-22 DIAGNOSIS — Z00.00 ENCOUNTER FOR MEDICARE ANNUAL WELLNESS EXAM: ICD-10-CM

## 2022-08-22 DIAGNOSIS — E78.2 MIXED HYPERLIPIDEMIA: ICD-10-CM

## 2022-08-22 DIAGNOSIS — E66.01 CLASS 2 SEVERE OBESITY DUE TO EXCESS CALORIES WITH SERIOUS COMORBIDITY AND BODY MASS INDEX (BMI) OF 36.0 TO 36.9 IN ADULT: ICD-10-CM

## 2022-08-22 DIAGNOSIS — K63.5 POLYP OF COLON, UNSPECIFIED PART OF COLON, UNSPECIFIED TYPE: ICD-10-CM

## 2022-08-22 PROBLEM — E66.812 CLASS 2 SEVERE OBESITY DUE TO EXCESS CALORIES WITH SERIOUS COMORBIDITY AND BODY MASS INDEX (BMI) OF 36.0 TO 36.9 IN ADULT: Status: ACTIVE | Noted: 2021-11-01

## 2022-08-22 PROCEDURE — G0439 PR MEDICARE ANNUAL WELLNESS SUBSEQUENT VISIT: ICD-10-PCS | Mod: S$GLB,,, | Performed by: PHYSICIAN ASSISTANT

## 2022-08-22 PROCEDURE — 3288F FALL RISK ASSESSMENT DOCD: CPT | Mod: CPTII,S$GLB,, | Performed by: PHYSICIAN ASSISTANT

## 2022-08-22 PROCEDURE — 1101F PR PT FALLS ASSESS DOC 0-1 FALLS W/OUT INJ PAST YR: ICD-10-PCS | Mod: CPTII,S$GLB,, | Performed by: PHYSICIAN ASSISTANT

## 2022-08-22 PROCEDURE — 1159F PR MEDICATION LIST DOCUMENTED IN MEDICAL RECORD: ICD-10-PCS | Mod: CPTII,S$GLB,, | Performed by: PHYSICIAN ASSISTANT

## 2022-08-22 PROCEDURE — 1170F PR FUNCTIONAL STATUS ASSESSED: ICD-10-PCS | Mod: CPTII,S$GLB,, | Performed by: PHYSICIAN ASSISTANT

## 2022-08-22 PROCEDURE — 1160F PR REVIEW ALL MEDS BY PRESCRIBER/CLIN PHARMACIST DOCUMENTED: ICD-10-PCS | Mod: CPTII,S$GLB,, | Performed by: PHYSICIAN ASSISTANT

## 2022-08-22 PROCEDURE — 3074F PR MOST RECENT SYSTOLIC BLOOD PRESSURE < 130 MM HG: ICD-10-PCS | Mod: CPTII,S$GLB,, | Performed by: PHYSICIAN ASSISTANT

## 2022-08-22 PROCEDURE — 1101F PT FALLS ASSESS-DOCD LE1/YR: CPT | Mod: CPTII,S$GLB,, | Performed by: PHYSICIAN ASSISTANT

## 2022-08-22 PROCEDURE — 3288F PR FALLS RISK ASSESSMENT DOCUMENTED: ICD-10-PCS | Mod: CPTII,S$GLB,, | Performed by: PHYSICIAN ASSISTANT

## 2022-08-22 PROCEDURE — 3079F DIAST BP 80-89 MM HG: CPT | Mod: CPTII,S$GLB,, | Performed by: PHYSICIAN ASSISTANT

## 2022-08-22 PROCEDURE — 1160F RVW MEDS BY RX/DR IN RCRD: CPT | Mod: CPTII,S$GLB,, | Performed by: PHYSICIAN ASSISTANT

## 2022-08-22 PROCEDURE — G0439 PPPS, SUBSEQ VISIT: HCPCS | Mod: S$GLB,,, | Performed by: PHYSICIAN ASSISTANT

## 2022-08-22 PROCEDURE — 1159F MED LIST DOCD IN RCRD: CPT | Mod: CPTII,S$GLB,, | Performed by: PHYSICIAN ASSISTANT

## 2022-08-22 PROCEDURE — 3079F PR MOST RECENT DIASTOLIC BLOOD PRESSURE 80-89 MM HG: ICD-10-PCS | Mod: CPTII,S$GLB,, | Performed by: PHYSICIAN ASSISTANT

## 2022-08-22 PROCEDURE — 99999 PR PBB SHADOW E&M-EST. PATIENT-LVL IV: CPT | Mod: PBBFAC,,, | Performed by: PHYSICIAN ASSISTANT

## 2022-08-22 PROCEDURE — 99999 PR PBB SHADOW E&M-EST. PATIENT-LVL IV: ICD-10-PCS | Mod: PBBFAC,,, | Performed by: PHYSICIAN ASSISTANT

## 2022-08-22 PROCEDURE — 1170F FXNL STATUS ASSESSED: CPT | Mod: CPTII,S$GLB,, | Performed by: PHYSICIAN ASSISTANT

## 2022-08-22 PROCEDURE — 3074F SYST BP LT 130 MM HG: CPT | Mod: CPTII,S$GLB,, | Performed by: PHYSICIAN ASSISTANT

## 2022-08-22 NOTE — PATIENT INSTRUCTIONS
Counseling and Referral of Other Preventative  (Italic type indicates deductible and co-insurance are waived)    Patient Name: Alejo Melvin  Today's Date: 8/22/2022    Health Maintenance       Date Due Completion Date    Shingles Vaccine (1 of 2) Never done ---    COVID-19 Vaccine (4 - Booster for Pfizer series) 04/22/2022 12/22/2021    Lipid Panel 08/18/2022 8/18/2021    Influenza Vaccine (1) 09/01/2022 11/28/2020    Pneumococcal Vaccines (Age 65+) (2 - PPSV23 or PCV20) 04/11/2023 4/11/2022    TETANUS VACCINE 04/07/2027 4/7/2017 (Declined)    Override on 4/7/2017: Declined        No orders of the defined types were placed in this encounter.    The following information is provided to all patients.  This information is to help you find resources for any of the problems found today that may be affecting your health:                Living healthy guide: www.[facility].[place].[facility]      Understanding Diabetes: www.diabetes.org      Eating healthy: www.cdc.gov/healthyweight      [facility] home safety checklist: www.cdc.gov/steadi/patient.html      Agency on Aging: www.goea.[place].[facility]      Alcoholics anonymous (AA): www.aa.org      Physical Activity: www.ar.nih.gov/bn5clbp      Tobacco use: www.quitwithusla.org

## 2022-08-22 NOTE — PROGRESS NOTES
Alejo Melvin presented for a  Medicare AWV and comprehensive Health Risk Assessment today. The following components were reviewed and updated:    · Medical history  · Family History  · Social history  · Allergies and Current Medications  · Health Risk Assessment  · Health Maintenance  · Care Team         ** See Completed Assessments for Annual Wellness Visit within the encounter summary.**         The following assessments were completed:  · Living Situation  · CAGE  · Depression Screening  · Timed Get Up and Go  · Whisper Test  · Cognitive Function Screening  · Nutrition Screening  · ADL Screening  · PAQ Screening        Vitals:    08/22/22 1350   BP: 118/82   BP Location: Right arm   Patient Position: Sitting   BP Method: X-Large (Manual)   Pulse: 88   Temp: 97.6 °F (36.4 °C)   TempSrc: Tympanic   SpO2: 98%   Weight: 122.7 kg (270 lb 8.1 oz)   Height: 6' (1.829 m)     Body mass index is 36.69 kg/m².  Physical Exam  Vitals and nursing note reviewed.   Constitutional:       General: He is not in acute distress.     Appearance: He is well-developed. He is not diaphoretic.   HENT:      Head: Normocephalic and atraumatic.   Cardiovascular:      Rate and Rhythm: Normal rate and regular rhythm.      Heart sounds: Normal heart sounds. No murmur heard.    No friction rub. No gallop.   Pulmonary:      Effort: Pulmonary effort is normal. No respiratory distress.      Breath sounds: Normal breath sounds. No wheezing or rales.   Skin:     General: Skin is warm.   Neurological:      Mental Status: He is alert and oriented to person, place, and time.   Psychiatric:         Behavior: Behavior normal.         Thought Content: Thought content normal.         Judgment: Judgment normal.                   Diagnoses and health risks identified today and associated recommendations/orders:    1. Encounter for preventive health examination  -completed today  -scheduled for annual exam with labs in October.   -shingles vaccine at  pharmacy  -covid booster    2. Encounter for Medicare annual wellness exam  - Ambulatory Referral/Consult to Enhanced Annual Wellness Visit (eAWV)    3. Polyneuropathy  -Stable and controlled. Continue current treatment plan as previously prescribed with your PCP.     4. Non-seasonal allergic rhinitis due to pollen  -Stable and controlled on singulair. Continue current treatment plan as previously prescribed with your PCP.     5. Primary hypertension  -Stable and controlled on losartan and amlodipine. Continue current treatment plan as previously prescribed with your PCP.     6. Mixed hyperlipidemia  -Stable and controlled on asa 81mg BID and crestor. Continue current treatment plan as previously prescribed with your PCP.     7. Pacemaker  -Stable and controlled. Continue current treatment plan as previously prescribed with your cardiologist, Dr. Darío Garcia.    8. Elevated PSA, less than 10 ng/ml  -currently getting leuprolide injection (x2)  -DR. Guzmán    9. Class 2 severe obesity due to excess calories with serious comorbidity and body mass index (BMI) of 36.0 to 36.9 in adult  -Stable and controlled. Continue current treatment plan as previously prescribed with your PCP.       10. Polyp of colon, unspecified part of colon, unspecified type  -colonoscopy up to date.     11. MARYLU on CPAP  -Stable and controlled on CPAP. Continue current treatment plan as previously prescribed with your PCP.   -polysomnogram 3/2017    12. Diverticulosis  -Stable and controlled. No acute flare ups. Continue current treatment plan as previously prescribed with your PCP.     13. Prostate cancer  -currently getting leuprolide injection (x2)  -DR. Guzmán      Provided Alejo with a 5-10 year written screening schedule and personal prevention plan. Recommendations were developed using the USPSTF age appropriate recommendations. Education, counseling, and referrals were provided as needed. After Visit Summary printed and given to  patient which includes a list of additional screenings\tests needed.    No follow-ups on file.    Nell Sinclair PA-C  I offered to discuss advanced care planning, including how to pick a person who would make decisions for you if you were unable to make them for yourself, called a health care power of , and what kind of decisions you might make such as use of life sustaining treatments such as ventilators and tube feeding when faced with a life limiting illness recorded on a living will that they will need to know. (How you want to be cared for as you near the end of your natural life)     X Patient is interested in learning more about how to make advanced directives.  I provided them paperwork and offered to discuss this with them.

## 2022-10-10 ENCOUNTER — OFFICE VISIT (OUTPATIENT)
Dept: FAMILY MEDICINE | Facility: CLINIC | Age: 77
End: 2022-10-10
Payer: MEDICARE

## 2022-10-10 ENCOUNTER — CLINICAL SUPPORT (OUTPATIENT)
Dept: FAMILY MEDICINE | Facility: CLINIC | Age: 77
End: 2022-10-10
Payer: MEDICARE

## 2022-10-10 ENCOUNTER — IMMUNIZATION (OUTPATIENT)
Dept: FAMILY MEDICINE | Facility: CLINIC | Age: 77
End: 2022-10-10
Payer: MEDICARE

## 2022-10-10 VITALS
HEART RATE: 60 BPM | DIASTOLIC BLOOD PRESSURE: 78 MMHG | BODY MASS INDEX: 36.17 KG/M2 | HEIGHT: 72 IN | WEIGHT: 267.06 LBS | RESPIRATION RATE: 18 BRPM | SYSTOLIC BLOOD PRESSURE: 132 MMHG

## 2022-10-10 DIAGNOSIS — E78.2 MIXED HYPERLIPIDEMIA: ICD-10-CM

## 2022-10-10 DIAGNOSIS — Z23 IMMUNIZATION DUE: Primary | ICD-10-CM

## 2022-10-10 DIAGNOSIS — G47.33 OSA ON CPAP: ICD-10-CM

## 2022-10-10 DIAGNOSIS — I10 ESSENTIAL HYPERTENSION: ICD-10-CM

## 2022-10-10 DIAGNOSIS — M54.50 LUMBAR PAIN: ICD-10-CM

## 2022-10-10 DIAGNOSIS — M17.11 PRIMARY OSTEOARTHRITIS OF RIGHT KNEE: ICD-10-CM

## 2022-10-10 DIAGNOSIS — I10 ESSENTIAL HYPERTENSION: Primary | ICD-10-CM

## 2022-10-10 LAB
ALBUMIN SERPL BCP-MCNC: 4 G/DL (ref 3.5–5.2)
ALP SERPL-CCNC: 77 U/L (ref 55–135)
ALT SERPL W/O P-5'-P-CCNC: 31 U/L (ref 10–44)
ANION GAP SERPL CALC-SCNC: 10 MMOL/L (ref 8–16)
AST SERPL-CCNC: 22 U/L (ref 10–40)
BILIRUB SERPL-MCNC: 0.5 MG/DL (ref 0.1–1)
BUN SERPL-MCNC: 19 MG/DL (ref 8–23)
CALCIUM SERPL-MCNC: 9.5 MG/DL (ref 8.7–10.5)
CHLORIDE SERPL-SCNC: 105 MMOL/L (ref 95–110)
CHOLEST SERPL-MCNC: 113 MG/DL (ref 120–199)
CHOLEST/HDLC SERPL: 3 {RATIO} (ref 2–5)
CO2 SERPL-SCNC: 26 MMOL/L (ref 23–29)
CREAT SERPL-MCNC: 0.9 MG/DL (ref 0.5–1.4)
EST. GFR  (NO RACE VARIABLE): >60 ML/MIN/1.73 M^2
GLUCOSE SERPL-MCNC: 99 MG/DL (ref 70–110)
HDLC SERPL-MCNC: 38 MG/DL (ref 40–75)
HDLC SERPL: 33.6 % (ref 20–50)
LDLC SERPL CALC-MCNC: 57.2 MG/DL (ref 63–159)
NONHDLC SERPL-MCNC: 75 MG/DL
POTASSIUM SERPL-SCNC: 3.9 MMOL/L (ref 3.5–5.1)
PROT SERPL-MCNC: 6.9 G/DL (ref 6–8.4)
SODIUM SERPL-SCNC: 141 MMOL/L (ref 136–145)
TRIGL SERPL-MCNC: 89 MG/DL (ref 30–150)

## 2022-10-10 PROCEDURE — 3078F DIAST BP <80 MM HG: CPT | Mod: CPTII,S$GLB,, | Performed by: FAMILY MEDICINE

## 2022-10-10 PROCEDURE — 0124A COVID-19, MRNA, LNP-S, BIVALENT BOOSTER, PF, 30 MCG/0.3 ML DOSE: CPT | Mod: PBBFAC | Performed by: FAMILY MEDICINE

## 2022-10-10 PROCEDURE — 90694 FLU VACCINE - QUADRIVALENT - ADJUVANTED: ICD-10-PCS | Mod: S$GLB,,, | Performed by: FAMILY MEDICINE

## 2022-10-10 PROCEDURE — 99214 PR OFFICE/OUTPT VISIT, EST, LEVL IV, 30-39 MIN: ICD-10-PCS | Mod: S$GLB,,, | Performed by: FAMILY MEDICINE

## 2022-10-10 PROCEDURE — 1125F AMNT PAIN NOTED PAIN PRSNT: CPT | Mod: CPTII,S$GLB,, | Performed by: FAMILY MEDICINE

## 2022-10-10 PROCEDURE — 91312 COVID-19, MRNA, LNP-S, BIVALENT BOOSTER, PF, 30 MCG/0.3 ML DOSE: CPT | Mod: S$GLB,,, | Performed by: FAMILY MEDICINE

## 2022-10-10 PROCEDURE — 3288F PR FALLS RISK ASSESSMENT DOCUMENTED: ICD-10-PCS | Mod: CPTII,S$GLB,, | Performed by: FAMILY MEDICINE

## 2022-10-10 PROCEDURE — 1160F RVW MEDS BY RX/DR IN RCRD: CPT | Mod: CPTII,S$GLB,, | Performed by: FAMILY MEDICINE

## 2022-10-10 PROCEDURE — 1125F PR PAIN SEVERITY QUANTIFIED, PAIN PRESENT: ICD-10-PCS | Mod: CPTII,S$GLB,, | Performed by: FAMILY MEDICINE

## 2022-10-10 PROCEDURE — 99214 OFFICE O/P EST MOD 30 MIN: CPT | Mod: S$GLB,,, | Performed by: FAMILY MEDICINE

## 2022-10-10 PROCEDURE — 99999 PR PBB SHADOW E&M-EST. PATIENT-LVL V: ICD-10-PCS | Mod: PBBFAC,,, | Performed by: FAMILY MEDICINE

## 2022-10-10 PROCEDURE — 1160F PR REVIEW ALL MEDS BY PRESCRIBER/CLIN PHARMACIST DOCUMENTED: ICD-10-PCS | Mod: CPTII,S$GLB,, | Performed by: FAMILY MEDICINE

## 2022-10-10 PROCEDURE — 99999 PR PBB SHADOW E&M-EST. PATIENT-LVL V: CPT | Mod: PBBFAC,,, | Performed by: FAMILY MEDICINE

## 2022-10-10 PROCEDURE — 3288F FALL RISK ASSESSMENT DOCD: CPT | Mod: CPTII,S$GLB,, | Performed by: FAMILY MEDICINE

## 2022-10-10 PROCEDURE — 91312 COVID-19, MRNA, LNP-S, BIVALENT BOOSTER, PF, 30 MCG/0.3 ML DOSE: ICD-10-PCS | Mod: S$GLB,,, | Performed by: FAMILY MEDICINE

## 2022-10-10 PROCEDURE — 3075F SYST BP GE 130 - 139MM HG: CPT | Mod: CPTII,S$GLB,, | Performed by: FAMILY MEDICINE

## 2022-10-10 PROCEDURE — 1100F PTFALLS ASSESS-DOCD GE2>/YR: CPT | Mod: CPTII,S$GLB,, | Performed by: FAMILY MEDICINE

## 2022-10-10 PROCEDURE — 3078F PR MOST RECENT DIASTOLIC BLOOD PRESSURE < 80 MM HG: ICD-10-PCS | Mod: CPTII,S$GLB,, | Performed by: FAMILY MEDICINE

## 2022-10-10 PROCEDURE — 1159F MED LIST DOCD IN RCRD: CPT | Mod: CPTII,S$GLB,, | Performed by: FAMILY MEDICINE

## 2022-10-10 PROCEDURE — 1100F PR PT FALLS ASSESS DOC 2+ FALLS/FALL W/INJURY/YR: ICD-10-PCS | Mod: CPTII,S$GLB,, | Performed by: FAMILY MEDICINE

## 2022-10-10 PROCEDURE — 90694 VACC AIIV4 NO PRSRV 0.5ML IM: CPT | Mod: S$GLB,,, | Performed by: FAMILY MEDICINE

## 2022-10-10 PROCEDURE — G0008 ADMIN INFLUENZA VIRUS VAC: HCPCS | Mod: S$GLB,,, | Performed by: FAMILY MEDICINE

## 2022-10-10 PROCEDURE — 3075F PR MOST RECENT SYSTOLIC BLOOD PRESS GE 130-139MM HG: ICD-10-PCS | Mod: CPTII,S$GLB,, | Performed by: FAMILY MEDICINE

## 2022-10-10 PROCEDURE — 36415 COLL VENOUS BLD VENIPUNCTURE: CPT | Mod: S$GLB,,, | Performed by: FAMILY MEDICINE

## 2022-10-10 PROCEDURE — G0008 FLU VACCINE - QUADRIVALENT - ADJUVANTED: ICD-10-PCS | Mod: S$GLB,,, | Performed by: FAMILY MEDICINE

## 2022-10-10 PROCEDURE — 80061 LIPID PANEL: CPT | Performed by: FAMILY MEDICINE

## 2022-10-10 PROCEDURE — 36415 PR COLLECTION VENOUS BLOOD,VENIPUNCTURE: ICD-10-PCS | Mod: S$GLB,,, | Performed by: FAMILY MEDICINE

## 2022-10-10 PROCEDURE — 1159F PR MEDICATION LIST DOCUMENTED IN MEDICAL RECORD: ICD-10-PCS | Mod: CPTII,S$GLB,, | Performed by: FAMILY MEDICINE

## 2022-10-10 PROCEDURE — 80053 COMPREHEN METABOLIC PANEL: CPT | Performed by: FAMILY MEDICINE

## 2022-10-10 RX ORDER — FUROSEMIDE 40 MG/1
40 TABLET ORAL 2 TIMES DAILY
Qty: 60 TABLET | Refills: 5 | Status: SHIPPED | OUTPATIENT
Start: 2022-10-10 | End: 2023-04-24

## 2022-10-10 RX ORDER — LOSARTAN POTASSIUM 50 MG/1
50 TABLET ORAL DAILY
Qty: 30 TABLET | Refills: 5 | Status: SHIPPED | OUTPATIENT
Start: 2022-10-10 | End: 2023-04-24

## 2022-10-10 RX ORDER — AMLODIPINE BESYLATE 5 MG/1
5 TABLET ORAL DAILY
Qty: 30 TABLET | Refills: 5 | Status: SHIPPED | OUTPATIENT
Start: 2022-10-10 | End: 2023-04-24

## 2022-10-10 RX ORDER — POTASSIUM CHLORIDE 20 MEQ/1
20 TABLET, EXTENDED RELEASE ORAL DAILY
Qty: 90 TABLET | Refills: 1 | Status: SHIPPED | OUTPATIENT
Start: 2022-10-10 | End: 2023-03-26

## 2022-10-10 NOTE — PROGRESS NOTES
Pt is a 77 y.o. male who presents for check up for   Encounter Diagnoses   Name Primary?    Primary osteoarthritis of right knee     Essential hypertension Yes    MARYLU on CPAP     Mixed hyperlipidemia     Lumbar pain    . Doing well on current meds. Denies any side effects. Prevention is not up to date.    HPI: Patient here for a checkup. S/P left knee replacement.  Doing well, but right knee is painful.  Back is hurting.  Patient has obstructive sleep apnea.  He tolerates his CPAP well.  He was diagnosed with Mobitz type II heart block.  He has a pace maker.  Doing well.  His blood pressure is well controlled with Lotrel.  He does not have side effects such as swelling or chronic dry cough.  He checks his blood pressure at home it is usually better than here.  Sees cardiology at this time.  His allergy symptoms are controlled when he is using his fluticasone and patanase.  He saw ENT and they removed polyps which helped a little.    His cholesterol is under good control.  He takes Lipitor 20 mg daily.  He is not having side effects such as flushing or myalgias.    Patient has significant osteoarthritis in his right knee.  He takes glucosamine chondroitin sulfate and Advil.  It is not helping very much.  It is slowing down his ballroom dancing.    ROS:   Gen.: No fever, chills, weight loss, weight gain  HEENT: No headaches, sore throat, change in vision  CV: No chest pain  RESP: No shortness of breath, wheezing, cough  GI: No change in bowel habits, no diarrhea, no abdominal pain, no hematochezia  : No dysuria, frequency  MSK: knee pain, arthritis, back pain  NEURO: No numbness, weakness  ENDO: No polyuria, polydipsia, heat or cold intolerance    PMH, PSH, ALLERGIES, SH, FH reviewed  Medications reviewed nurses notes  Last colonoscopy 2/25/14    PHYSICAL EXAM;   Vitals:    10/10/22 1053   BP: 132/78   Pulse: 60   Resp: 18     APPEARANCE: Well nourished, well developed, in no acute distress.   HEENT:  griselda nasal  congestion, Clear rhinorrhea, pale boggy nasal mucosa.  Nasal polyps seen.    CHEST: Lungs clear to auscultation.  CARDIOVASCULAR: Normal S1, S2. No rubs, murmurs or gallops.  Irregular rhythm consistent with Mobitz type II heart block  ABDOMEN: Bowel sounds normal. Not distended. Soft. No tenderness or masses.  : normal prostate. done on 10/17.  MUSCULOSKELETAL: Right knee with crepitus, valgus deformity.  No effusions.  No edema.  Left knee s/p TKR.  Back: Moderate tenderness and spasm the lumbar region, negative straight leg raise, DTRs are 2+ and equal in knees and ankles.  Strength in the legs is 5+ equally.  Straight leg raise did cause lumbar pain and mild pain into the leg.    Lab Results   Component Value Date    LDLCALC 75.8 03/30/2021     BMP  Lab Results   Component Value Date     11/01/2021    K 4.1 11/01/2021     11/01/2021    CO2 28 11/01/2021    BUN 12 11/01/2021    CREATININE 1.1 11/01/2021    CALCIUM 9.6 11/01/2021    ANIONGAP 8 11/01/2021    ESTGFRAFRICA >60.0 11/01/2021    EGFRNONAA >60.0 11/01/2021     Lab Results   Component Value Date    PSA 10.0 (H) 03/20/2020    PSA 6.8 (H) 04/25/2019     ASSESSMENT/PLAN:    Hypertension  Two gram sodium diet.    Weight loss discussed.    Try to walk 2 miles per day.    Patient now realizes he must take his medication every day   Current medications will be:  - amlodipine 5 mg po daily        Losartan 50 mg per capsule; Take 1 capsule by mouth once daily.  - aspirin (ECOTRIN) 81 MG EC tablet; Take 1 tablet (81 mg total) by mouth 2 (two) times daily.  -     Lasix 40 mg 2 tabs daily    Hyperlipidemia  Low fat diet.  Avoid sweets.  A 10 pound weight loss by the next visit as a  good goal.  Increase consumption of fruits and vegetables, fish and chicken.  Use medications below:  - fish oil-omega-3 fatty acids 300-1,000 mg capsule; Take 2 capsules (2 g total) by mouth once daily.  - Lipitor 20 mg by mouth daily    Obstructive sleep apnea (adult)  (pediatric)  Continue using CPAP.    Ar (allergic rhinitis)  - fluticasone (VERAMYST) 27.5 mcg/actuation nasal spray; 2 sprays by Nasal route daily 2 hours after breakfast. 2 Spray, Suspension Nasal Every day.  prn allergies  - olopatadine (PATANASE) 0.6 % Spry; 2 sprays by Nasal route 2 (two) times daily. 2 Spray, Non-Aerosol Nasal Twice a day    Osteoarthritis  naproxen sodium (ALEVE) 220 mg Cap; Take 220 mg by mouth 3 (three) times daily as needed. 1 Capsule Oral Twice a day  If knee pain persists, return to clinic for a joint injection.  - GLUC STEPHENS/CHONDRO STEPHENS A/VIT C/MN (GLUCOSAMINE 1500 COMPLEX ORAL); Take 2 tablets by mouth once daily.    - HYALURONATE SODIUM (HYALURONIC ACID, SODIUM, ORAL); Take 4 capsules by mouth once daily.    Refer to orthopedist for Synvisc injections    Elevated PSA  Keep appt with urology  Had prostate biopsy    1. Essential hypertension  -     amLODIPine (NORVASC) 5 MG tablet; Take 1 tablet (5 mg total) by mouth once daily.  Dispense: 30 tablet; Refill: 5  -     furosemide (LASIX) 40 MG tablet; Take 1 tablet (40 mg total) by mouth 2 (two) times daily.  Dispense: 60 tablet; Refill: 5  -     losartan (COZAAR) 50 MG tablet; Take 1 tablet (50 mg total) by mouth once daily.  Dispense: 30 tablet; Refill: 5  -     potassium chloride SA (K-DUR,KLOR-CON) 20 MEQ tablet; Take 1 tablet (20 mEq total) by mouth once daily.  Dispense: 90 tablet; Refill: 1  -     Comprehensive Metabolic Panel; Future; Expected date: 10/10/2022    2. Primary osteoarthritis of right knee  -     Ambulatory referral/consult to Orthopedics; Future; Expected date: 10/17/2022  -     Ambulatory referral/consult to Physical/Occupational Therapy; Future; Expected date: 10/17/2022    3. MARYLU on CPAP    4. Mixed hyperlipidemia  -     Lipid Panel; Future; Expected date: 10/10/2022    5. Lumbar pain  -     Ambulatory referral/consult to Physical/Occupational Therapy; Future; Expected date: 10/17/2022       Next appointment will be  in 6 months.

## 2022-10-11 ENCOUNTER — PATIENT MESSAGE (OUTPATIENT)
Dept: ADMINISTRATIVE | Facility: OTHER | Age: 77
End: 2022-10-11
Payer: MEDICARE

## 2022-10-11 ENCOUNTER — APPOINTMENT (OUTPATIENT)
Dept: RADIOLOGY | Facility: CLINIC | Age: 77
End: 2022-10-11
Attending: PHYSICIAN ASSISTANT
Payer: MEDICARE

## 2022-10-11 ENCOUNTER — TELEPHONE (OUTPATIENT)
Dept: ORTHOPEDICS | Facility: CLINIC | Age: 77
End: 2022-10-11
Payer: MEDICARE

## 2022-10-11 ENCOUNTER — OFFICE VISIT (OUTPATIENT)
Dept: ORTHOPEDICS | Facility: CLINIC | Age: 77
End: 2022-10-11
Payer: MEDICARE

## 2022-10-11 VITALS
SYSTOLIC BLOOD PRESSURE: 118 MMHG | HEIGHT: 72 IN | RESPIRATION RATE: 18 BRPM | DIASTOLIC BLOOD PRESSURE: 70 MMHG | WEIGHT: 269.38 LBS | HEART RATE: 80 BPM | BODY MASS INDEX: 36.49 KG/M2

## 2022-10-11 DIAGNOSIS — M25.561 RIGHT KNEE PAIN, UNSPECIFIED CHRONICITY: Primary | ICD-10-CM

## 2022-10-11 DIAGNOSIS — M25.561 RIGHT KNEE PAIN, UNSPECIFIED CHRONICITY: ICD-10-CM

## 2022-10-11 DIAGNOSIS — M17.11 PRIMARY OSTEOARTHRITIS OF RIGHT KNEE: Primary | ICD-10-CM

## 2022-10-11 PROCEDURE — 1125F AMNT PAIN NOTED PAIN PRSNT: CPT | Mod: CPTII,S$GLB,, | Performed by: PHYSICIAN ASSISTANT

## 2022-10-11 PROCEDURE — 99213 PR OFFICE/OUTPT VISIT, EST, LEVL III, 20-29 MIN: ICD-10-PCS | Mod: S$GLB,,, | Performed by: PHYSICIAN ASSISTANT

## 2022-10-11 PROCEDURE — 73562 X-RAY EXAM OF KNEE 3: CPT | Mod: 26,LT,, | Performed by: RADIOLOGY

## 2022-10-11 PROCEDURE — 99999 PR PBB SHADOW E&M-EST. PATIENT-LVL IV: CPT | Mod: PBBFAC,,, | Performed by: PHYSICIAN ASSISTANT

## 2022-10-11 PROCEDURE — 73562 X-RAY EXAM OF KNEE 3: CPT | Mod: TC,59,PO,LT

## 2022-10-11 PROCEDURE — 73562 XR KNEE ORTHO RIGHT WITH FLEXION: ICD-10-PCS | Mod: 26,LT,, | Performed by: RADIOLOGY

## 2022-10-11 PROCEDURE — 1159F MED LIST DOCD IN RCRD: CPT | Mod: CPTII,S$GLB,, | Performed by: PHYSICIAN ASSISTANT

## 2022-10-11 PROCEDURE — 1160F RVW MEDS BY RX/DR IN RCRD: CPT | Mod: CPTII,S$GLB,, | Performed by: PHYSICIAN ASSISTANT

## 2022-10-11 PROCEDURE — 1125F PR PAIN SEVERITY QUANTIFIED, PAIN PRESENT: ICD-10-PCS | Mod: CPTII,S$GLB,, | Performed by: PHYSICIAN ASSISTANT

## 2022-10-11 PROCEDURE — 3074F PR MOST RECENT SYSTOLIC BLOOD PRESSURE < 130 MM HG: ICD-10-PCS | Mod: CPTII,S$GLB,, | Performed by: PHYSICIAN ASSISTANT

## 2022-10-11 PROCEDURE — 73564 X-RAY EXAM KNEE 4 OR MORE: CPT | Mod: 26,RT,, | Performed by: RADIOLOGY

## 2022-10-11 PROCEDURE — 73564 XR KNEE ORTHO RIGHT WITH FLEXION: ICD-10-PCS | Mod: 26,RT,, | Performed by: RADIOLOGY

## 2022-10-11 PROCEDURE — 1160F PR REVIEW ALL MEDS BY PRESCRIBER/CLIN PHARMACIST DOCUMENTED: ICD-10-PCS | Mod: CPTII,S$GLB,, | Performed by: PHYSICIAN ASSISTANT

## 2022-10-11 PROCEDURE — 3078F PR MOST RECENT DIASTOLIC BLOOD PRESSURE < 80 MM HG: ICD-10-PCS | Mod: CPTII,S$GLB,, | Performed by: PHYSICIAN ASSISTANT

## 2022-10-11 PROCEDURE — 3074F SYST BP LT 130 MM HG: CPT | Mod: CPTII,S$GLB,, | Performed by: PHYSICIAN ASSISTANT

## 2022-10-11 PROCEDURE — 3078F DIAST BP <80 MM HG: CPT | Mod: CPTII,S$GLB,, | Performed by: PHYSICIAN ASSISTANT

## 2022-10-11 PROCEDURE — 1159F PR MEDICATION LIST DOCUMENTED IN MEDICAL RECORD: ICD-10-PCS | Mod: CPTII,S$GLB,, | Performed by: PHYSICIAN ASSISTANT

## 2022-10-11 PROCEDURE — 99213 OFFICE O/P EST LOW 20 MIN: CPT | Mod: S$GLB,,, | Performed by: PHYSICIAN ASSISTANT

## 2022-10-11 PROCEDURE — 99999 PR PBB SHADOW E&M-EST. PATIENT-LVL IV: ICD-10-PCS | Mod: PBBFAC,,, | Performed by: PHYSICIAN ASSISTANT

## 2022-10-11 NOTE — PROGRESS NOTES
Subjective:      Patient ID: Alejo Melvin is a 77 y.o. male.    Chief Complaint: Pain of the Right Knee    Review of patient's allergies indicates:   Allergen Reactions    No known drug allergies         Pt returns to clinic with c/o right knee pain x few weeks.  He reports good relief since Euflexxa injections about 6 months ago.  He is requesting to repeat Euflexxa injections, states that he is not ready for knee replacement at this time.    4/12/22:  75 yo M presents to clinic with c/o intermittent right knee pain x years.  Pain described as achy/stiff and located to medial knee.  Worse after prolonged sitting and standing, better with rest.  Occasional swelling to knee.  He previously completed Euflexxa series 6 months ago and has had good relief of sx until the past few weeks.  Takes tylenol as needed.  Hx of left TKA last year.  Pt states that he enjoys ballroom dancing, and knee pain is preventing him from dancing as much as he would like.  He is requesting to repeat Euflexxa at this time, says that he may consider right knee replacement in the future but is going to a week long dance camp in 2 months and would like to wait until after camp to consider surgery.                Review of Systems   Constitutional: Negative for chills, diaphoresis and fever.   HENT:  Negative for congestion, ear discharge and ear pain.    Eyes:  Negative for blurred vision, discharge, double vision and pain.   Cardiovascular:  Negative for chest pain, claudication and cyanosis.   Respiratory:  Negative for cough, hemoptysis and shortness of breath.    Endocrine: Negative for cold intolerance and heat intolerance.   Skin:  Negative for color change, dry skin, itching and rash.   Musculoskeletal:  Positive for joint pain. Negative for arthritis, back pain, falls, gout, joint swelling, muscle weakness and neck pain.   Gastrointestinal:  Negative for abdominal pain and change in bowel habit.   Neurological:  Negative for  brief paralysis, disturbances in coordination and dizziness.   Psychiatric/Behavioral:  Negative for altered mental status and depression.        Objective:          General    Constitutional: He is oriented to person, place, and time. He appears well-developed and well-nourished. No distress.   HENT:   Head: Atraumatic.   Eyes: EOM are normal. Right eye exhibits no discharge. Left eye exhibits no discharge.   Cardiovascular:  Normal rate.            Pulmonary/Chest: Effort normal. No respiratory distress.   Abdominal: Soft.   Neurological: He is alert and oriented to person, place, and time.   Psychiatric: He has a normal mood and affect. His behavior is normal.           Right Knee Exam     Inspection   Erythema: absent  Scars: absent  Swelling: absent  Effusion: absent  Deformity: present (varus)  Bruising: absent    Tenderness   The patient is tender to palpation of the medial joint line.    Range of Motion   Extension:  0   Flexion:  130     Tests   Ligament Examination   MCL - Valgus: normal (0 to 2mm)  LCL - Varus: normal    Other   Sensation: normal    Left Knee Exam     Inspection   Erythema: absent  Scars: present  Swelling: absent  Effusion: absent  Deformity: absent  Bruising: absent    Tenderness   The patient is experiencing no tenderness.     Range of Motion   Extension:  0   Left knee flexion: 125.     Tests   Stability   MCL - Valgus: normal (0 to 2mm)  LCL - Varus: normal (0 to 2mm)    Other   Sensation: normal    Vascular Exam     Right Pulses  Dorsalis Pedis:      2+          Left Pulses  Dorsalis Pedis:      2+          Edema  Right Lower Leg: absent  Left Lower Leg: absent            Assessment:         Xray Left Knee 12/22/21:  Left knee: There is a TKA in place good alignment no complication.     Right knee: There is moderate DJD and a varus deformity.  No fracture dislocation bone destruction or OCD seen.  Kellgren Gilmer 3.      Encounter Diagnosis   Name Primary?    Primary  osteoarthritis of right knee Yes    Primary osteoarthritis of right knee  -     Ambulatory referral/consult to Orthopedics  -     sodium hyaluronate (EUFLEXXA) 10 mg/mL(mw 2.4 -3.6 million) injection 20 mg  -     Prior authorization Order             Plan:         The total face-to-face encounter time with this patient was 30 min and greater than 50% of of the encounter time was spent counseling the patient, coordinating care, and education regarding the pathology and natural history of his diagnosis. We have discussed a variety of treatment options including medications, injections, physical therapy and other alternative treatments. I also explained the indications, risks and benefits of surgery. Pt would like to proceed with visco-supplementation at this time.    I made the decision to obtain old records of the patient including previous notes and imaging. New imaging was ordered today of the extremity or extremities evaluated. I independently reviewed and interpreted the radiographs and/or MRIs today as well as prior imaging.      1. Pre-authorization placed for Euflexxa series injections.  2. Ice compress to the affected area 2-3x a day for 15-20 minutes as needed for pain management.  3. Continue tylenol as directed as needed.  4. RTC on Nov 4 for Euflexxa series injections #1 of 3, sooner if needed.    Patient voices understanding of and agreement with treatment plan. All of the patient's questions were answered and the patient will contact us if he has any questions or concerns in the interim.

## 2022-10-11 NOTE — TELEPHONE ENCOUNTER
Orthopedic referral sent to [facility].  Unable to leave message, NO VM.  Attempted to contact patient for EP visit with [facility]uel[facility].  Patient needs right knee xray

## 2022-11-01 LAB
CHOLEST SERPL-MSCNC: 236 MG/DL (ref 0–200)
HDLC SERPL-MCNC: 32 MG/DL (ref 35–70)
LDLC SERPL CALC-MCNC: 178 MG/DL (ref 0–160)
TRIGL SERPL-MCNC: 139 MG/DL (ref 40–160)
VLDLC SERPL-MCNC: 26 MG/DL

## 2022-11-15 ENCOUNTER — OFFICE VISIT (OUTPATIENT)
Dept: ORTHOPEDICS | Facility: CLINIC | Age: 77
End: 2022-11-15
Payer: MEDICARE

## 2022-11-15 VITALS — SYSTOLIC BLOOD PRESSURE: 122 MMHG | DIASTOLIC BLOOD PRESSURE: 70 MMHG

## 2022-11-15 DIAGNOSIS — M17.11 PRIMARY OSTEOARTHRITIS OF RIGHT KNEE: Primary | ICD-10-CM

## 2022-11-15 PROCEDURE — 20610 DRAIN/INJ JOINT/BURSA W/O US: CPT | Mod: RT,S$GLB,, | Performed by: PHYSICIAN ASSISTANT

## 2022-11-15 PROCEDURE — 3074F SYST BP LT 130 MM HG: CPT | Mod: CPTII,S$GLB,, | Performed by: PHYSICIAN ASSISTANT

## 2022-11-15 PROCEDURE — 3078F DIAST BP <80 MM HG: CPT | Mod: CPTII,S$GLB,, | Performed by: PHYSICIAN ASSISTANT

## 2022-11-15 PROCEDURE — 3078F PR MOST RECENT DIASTOLIC BLOOD PRESSURE < 80 MM HG: ICD-10-PCS | Mod: CPTII,S$GLB,, | Performed by: PHYSICIAN ASSISTANT

## 2022-11-15 PROCEDURE — 99999 PR PBB SHADOW E&M-EST. PATIENT-LVL III: ICD-10-PCS | Mod: PBBFAC,,, | Performed by: PHYSICIAN ASSISTANT

## 2022-11-15 PROCEDURE — 99999 PR PBB SHADOW E&M-EST. PATIENT-LVL III: CPT | Mod: PBBFAC,,, | Performed by: PHYSICIAN ASSISTANT

## 2022-11-15 PROCEDURE — 1159F PR MEDICATION LIST DOCUMENTED IN MEDICAL RECORD: ICD-10-PCS | Mod: CPTII,S$GLB,, | Performed by: PHYSICIAN ASSISTANT

## 2022-11-15 PROCEDURE — 1159F MED LIST DOCD IN RCRD: CPT | Mod: CPTII,S$GLB,, | Performed by: PHYSICIAN ASSISTANT

## 2022-11-15 PROCEDURE — 3074F PR MOST RECENT SYSTOLIC BLOOD PRESSURE < 130 MM HG: ICD-10-PCS | Mod: CPTII,S$GLB,, | Performed by: PHYSICIAN ASSISTANT

## 2022-11-15 PROCEDURE — 20610 PR DRAIN/INJECT LARGE JOINT/BURSA: ICD-10-PCS | Mod: RT,S$GLB,, | Performed by: PHYSICIAN ASSISTANT

## 2022-11-15 PROCEDURE — 99499 NO LOS: ICD-10-PCS | Mod: S$GLB,,, | Performed by: PHYSICIAN ASSISTANT

## 2022-11-15 PROCEDURE — 99499 UNLISTED E&M SERVICE: CPT | Mod: S$GLB,,, | Performed by: PHYSICIAN ASSISTANT

## 2022-11-15 PROCEDURE — 1160F RVW MEDS BY RX/DR IN RCRD: CPT | Mod: CPTII,S$GLB,, | Performed by: PHYSICIAN ASSISTANT

## 2022-11-15 PROCEDURE — 1160F PR REVIEW ALL MEDS BY PRESCRIBER/CLIN PHARMACIST DOCUMENTED: ICD-10-PCS | Mod: CPTII,S$GLB,, | Performed by: PHYSICIAN ASSISTANT

## 2022-11-15 RX ORDER — EZETIMIBE 10 MG/1
10 TABLET ORAL DAILY
COMMUNITY
Start: 2022-11-03 | End: 2024-03-26

## 2022-11-15 NOTE — PROGRESS NOTES
Alejo Melvin is here for follow up of right knee arthritis. Pt is requesting Euflexxa series injections #1 of 3.  PMHx reviewed per encounter record. Has failed other conservative modalities including NSAIDS, activity modification, weight loss.    The prior injection was tolerated well.    PHYSICAL EXAMINATION:     General: The patient is alert and oriented x 3. Mood is pleasant.   Observation of ears, eyes and nose reveals no gross abnormalities. No   labored breathing observed.     No signs of infection or adverse reaction to knee.    Injection Procedure  A time out was performed, including verification of patient ID, procedure, site and side, availability of information and equipment, review of safety issues, and agreement with consent, the procedure site was marked.    After time out was performed, the patient was prepped aseptically with chloraprep. A diagnostic and therapeutic injection of 2cc Euflexxa was given under sterile technique using a 22g x 1.5 needle from the Anterolateral  aspect of the right Knee Joint in the seated position.      Alejo Melvin had no adverse reactions to the medication. He was instructed to apply ice to the joint for 20 minutes and avoid strenuous activities for 24-36 hours following the injection. He was warned of possible blood sugar and/or blood pressure changes during that time. Following that time, he can resume regular activities.    He was reminded to call the clinic immediately for any adverse side effects as explained in clinic today.    RTC in 1 week for Euflexxa series injections #2 of 3, sooner if needed.    Patient voices understanding of and agreement with treatment plan. All of the patient's questions were answered and the patient will contact us if they have any questions or concerns in the interim.

## 2022-11-22 ENCOUNTER — OFFICE VISIT (OUTPATIENT)
Dept: ORTHOPEDICS | Facility: CLINIC | Age: 77
End: 2022-11-22
Payer: MEDICARE

## 2022-11-22 DIAGNOSIS — M17.11 PRIMARY OSTEOARTHRITIS OF RIGHT KNEE: Primary | ICD-10-CM

## 2022-11-22 PROCEDURE — 1159F MED LIST DOCD IN RCRD: CPT | Mod: CPTII,S$GLB,, | Performed by: PHYSICIAN ASSISTANT

## 2022-11-22 PROCEDURE — 99499 NO LOS: ICD-10-PCS | Mod: S$GLB,,, | Performed by: PHYSICIAN ASSISTANT

## 2022-11-22 PROCEDURE — 99999 PR PBB SHADOW E&M-EST. PATIENT-LVL III: CPT | Mod: PBBFAC,,, | Performed by: PHYSICIAN ASSISTANT

## 2022-11-22 PROCEDURE — 20600 DRAIN/INJ JOINT/BURSA W/O US: CPT | Mod: RT,S$GLB,, | Performed by: PHYSICIAN ASSISTANT

## 2022-11-22 PROCEDURE — 20600 PR DRAIN/INJECT SMALL JOINT/BURSA: ICD-10-PCS | Mod: RT,S$GLB,, | Performed by: PHYSICIAN ASSISTANT

## 2022-11-22 PROCEDURE — 99499 UNLISTED E&M SERVICE: CPT | Mod: S$GLB,,, | Performed by: PHYSICIAN ASSISTANT

## 2022-11-22 PROCEDURE — 1160F RVW MEDS BY RX/DR IN RCRD: CPT | Mod: CPTII,S$GLB,, | Performed by: PHYSICIAN ASSISTANT

## 2022-11-22 PROCEDURE — 1159F PR MEDICATION LIST DOCUMENTED IN MEDICAL RECORD: ICD-10-PCS | Mod: CPTII,S$GLB,, | Performed by: PHYSICIAN ASSISTANT

## 2022-11-22 PROCEDURE — 1160F PR REVIEW ALL MEDS BY PRESCRIBER/CLIN PHARMACIST DOCUMENTED: ICD-10-PCS | Mod: CPTII,S$GLB,, | Performed by: PHYSICIAN ASSISTANT

## 2022-11-22 PROCEDURE — 99999 PR PBB SHADOW E&M-EST. PATIENT-LVL III: ICD-10-PCS | Mod: PBBFAC,,, | Performed by: PHYSICIAN ASSISTANT

## 2022-11-29 ENCOUNTER — OFFICE VISIT (OUTPATIENT)
Dept: ORTHOPEDICS | Facility: CLINIC | Age: 77
End: 2022-11-29
Payer: MEDICARE

## 2022-11-29 DIAGNOSIS — M17.11 PRIMARY OSTEOARTHRITIS OF RIGHT KNEE: Primary | ICD-10-CM

## 2022-11-29 PROCEDURE — 1160F PR REVIEW ALL MEDS BY PRESCRIBER/CLIN PHARMACIST DOCUMENTED: ICD-10-PCS | Mod: CPTII,S$GLB,, | Performed by: PHYSICIAN ASSISTANT

## 2022-11-29 PROCEDURE — 1159F MED LIST DOCD IN RCRD: CPT | Mod: CPTII,S$GLB,, | Performed by: PHYSICIAN ASSISTANT

## 2022-11-29 PROCEDURE — 99499 UNLISTED E&M SERVICE: CPT | Mod: S$GLB,,, | Performed by: PHYSICIAN ASSISTANT

## 2022-11-29 PROCEDURE — 20610 DRAIN/INJ JOINT/BURSA W/O US: CPT | Mod: RT,S$GLB,, | Performed by: PHYSICIAN ASSISTANT

## 2022-11-29 PROCEDURE — 1159F PR MEDICATION LIST DOCUMENTED IN MEDICAL RECORD: ICD-10-PCS | Mod: CPTII,S$GLB,, | Performed by: PHYSICIAN ASSISTANT

## 2022-11-29 PROCEDURE — 20610 PR DRAIN/INJECT LARGE JOINT/BURSA: ICD-10-PCS | Mod: RT,S$GLB,, | Performed by: PHYSICIAN ASSISTANT

## 2022-11-29 PROCEDURE — 99499 NO LOS: ICD-10-PCS | Mod: S$GLB,,, | Performed by: PHYSICIAN ASSISTANT

## 2022-11-29 PROCEDURE — 99999 PR PBB SHADOW E&M-EST. PATIENT-LVL III: ICD-10-PCS | Mod: PBBFAC,,, | Performed by: PHYSICIAN ASSISTANT

## 2022-11-29 PROCEDURE — 1160F RVW MEDS BY RX/DR IN RCRD: CPT | Mod: CPTII,S$GLB,, | Performed by: PHYSICIAN ASSISTANT

## 2022-11-29 PROCEDURE — 99999 PR PBB SHADOW E&M-EST. PATIENT-LVL III: CPT | Mod: PBBFAC,,, | Performed by: PHYSICIAN ASSISTANT

## 2022-11-29 NOTE — PROGRESS NOTES
Alejo Melvin is here for follow up of right knee arthritis. Pt is requesting Euflexxa series injections #3 of 3.  PMHx reviewed per encounter record. Has failed other conservative modalities including NSAIDS, activity modification, weight loss.    The prior injection was tolerated well.    PHYSICAL EXAMINATION:     General: The patient is alert and oriented x 3. Mood is pleasant.   Observation of ears, eyes and nose reveals no gross abnormalities. No   labored breathing observed.     No signs of infection or adverse reaction to knee.    Injection Procedure  A time out was performed, including verification of patient ID, procedure, site and side, availability of information and equipment, review of safety issues, and agreement with consent, the procedure site was marked.    After time out was performed, the patient was prepped aseptically with chloraprep. A diagnostic and therapeutic injection of 2cc Euflexxa was given under sterile technique using a 22g x 1.5 needle from the Anterolateral  aspect of the right Knee Joint in the seated position.      Alejo Melvin had no adverse reactions to the medication. He was instructed to apply ice to the joint for 20 minutes and avoid strenuous activities for 24-36 hours following the injection. He was warned of possible blood sugar and/or blood pressure changes during that time. Following that time, he can resume regular activities.    He was reminded to call the clinic immediately for any adverse side effects as explained in clinic today.    RTC in 1 week for Euflexxa series injections #3 of 3, sooner if needed.    Patient voices understanding of and agreement with treatment plan. All of the patient's questions were answered and the patient will contact us if they have any questions or concerns in the interim.

## 2023-01-10 ENCOUNTER — TELEPHONE (OUTPATIENT)
Dept: ADMINISTRATIVE | Facility: CLINIC | Age: 78
End: 2023-01-10
Payer: MEDICARE

## 2023-01-12 ENCOUNTER — OFFICE VISIT (OUTPATIENT)
Dept: INTERNAL MEDICINE | Facility: CLINIC | Age: 78
End: 2023-01-12
Payer: MEDICARE

## 2023-01-12 VITALS
SYSTOLIC BLOOD PRESSURE: 122 MMHG | BODY MASS INDEX: 38.13 KG/M2 | WEIGHT: 281.5 LBS | HEIGHT: 72 IN | DIASTOLIC BLOOD PRESSURE: 64 MMHG | OXYGEN SATURATION: 98 % | HEART RATE: 68 BPM | RESPIRATION RATE: 16 BRPM

## 2023-01-12 DIAGNOSIS — G47.33 OSA ON CPAP: ICD-10-CM

## 2023-01-12 DIAGNOSIS — E78.2 MIXED HYPERLIPIDEMIA: ICD-10-CM

## 2023-01-12 DIAGNOSIS — C61 PROSTATE CANCER: ICD-10-CM

## 2023-01-12 DIAGNOSIS — M17.12 PRIMARY OSTEOARTHRITIS OF LEFT KNEE: ICD-10-CM

## 2023-01-12 DIAGNOSIS — E66.01 CLASS 2 SEVERE OBESITY DUE TO EXCESS CALORIES WITH SERIOUS COMORBIDITY AND BODY MASS INDEX (BMI) OF 36.0 TO 36.9 IN ADULT: ICD-10-CM

## 2023-01-12 DIAGNOSIS — Z95.0 PACEMAKER: ICD-10-CM

## 2023-01-12 DIAGNOSIS — Z00.00 ENCOUNTER FOR MEDICARE ANNUAL WELLNESS EXAM: ICD-10-CM

## 2023-01-12 DIAGNOSIS — Z00.00 ENCOUNTER FOR PREVENTIVE HEALTH EXAMINATION: Primary | ICD-10-CM

## 2023-01-12 DIAGNOSIS — I10 PRIMARY HYPERTENSION: ICD-10-CM

## 2023-01-12 DIAGNOSIS — G62.9 POLYNEUROPATHY: ICD-10-CM

## 2023-01-12 PROCEDURE — 1170F FXNL STATUS ASSESSED: CPT | Mod: CPTII,S$GLB,, | Performed by: NURSE PRACTITIONER

## 2023-01-12 PROCEDURE — 3288F FALL RISK ASSESSMENT DOCD: CPT | Mod: CPTII,S$GLB,, | Performed by: NURSE PRACTITIONER

## 2023-01-12 PROCEDURE — 1101F PR PT FALLS ASSESS DOC 0-1 FALLS W/OUT INJ PAST YR: ICD-10-PCS | Mod: CPTII,S$GLB,, | Performed by: NURSE PRACTITIONER

## 2023-01-12 PROCEDURE — 1160F PR REVIEW ALL MEDS BY PRESCRIBER/CLIN PHARMACIST DOCUMENTED: ICD-10-PCS | Mod: CPTII,S$GLB,, | Performed by: NURSE PRACTITIONER

## 2023-01-12 PROCEDURE — 3074F SYST BP LT 130 MM HG: CPT | Mod: CPTII,S$GLB,, | Performed by: NURSE PRACTITIONER

## 2023-01-12 PROCEDURE — 3078F PR MOST RECENT DIASTOLIC BLOOD PRESSURE < 80 MM HG: ICD-10-PCS | Mod: CPTII,S$GLB,, | Performed by: NURSE PRACTITIONER

## 2023-01-12 PROCEDURE — 3078F DIAST BP <80 MM HG: CPT | Mod: CPTII,S$GLB,, | Performed by: NURSE PRACTITIONER

## 2023-01-12 PROCEDURE — 1125F AMNT PAIN NOTED PAIN PRSNT: CPT | Mod: CPTII,S$GLB,, | Performed by: NURSE PRACTITIONER

## 2023-01-12 PROCEDURE — 99999 PR PBB SHADOW E&M-EST. PATIENT-LVL V: CPT | Mod: PBBFAC,,, | Performed by: NURSE PRACTITIONER

## 2023-01-12 PROCEDURE — 1101F PT FALLS ASSESS-DOCD LE1/YR: CPT | Mod: CPTII,S$GLB,, | Performed by: NURSE PRACTITIONER

## 2023-01-12 PROCEDURE — 99999 PR PBB SHADOW E&M-EST. PATIENT-LVL V: ICD-10-PCS | Mod: PBBFAC,,, | Performed by: NURSE PRACTITIONER

## 2023-01-12 PROCEDURE — 1159F PR MEDICATION LIST DOCUMENTED IN MEDICAL RECORD: ICD-10-PCS | Mod: CPTII,S$GLB,, | Performed by: NURSE PRACTITIONER

## 2023-01-12 PROCEDURE — 3074F PR MOST RECENT SYSTOLIC BLOOD PRESSURE < 130 MM HG: ICD-10-PCS | Mod: CPTII,S$GLB,, | Performed by: NURSE PRACTITIONER

## 2023-01-12 PROCEDURE — G0439 PPPS, SUBSEQ VISIT: HCPCS | Mod: S$GLB,,, | Performed by: NURSE PRACTITIONER

## 2023-01-12 PROCEDURE — 1125F PR PAIN SEVERITY QUANTIFIED, PAIN PRESENT: ICD-10-PCS | Mod: CPTII,S$GLB,, | Performed by: NURSE PRACTITIONER

## 2023-01-12 PROCEDURE — 1160F RVW MEDS BY RX/DR IN RCRD: CPT | Mod: CPTII,S$GLB,, | Performed by: NURSE PRACTITIONER

## 2023-01-12 PROCEDURE — 1170F PR FUNCTIONAL STATUS ASSESSED: ICD-10-PCS | Mod: CPTII,S$GLB,, | Performed by: NURSE PRACTITIONER

## 2023-01-12 PROCEDURE — 1159F MED LIST DOCD IN RCRD: CPT | Mod: CPTII,S$GLB,, | Performed by: NURSE PRACTITIONER

## 2023-01-12 PROCEDURE — 3288F PR FALLS RISK ASSESSMENT DOCUMENTED: ICD-10-PCS | Mod: CPTII,S$GLB,, | Performed by: NURSE PRACTITIONER

## 2023-01-12 PROCEDURE — G0439 PR MEDICARE ANNUAL WELLNESS SUBSEQUENT VISIT: ICD-10-PCS | Mod: S$GLB,,, | Performed by: NURSE PRACTITIONER

## 2023-01-12 RX ORDER — ZINC GLUCONATE 50 MG
1 TABLET ORAL 2 TIMES DAILY
COMMUNITY
Start: 2022-11-03 | End: 2023-10-02

## 2023-01-12 RX ORDER — IBUPROFEN 100 MG/5ML
SUSPENSION, ORAL (FINAL DOSE FORM) ORAL
COMMUNITY
Start: 2022-11-03

## 2023-01-12 NOTE — PATIENT INSTRUCTIONS
Counseling and Referral of Other Preventative  (Italic type indicates deductible and co-insurance are waived)    Patient Name: Alejo Melvin  Today's Date: 1/12/2023    Health Maintenance       Date Due Completion Date    Shingles Vaccine (1 of 2) Never done ---    Hemoglobin A1c (Prediabetes) 03/30/2022 3/30/2021    Pneumococcal Vaccines (Age 65+) (2 - PPSV23 if available, else PCV20) 04/11/2023 4/11/2022    Lipid Panel 10/10/2023 10/10/2022    TETANUS VACCINE 04/07/2027 4/7/2017 (Declined)    Override on 4/7/2017: Declined        No orders of the defined types were placed in this encounter.      The following information is provided to all patients.  This information is to help you find resources for any of the problems found today that may be affecting your health:                Living healthy guide: www.[facility].[place].gov      Understanding Diabetes: www.diabetes.org      Eating healthy: www.cdc.gov/healthyweight      [facility] home safety checklist: www.cdc.gov/steadi/patient.html      Agency on Aging: www.goea.[place].[facility]      Alcoholics anonymous (AA): www.aa.org      Physical Activity: www.ar.nih.gov/bk2ajet      Tobacco use: www.quitwithusla.org

## 2023-01-12 NOTE — Clinical Note
Discussed 2nd pneumonia- due 4/2023 for pneumo 20 Also due for Tdap next year 2024 Given rx for shingrix vaccine

## 2023-01-12 NOTE — PROGRESS NOTES
Alejo Melvin presented for a  Medicare AWV and comprehensive Health Risk Assessment today. The following components were reviewed and updated:    Medical history  Family History  Social history  Allergies and Current Medications  Health Risk Assessment  Health Maintenance  Care Team         ** See Completed Assessments for Annual Wellness Visit within the encounter summary.**         The following assessments were completed:  Living Situation  CAGE  Depression Screening  Timed Get Up and Go  Whisper Test  Cognitive Function Screening  Nutrition Screening  ADL Screening  PAQ Screening        Vitals:    01/12/23 0949   BP: 122/64   BP Location: Right arm   Patient Position: Sitting   BP Method: Large (Manual)   Pulse: 68   Resp: 16   SpO2: 98%   Weight: 127.7 kg (281 lb 8.4 oz)   Height: 6' (1.829 m)       Body mass index is 38.18 kg/m².  Physical Exam  Vitals and nursing note reviewed.   Constitutional:       Appearance: Normal appearance. He is obese.   HENT:      Head: Normocephalic and atraumatic.   Cardiovascular:      Rate and Rhythm: Normal rate and regular rhythm.      Heart sounds: No murmur heard.  Pulmonary:      Effort: Pulmonary effort is normal. No respiratory distress.      Breath sounds: Normal breath sounds. No wheezing or rales.   Abdominal:      Palpations: Abdomen is soft.   Musculoskeletal:         General: No swelling. Normal range of motion.   Skin:     General: Skin is warm and dry.      Capillary Refill: Capillary refill takes less than 2 seconds.      Comments: Small bruise noted to left arm- see photo   Neurological:      General: No focal deficit present.      Mental Status: He is alert and oriented to person, place, and time.   Psychiatric:         Mood and Affect: Mood normal.         Behavior: Behavior normal.         Thought Content: Thought content normal.         Judgment: Judgment normal.             Diagnoses and health risks identified today and associated  recommendations/orders:    1. Encounter for preventive health examination  Discussed 2nd pneumonia- due 4/2023 for pneumo 20  Also due for Tdap next year 2024  Given rx for shingrix vaccine    2. Encounter for Medicare annual wellness exam  done  - Ambulatory Referral/Consult to Enhanced Annual Wellness Visit (eAWV)    3. Prostate cancer  Noted on biopsy-0 treated by Dr Godinez urologist in [facility] every 6 months and on proscar     4. Class 2 severe obesity due to excess calories with serious comorbidity and body mass index (BMI) of 36.0 to 36.9 in adult  Encouraged weight loss through diet and exercise     5. Polyneuropathy  EMG done with neurology- follows with Dr Hayes- bilateral lower ext     6. Pacemaker  Noted on EKG- follows with Dr Garcia.     7. Primary hypertension  Well controlled- follows with Dr Garcia   On norvasc/lasix and losartan    8. Mixed hyperlipidemia  Follows with Dr Garcia- well controlled in zetia and crestor     9. Primary osteoarthritis of left knee  Left was replaced- sore also needs right had injection. Follows with ortho as needed     10. MARYLU on CPAP  Uses cpap- followed by pcp - faithful with machine seems well controlled     Pt does not use chronic narcotics. Reviewed  and has not has more than a few days supply since 7/15/22     Provided Alejo with a 5-10 year written screening schedule and personal prevention plan. Recommendations were developed using the USPSTF age appropriate recommendations. Education, counseling, and referrals were provided as needed. After Visit Summary printed and given to patient which includes a list of additional screenings\tests needed.    No follow-ups on file.    Lily Mejia NP    I offered to discuss advanced care planning, including how to pick a person who would make decisions for you if you were unable to make them for yourself, called a health care power of , and what kind of decisions you might make such as use of life  sustaining treatments such as ventilators and tube feeding when faced with a life limiting illness recorded on a living will that they will need to know. (How you want to be cared for as you near the end of your natural life)     X Patient is interested in learning more about how to make advanced directives.  I provided them paperwork and offered to discuss this with them. He has a fiance that he would like made his anne marie choice. Also has 5 children but knows who he wants making decisions. He is working with a  now to get his paperwork straight. He will take this packet and discuss with . Also discussed LW- He will bring copies back when he has f/u with PCP

## 2023-02-27 ENCOUNTER — PATIENT OUTREACH (OUTPATIENT)
Dept: ADMINISTRATIVE | Facility: HOSPITAL | Age: 78
End: 2023-02-27
Payer: MEDICARE

## 2023-02-27 ENCOUNTER — TELEPHONE (OUTPATIENT)
Dept: FAMILY MEDICINE | Facility: CLINIC | Age: 78
End: 2023-02-27
Payer: MEDICARE

## 2023-02-27 DIAGNOSIS — Z12.11 SCREENING FOR COLON CANCER: Primary | ICD-10-CM

## 2023-02-27 DIAGNOSIS — K63.5 POLYP OF COLON, UNSPECIFIED PART OF COLON, UNSPECIFIED TYPE: ICD-10-CM

## 2023-02-27 DIAGNOSIS — Z13.1 SCREENING FOR DIABETES MELLITUS (DM): Primary | ICD-10-CM

## 2023-02-27 NOTE — PROGRESS NOTES
Chart reviewed, immunization record updated.  No new results noted on [facility] web site.  Uploaded Lipid Panel collected on 11/01/2022 from [facility], updated to .  Care Everywhere updated.   Patient care coordination note  Upcoming PCP visit updated.  Next PCP visit 4/10/2023.  Unable to order screening Hemoglobin A1C due to insurance requiring waiver.  Spoke to patient, scheduled Annual Wellness Visit with Cristina Kim NP on 4/13/2023 at 1:30 pm.

## 2023-02-27 NOTE — TELEPHONE ENCOUNTER
----- Message from Vivian Cazares sent at 2023 10:43 AM CST -----  Contact: self  Alejo Melvin  MRN: [id number]  : 1945  PCP: Leroy Alston  Home Phone      [phone]  Work Phone      Not on file.  Mobile          [phone]      MESSAGE:   Pt called stating he is needing a colonoscopy before he starts radiation treatment.     Phone:  [phone]

## 2023-03-22 ENCOUNTER — ANESTHESIA EVENT (OUTPATIENT)
Dept: ENDOSCOPY | Facility: HOSPITAL | Age: 78
End: 2023-03-22
Payer: MEDICARE

## 2023-03-23 ENCOUNTER — HOSPITAL ENCOUNTER (OUTPATIENT)
Dept: PREADMISSION TESTING | Facility: HOSPITAL | Age: 78
Discharge: HOME | End: 2023-03-23
Attending: FAMILY MEDICINE
Payer: MEDICARE

## 2023-03-27 ENCOUNTER — ANESTHESIA (OUTPATIENT)
Dept: ENDOSCOPY | Facility: HOSPITAL | Age: 78
End: 2023-03-27
Payer: MEDICARE

## 2023-03-27 ENCOUNTER — HOSPITAL ENCOUNTER (OUTPATIENT)
Facility: HOSPITAL | Age: 78
Discharge: HOME | End: 2023-03-27
Attending: FAMILY MEDICINE | Admitting: FAMILY MEDICINE
Payer: MEDICARE

## 2023-03-27 VITALS
DIASTOLIC BLOOD PRESSURE: 71 MMHG | OXYGEN SATURATION: 98 % | SYSTOLIC BLOOD PRESSURE: 116 MMHG | RESPIRATION RATE: 18 BRPM | HEART RATE: 61 BPM | TEMPERATURE: 98 F

## 2023-03-27 DIAGNOSIS — Z12.11 COLON CANCER SCREENING: ICD-10-CM

## 2023-03-27 DIAGNOSIS — K63.5 POLYP OF ASCENDING COLON, UNSPECIFIED TYPE: Primary | ICD-10-CM

## 2023-03-27 PROCEDURE — 63600175 PHARM REV CODE 636 W HCPCS: Performed by: NURSE ANESTHETIST, CERTIFIED REGISTERED

## 2023-03-27 PROCEDURE — 37000009 HC ANESTHESIA EA ADD 15 MINS: Performed by: FAMILY MEDICINE

## 2023-03-27 PROCEDURE — 00811 ANES LWR INTST NDSC NOS: CPT | Mod: QZ,P3,PT | Performed by: NURSE ANESTHETIST, CERTIFIED REGISTERED

## 2023-03-27 PROCEDURE — 45380 COLONOSCOPY AND BIOPSY: CPT | Mod: PT | Performed by: FAMILY MEDICINE

## 2023-03-27 PROCEDURE — 37000008 HC ANESTHESIA 1ST 15 MINUTES: Performed by: FAMILY MEDICINE

## 2023-03-27 PROCEDURE — 25000003 PHARM REV CODE 250: Performed by: NURSE ANESTHETIST, CERTIFIED REGISTERED

## 2023-03-27 PROCEDURE — 45380 PR COLONOSCOPY,BIOPSY: ICD-10-PCS | Mod: PT,,, | Performed by: FAMILY MEDICINE

## 2023-03-27 PROCEDURE — 88305 TISSUE EXAM BY PATHOLOGIST: CPT | Performed by: PATHOLOGY

## 2023-03-27 PROCEDURE — 88305 TISSUE EXAM BY PATHOLOGIST: CPT | Mod: 26,,, | Performed by: PATHOLOGY

## 2023-03-27 PROCEDURE — 63600175 PHARM REV CODE 636 W HCPCS: Performed by: FAMILY MEDICINE

## 2023-03-27 PROCEDURE — 45380 COLONOSCOPY AND BIOPSY: CPT | Mod: PT,,, | Performed by: FAMILY MEDICINE

## 2023-03-27 PROCEDURE — D9220AH HC ANESTHESIA PROFESSIONAL FEE: Mod: QZ,P3,PT | Performed by: NURSE ANESTHETIST, CERTIFIED REGISTERED

## 2023-03-27 PROCEDURE — 27201012 HC FORCEPS, HOT/COLD, DISP: Performed by: FAMILY MEDICINE

## 2023-03-27 PROCEDURE — 88305 TISSUE EXAM BY PATHOLOGIST: ICD-10-PCS | Mod: 26,,, | Performed by: PATHOLOGY

## 2023-03-27 RX ORDER — SODIUM CHLORIDE, SODIUM LACTATE, POTASSIUM CHLORIDE, CALCIUM CHLORIDE 600; 310; 30; 20 MG/100ML; MG/100ML; MG/100ML; MG/100ML
INJECTION, SOLUTION INTRAVENOUS CONTINUOUS
Status: DISCONTINUED | OUTPATIENT
Start: 2023-03-27 | End: 2023-03-27

## 2023-03-27 RX ORDER — LIDOCAINE HYDROCHLORIDE 20 MG/ML
INJECTION, SOLUTION EPIDURAL; INFILTRATION; INTRACAUDAL; PERINEURAL
Status: DISCONTINUED | OUTPATIENT
Start: 2023-03-27 | End: 2023-03-27

## 2023-03-27 RX ORDER — SODIUM CHLORIDE 0.9 % (FLUSH) 0.9 %
10 SYRINGE (ML) INJECTION
Status: DISCONTINUED | OUTPATIENT
Start: 2023-03-27 | End: 2023-03-27 | Stop reason: HOSPADM

## 2023-03-27 RX ORDER — SODIUM CHLORIDE, SODIUM LACTATE, POTASSIUM CHLORIDE, CALCIUM CHLORIDE 600; 310; 30; 20 MG/100ML; MG/100ML; MG/100ML; MG/100ML
INJECTION, SOLUTION INTRAVENOUS CONTINUOUS
Status: DISCONTINUED | OUTPATIENT
Start: 2023-03-27 | End: 2023-03-27 | Stop reason: HOSPADM

## 2023-03-27 RX ORDER — PROPOFOL 10 MG/ML
VIAL (ML) INTRAVENOUS
Status: DISCONTINUED | OUTPATIENT
Start: 2023-03-27 | End: 2023-03-27

## 2023-03-27 RX ADMIN — SODIUM CHLORIDE, SODIUM LACTATE, POTASSIUM CHLORIDE, AND CALCIUM CHLORIDE: .6; .31; .03; .02 INJECTION, SOLUTION INTRAVENOUS at 07:03

## 2023-03-27 RX ADMIN — PROPOFOL 140 MG: 10 INJECTION, EMULSION INTRAVENOUS at 07:03

## 2023-03-27 RX ADMIN — LIDOCAINE HYDROCHLORIDE 50 MG: 20 INJECTION, SOLUTION EPIDURAL; INFILTRATION; INTRACAUDAL; PERINEURAL at 07:03

## 2023-03-27 RX ADMIN — PROPOFOL 50 MG: 10 INJECTION, EMULSION INTRAVENOUS at 07:03

## 2023-03-27 NOTE — DISCHARGE SUMMARY
[facility] - Endoscopy  Colorectal Surgery  Discharge Summary      Patient Name: Alejo Melvin  MRN: [id number]  Admission Date: 3/27/2023  Hospital Length of Stay: 0 days  Discharge Date and Time:  03/27/2023 7:52 AM  Attending Physician: Leroy Alston MD   Discharging Provider: Leroy Alston MD  Primary Care Provider: Leroy Alston MD     HPI:  Patient here for colonoscopy.  He underwent bowel prep and is ready for the procedure.  All questions answered.      Procedure(s) (LRB):  COLONOSCOPY (N/A)     Hospital Course:  Patient underwent colonoscopy.  No complications.  One small polyp removed.  Patient recovered and is ready for discharge      Goals of Care Treatment Preferences:  Code Status: Full Code          Significant Diagnostic Studies: Endoscopy: Colonoscopy:  Biopsy of polyp in ascending colon    Pending Diagnostic Studies:     Procedure Component Value Units Date/Time    Specimen to Pathology, Surgery Other [[id number]] Collected: 03/27/23 0735    Order Status: Sent Lab Status: In process Updated: 03/27/23 0735        Final Active Diagnoses:    Diagnosis Date Noted POA    PRINCIPAL PROBLEM:  Colon cancer screening [Z12.11] 02/25/2014 Not Applicable    Polyp of ascending colon [K63.5] 02/25/2014 Yes      Problems Resolved During this Admission:      Discharged Condition: good    Disposition: Home or Self Care    Follow Up:    Patient Instructions:      Diet general     Call MD for:  temperature >100.4     Call MD for:  persistent nausea and vomiting     Call MD for:  severe uncontrolled pain     Call MD for:  difficulty breathing, headache or visual disturbances     Call MD for:  hives     Call MD for:  persistent dizziness or light-headedness     Call MD for:  extreme fatigue     Medications:  Reconciled Home Medications:      Medication List      CONTINUE taking these medications    amLODIPine 5 MG tablet  Commonly known as: NORVASC  Take 1 tablet (5 mg total) by mouth once  daily.     ascorbic acid (vitamin C) 1000 MG tablet  Commonly known as: VITAMIN C  Take 2 tablets orally once in the morning.     aspirin 81 MG EC tablet  Commonly known as: ECOTRIN  Take 1 tablet (81 mg total) by mouth 2 (two) times daily.     b complex vitamins capsule  Take 1 capsule by mouth once daily.     cholecalciferol (vitamin D3) 50 mcg (2,000 unit) Cap capsule  Commonly known as: VITAMIN D3  Take 1 capsule by mouth once daily.     coQ10 (liposomal ubiquinol) 100 mg/mL Liqd     ezetimibe 10 mg tablet  Commonly known as: ZETIA  Take 10 mg by mouth once daily.     finasteride 5 mg tablet  Commonly known as: PROSCAR  Take 5 mg by mouth once daily.     fluticasone propionate 50 mcg/actuation nasal spray  Commonly known as: FLONASE  2 sprays (100 mcg total) by Each Nostril route daily as needed for Rhinitis.     furosemide 40 MG tablet  Commonly known as: LASIX  Take 1 tablet (40 mg total) by mouth 2 (two) times daily.     GLUCOS CHOND CPLX ADVANCED ORAL     leuprolide acetate (6 month) 45 mg injection  Commonly known as: ELIGARD  Inject 45 mg into the skin every 6 (six) months.     losartan 50 MG tablet  Commonly known as: COZAAR  Take 1 tablet (50 mg total) by mouth once daily.     montelukast 10 mg tablet  Commonly known as: SINGULAIR  TAKE ONE TABLET BY MOUTH EVERY EVENING     omega 3-dha-epa-fish oil 300-1,000 mg Cap     potassium chloride SA 20 MEQ tablet  Commonly known as: K-DUR,KLOR-CON  TAKE ONE TABLET BY MOUTH ONCE DAILY     PROAIR HFA 90 mcg/actuation inhaler  Generic drug: albuterol  Inhale 2 puffs into the lungs every 6 (six) hours as needed for Wheezing. Rescue     rosuvastatin 40 MG Tab  Commonly known as: CRESTOR  Take 40 mg by mouth once daily.     sildenafil 20 mg Tab  Commonly known as: REVATIO  3 to 5 po daily 1 hour before relations prn ED     UNABLE TO FIND  medication name: curamin     zinc 50 mg Tab     zinc gluconate 50 mg tablet  Take 1 tablet by mouth 2 (two) times daily.             Leroy Alston MD  Colorectal Surgery  [facility]

## 2023-03-27 NOTE — ANESTHESIA POSTPROCEDURE EVALUATION
Anesthesia Post Evaluation    Patient: Alejo Melvin    Procedure(s) Performed: Procedure(s) (LRB):  COLONOSCOPY (N/A)    Final Anesthesia Type: general      Patient location during evaluation: PACU  Patient participation: Yes- Able to Participate  Level of consciousness: awake and alert, oriented and awake  Post-procedure vital signs: reviewed and stable  Pain management: adequate  Airway patency: patent    PONV status at discharge: No PONV  Anesthetic complications: no      Cardiovascular status: blood pressure returned to baseline  Respiratory status: unassisted, spontaneous ventilation and room air  Hydration status: euvolemic  Follow-up not needed.  Comments: [facility]          Vitals Value Taken Time   /65 03/27/23 0745   Temp 36.5 °C (97.7 °F) 03/27/23 0735   Pulse 62 03/27/23 0745   Resp 18 03/27/23 0745   SpO2 97 % 03/27/23 0745         No case tracking events are documented in the log.      Pain/Joseph Score: Joseph Score: 10 (3/27/2023  7:50 AM)

## 2023-03-27 NOTE — TRANSFER OF CARE
Anesthesia Transfer of Care Note    Patient: Alejo Melvin    Procedure(s) Performed: Procedure(s) (LRB):  COLONOSCOPY (N/A)    Patient location: PACU    Anesthesia Type: general    Transport from OR: Transported from OR on 6-10 L/min O2 by face mask with adequate spontaneous ventilation    Post pain: adequate analgesia    Post assessment: no apparent anesthetic complications and tolerated procedure well    Post vital signs: stable    Level of consciousness: sedated    Nausea/Vomiting: no nausea/vomiting    Complications: none    Transfer of care protocol was followed      Last vitals:   Visit Vitals  /65   Pulse 62   Temp 36.5 °C (97.7 °F) (Skin)   Resp 18   SpO2 97%

## 2023-03-27 NOTE — ANESTHESIA PREPROCEDURE EVALUATION
"Pre-operative evaluation for COLONOSCOPY (N/A)    Patient Active Problem List   Diagnosis    Hypertension    Hyperlipidemia    AR (allergic rhinitis)    Colon polyp    Diverticulosis    Primary osteoarthritis of left knee    Nasal polyp    Other seasonal allergic rhinitis    MARYLU on CPAP    Polyneuropathy    Pacemaker    Elevated PSA, less than 10 ng/ml    Class 2 severe obesity due to excess calories with serious comorbidity and body mass index (BMI) of 36.0 to 36.9 in adult    Prostate cancer        Past Surgical History:   Procedure Laterality Date    CARDIAC PACEMAKER PLACEMENT  02/01/2015    COLONOSCOPY      Lt knee      left knee torn skin and damaged cartilage    PROSTATE BIOPSY      rt heel      Achilles tendon repair    TOTAL KNEE ARTHROPLASTY Left 11/11/2021    Procedure: ARTHROPLASTY, KNEE, TOTAL-LIANNE;  Surgeon: Brian Hunt MD;  [facility];  Service: Orthopedics;  Laterality: Left;     CBC:     No results for input(s): WBC, HCT, PLT in the last 720 hours.    Invalid input(s):  HGB    CMP:   No results for input(s): K, GLU in the last 720 hours.    Invalid input(s):  CREATININE    INR:  No results for input(s): PT, INR, PROTIME, APTT in the last 720 hours.    EKG:   Results for orders placed or performed during the hospital encounter of 11/01/21   EKG 12-lead    Collection Time: 11/01/21 11:51 AM    Narrative    Test Reason : I10,M79.605,    Vent. Rate : 069 BPM     Atrial Rate : 069 BPM     P-R Int : 140 ms          QRS Dur : 172 ms      QT Int : 444 ms       P-R-T Axes : 088 -33 104 degrees     QTc Int : 475 ms    AV sequential or dual chamber electronic pacemaker  Abnormal ECG  No previous ECGs available  Confirmed by Carlos Gbison MD ([id number]) on 11/1/2021 12:09:47 PM    Referred By: RHONDA LEOPOLD           Confirmed By:Carlos Gibson MD      Per Outside Cards note 11/05/2021  "No stress-induced ischemia May 2020"  "Hypertensive heart disease, left ventricular " "ejection fraction 60% 2015"  "Status post pacemaker for AV block"        Pre-op Assessment    I have reviewed the Patient Summary Reports.    I have reviewed the Nursing Notes. I have reviewed the NPO Status.   I have reviewed the Medications.     Review of Systems  Anesthesia Hx:  No problems with previous Anesthesia  History of prior surgery of interest to airway management or planning: Denies Family Hx of Anesthesia complications.   Denies Personal Hx of Anesthesia complications.   Social:  Social Alcohol Use, Non-Smoker    Hematology/Oncology:  Hematology Normal   Oncology Normal     EENT/Dental:EENT/Dental Normal   Cardiovascular:   Exercise tolerance: good Pacemaker Hypertension Denies Valvular problems/Murmurs.  Denies MI. CAD   Dysrhythmias   Denies Angina.  Denies CHF.  Denies Orthopnea.  Functional Capacity 4 METS  Disorder of Cardiac Conduction, A-V Block    Pulmonary:   Denies COPD.  Denies Asthma. Sleep Apnea    Renal/:   Denies Chronic Renal Disease.     Hepatic/GI:   Denies GERD.    Musculoskeletal:   Arthritis     Neurological:   Denies TIA. Denies CVA. Neuromuscular Disease,  Denies Seizures.   Peripheral Neuropathy    Endocrine:   Denies Diabetes.    Dermatological:  Skin Normal    Psych:  Psychiatric Normal   Psychotic Disorder and Bipolar disorder.          Physical Exam  General:  Morbid Obesity, Well nourished, Cooperative, Alert and Oriented      Airway/Jaw/Neck:  Airway Findings: Mouth Opening: Normal   Tongue: Normal   General Airway Assessment: Adult Mallampati: II  TM Distance: Normal, at least 6 cm   Jaw/Neck Findings:  Neck ROM: Normal ROM        Chest/Lungs:  Chest/Lungs Findings: Clear to auscultation      Heart/Vascular:  Heart Findings: Rate: Normal  Rhythm: Regular Rhythm  Sounds: Normal  Heart murmur: negative            Anesthesia Plan  Type of Anesthesia, risks & benefits discussed:  Anesthesia Type:  Gen Natural Airway    Patient's Preference:   Plan Factors:          Intra-op " Monitoring Plan: standard ASA monitors  Intra-op Monitoring Plan Comments:   Post Op Pain Control Plan: multimodal analgesia  Post Op Pain Control Plan Comments:     Induction:   IV  Beta Blocker:  Patient is not currently on a Beta-Blocker (No further documentation required).       Informed Consent: Informed consent signed with the Patient and all parties understand the risks and agree with anesthesia plan.  All questions answered.  Anesthesia consent signed with patient.  ASA Score: 3     Day of Surgery Review of History & Physical: I have interviewed and examined the patient. I have reviewed the patient's H&P dated: 3/27/23.    H&P Update referred to the surgeon/provider.          Ready For Surgery From Anesthesia Perspective.           Physical Exam  General: Morbid Obesity, Well nourished, Cooperative, Alert and Oriented    Airway:  Mallampati: II   Mouth Opening: Normal  TM Distance: Normal, at least 6 cm  Tongue: Normal  Neck ROM: Normal ROM    Chest/Lungs:  Clear to auscultation    Heart:  Rate: Normal  Rhythm: Regular Rhythm  Sounds: Normal          Anesthesia Plan  Type of Anesthesia, risks & benefits discussed:    Anesthesia Type: Gen Natural Airway  Intra-op Monitoring Plan: standard ASA monitors  Post Op Pain Control Plan: multimodal analgesia  Induction:  IV  Informed Consent: Informed consent signed with the Patient and all parties understand the risks and agree with anesthesia plan.  All questions answered.   ASA Score: 3  Day of Surgery Review of History & Physical: H&P Update referred to the surgeon/provider.I have interviewed and examined the patient. I have reviewed the patient's H&P dated: 3/27/23. There are no significant changes.     Ready For Surgery From Anesthesia Perspective.       .

## 2023-03-27 NOTE — H&P
[place] - Endoscopy  Colorectal Surgery  History & Physical    Patient Name: Alejo Melvin  MRN: [id number]  Admission Date: 3/27/2023  Attending Physician: Leroy Alston MD   Primary Care Provider: Leroy Alston MD    Subjective:     Chief Complaint/Reason for Admission:  Colonoscopy    History of Present Illness:  Patient here for colonoscopy.  He underwent bowel prep and is ready for the procedure.  All questions answered.      PTA Medications   Medication Sig    amLODIPine (NORVASC) 5 MG tablet Take 1 tablet (5 mg total) by mouth once daily.    ascorbic acid, vitamin C, (VITAMIN C) 1000 MG tablet Take 2 tablets orally once in the morning.    aspirin (ECOTRIN) 81 MG EC tablet Take 1 tablet (81 mg total) by mouth 2 (two) times daily.    b complex vitamins capsule Take 1 capsule by mouth once daily.    cholecalciferol, vitamin D3, (VITAMIN D3) 50 mcg (2,000 unit) Cap Take 1 capsule by mouth once daily.    coQ10, liposomal ubiquinol, 100 mg/mL Liqd     ezetimibe (ZETIA) 10 mg tablet Take 10 mg by mouth once daily.    finasteride (PROSCAR) 5 mg tablet Take 5 mg by mouth once daily.    fluticasone propionate (FLONASE) 50 mcg/actuation nasal spray 2 sprays (100 mcg total) by Each Nostril route daily as needed for Rhinitis.    furosemide (LASIX) 40 MG tablet Take 1 tablet (40 mg total) by mouth 2 (two) times daily.    glucosam/chondro/herb 149/hyal (GLUCOS CHOND CPLX ADVANCED ORAL)     leuprolide acetate, 6 month, (ELIGARD) 45 mg injection Inject 45 mg into the skin every 6 (six) months.    losartan (COZAAR) 50 MG tablet Take 1 tablet (50 mg total) by mouth once daily.    montelukast (SINGULAIR) 10 mg tablet TAKE ONE TABLET BY MOUTH EVERY EVENING    omega 3-dha-epa-fish oil 300-1,000 mg Cap     potassium chloride SA (K-DUR,KLOR-CON) 20 MEQ tablet TAKE ONE TABLET BY MOUTH ONCE DAILY    PROAIR HFA 90 mcg/actuation inhaler Inhale 2 puffs into the lungs every 6 (six) hours as needed for  Wheezing. Rescue    rosuvastatin (CRESTOR) 40 MG Tab Take 40 mg by mouth once daily.    sildenafil (REVATIO) 20 mg Tab 3 to 5 po daily 1 hour before relations prn ED    UNABLE TO FIND medication name: curamin    zinc 50 mg Tab     zinc gluconate 50 mg tablet Take 1 tablet by mouth 2 (two) times daily.       Review of patient's allergies indicates:   Allergen Reactions    No known drug allergies        Past Medical History:   Diagnosis Date    Arthritis     thumbs and knees    Back pain     Basal cell carcinoma (BCC) in situ of skin     Bilateral carpal tunnel syndrome 05/01/2019    Cancer     Hyperlipidemia     Hypertension     Obesity     Obstructive sleep apnea (adult) (pediatric)     Polyneuropathy     left foot    Prostate cancer     Urinary incontinence     lasix causes urge incontenence      Past Surgical History:   Procedure Laterality Date    CARDIAC PACEMAKER PLACEMENT  02/01/2015    COLONOSCOPY      Lt knee      left knee torn skin and damaged cartilage    PROSTATE BIOPSY      rt heel      Achilles tendon repair    TOTAL KNEE ARTHROPLASTY Left 11/11/2021    Procedure: ARTHROPLASTY, KNEE, TOTAL-LIANNE;  Surgeon: Brian Hunt MD;  Location: [facility];  Service: Orthopedics;  Laterality: Left;     Family History       Problem Relation (Age of Onset)    Cancer Father          Tobacco Use    Smoking status: Never    Smokeless tobacco: Never   Substance and Sexual Activity    Alcohol use: Yes     Comment: once per month    Drug use: No    Sexual activity: Yes     Partners: Female     Review of Systems   Constitutional:  Negative for activity change, chills, fatigue, fever and unexpected weight change.   HENT:  Negative for sore throat and trouble swallowing.    Respiratory:  Negative for cough, chest tightness and shortness of breath.    Cardiovascular:  Negative for chest pain and leg swelling.   Gastrointestinal:  Negative for abdominal pain.   Endocrine: Negative for cold  intolerance and heat intolerance.   Genitourinary:  Negative for difficulty urinating.   Musculoskeletal:  Negative for back pain and joint swelling.   Skin:  Negative for rash.   Neurological:  Negative for numbness.   Hematological:  Negative for adenopathy.   Psychiatric/Behavioral:  Negative for decreased concentration.    Objective:     Vital Signs (Most Recent):  Temp: 97.2 °F (36.2 °C) (03/27/23 0639)  Pulse: 87 (03/27/23 0639)  Resp: 18 (03/27/23 0639)  BP: (!) 157/88 (03/27/23 0640)  SpO2: 100 % (03/27/23 0639)   Vital Signs (24h Range):  Temp:  [97.2 °F (36.2 °C)] 97.2 °F (36.2 °C)  Pulse:  [87] 87  Resp:  [18] 18  SpO2:  [100 %] 100 %  BP: (157)/(88) 157/88        There is no height or weight on file to calculate BMI.    Physical Exam  Constitutional:       Appearance: Normal appearance. He is well-developed.   HENT:      Head: Normocephalic and atraumatic.   Eyes:      Conjunctiva/sclera: Conjunctivae normal.   Cardiovascular:      Rate and Rhythm: Normal rate and regular rhythm.      Heart sounds: Normal heart sounds. No murmur heard.    No gallop.   Pulmonary:      Effort: Pulmonary effort is normal.      Breath sounds: Normal breath sounds.   Abdominal:      General: Abdomen is flat.      Tenderness: There is no abdominal tenderness.   Musculoskeletal:         General: Normal range of motion.      Cervical back: Normal range of motion and neck supple.      Right lower leg: No edema.      Left lower leg: No edema.   Skin:     General: Skin is warm and dry.   Neurological:      General: No focal deficit present.      Mental Status: He is alert and oriented to person, place, and time.      Cranial Nerves: No cranial nerve deficit.   Psychiatric:         Mood and Affect: Mood normal.         Behavior: Behavior normal.         Assessment/Plan:     GI  * Colon cancer screening  Proceed with colonoscopy.          Leroy Alston MD  Colorectal Surgery  [place] - Endoscopy

## 2023-03-27 NOTE — HPI
Patient here for colonoscopy.  He underwent bowel prep and is ready for the procedure.  All questions answered.

## 2023-03-27 NOTE — SUBJECTIVE & OBJECTIVE
PTA Medications   Medication Sig    amLODIPine (NORVASC) 5 MG tablet Take 1 tablet (5 mg total) by mouth once daily.    ascorbic acid, vitamin C, (VITAMIN C) 1000 MG tablet Take 2 tablets orally once in the morning.    aspirin (ECOTRIN) 81 MG EC tablet Take 1 tablet (81 mg total) by mouth 2 (two) times daily.    b complex vitamins capsule Take 1 capsule by mouth once daily.    cholecalciferol, vitamin D3, (VITAMIN D3) 50 mcg (2,000 unit) Cap Take 1 capsule by mouth once daily.    coQ10, liposomal ubiquinol, 100 mg/mL Liqd     ezetimibe (ZETIA) 10 mg tablet Take 10 mg by mouth once daily.    finasteride (PROSCAR) 5 mg tablet Take 5 mg by mouth once daily.    fluticasone propionate (FLONASE) 50 mcg/actuation nasal spray 2 sprays (100 mcg total) by Each Nostril route daily as needed for Rhinitis.    furosemide (LASIX) 40 MG tablet Take 1 tablet (40 mg total) by mouth 2 (two) times daily.    glucosam/chondro/herb 149/hyal (GLUCOS CHOND CPLX ADVANCED ORAL)     leuprolide acetate, 6 month, (ELIGARD) 45 mg injection Inject 45 mg into the skin every 6 (six) months.    losartan (COZAAR) 50 MG tablet Take 1 tablet (50 mg total) by mouth once daily.    montelukast (SINGULAIR) 10 mg tablet TAKE ONE TABLET BY MOUTH EVERY EVENING    omega 3-dha-epa-fish oil 300-1,000 mg Cap     potassium chloride SA (K-DUR,KLOR-CON) 20 MEQ tablet TAKE ONE TABLET BY MOUTH ONCE DAILY    PROAIR HFA 90 mcg/actuation inhaler Inhale 2 puffs into the lungs every 6 (six) hours as needed for Wheezing. Rescue    rosuvastatin (CRESTOR) 40 MG Tab Take 40 mg by mouth once daily.    sildenafil (REVATIO) 20 mg Tab 3 to 5 po daily 1 hour before relations prn ED    UNABLE TO FIND medication name: curamin    zinc 50 mg Tab     zinc gluconate 50 mg tablet Take 1 tablet by mouth 2 (two) times daily.       Review of patient's allergies indicates:   Allergen Reactions    No known drug allergies        Past Medical History:   Diagnosis Date    Arthritis     thumbs and  knees    Back pain     Basal cell carcinoma (BCC) in situ of skin     Bilateral carpal tunnel syndrome 05/01/2019    Cancer     Hyperlipidemia     Hypertension     Obesity     Obstructive sleep apnea (adult) (pediatric)     Polyneuropathy     left foot    Prostate cancer     Urinary incontinence     lasix causes urge incontenence      Past Surgical History:   Procedure Laterality Date    CARDIAC PACEMAKER PLACEMENT  02/01/2015    COLONOSCOPY      Lt knee      left knee torn skin and damaged cartilage    PROSTATE BIOPSY      rt heel      Achilles tendon repair    TOTAL KNEE ARTHROPLASTY Left 11/11/2021    Procedure: ARTHROPLASTY, KNEE, TOTAL-LIANNE;  Surgeon: Brian Hunt MD;  Location: [facility];  Service: Orthopedics;  Laterality: Left;     Family History       Problem Relation (Age of Onset)    Cancer Father          Tobacco Use    Smoking status: Never    Smokeless tobacco: Never   Substance and Sexual Activity    Alcohol use: Yes     Comment: once per month    Drug use: No    Sexual activity: Yes     Partners: Female     Review of Systems   Constitutional:  Negative for activity change, chills, fatigue, fever and unexpected weight change.   HENT:  Negative for sore throat and trouble swallowing.    Respiratory:  Negative for cough, chest tightness and shortness of breath.    Cardiovascular:  Negative for chest pain and leg swelling.   Gastrointestinal:  Negative for abdominal pain.   Endocrine: Negative for cold intolerance and heat intolerance.   Genitourinary:  Negative for difficulty urinating.   Musculoskeletal:  Negative for back pain and joint swelling.   Skin:  Negative for rash.   Neurological:  Negative for numbness.   Hematological:  Negative for adenopathy.   Psychiatric/Behavioral:  Negative for decreased concentration.    Objective:     Vital Signs (Most Recent):  Temp: 97.2 °F (36.2 °C) (03/27/23 0639)  Pulse: 87 (03/27/23 0639)  Resp: 18 (03/27/23 0639)  BP: (!) 157/88 (03/27/23 0640)  SpO2:  100 % (03/27/23 0639)   Vital Signs (24h Range):  Temp:  [97.2 °F (36.2 °C)] 97.2 °F (36.2 °C)  Pulse:  [87] 87  Resp:  [18] 18  SpO2:  [100 %] 100 %  BP: (157)/(88) 157/88        There is no height or weight on file to calculate BMI.    Physical Exam  Constitutional:       Appearance: Normal appearance. He is well-developed.   HENT:      Head: Normocephalic and atraumatic.   Eyes:      Conjunctiva/sclera: Conjunctivae normal.   Cardiovascular:      Rate and Rhythm: Normal rate and regular rhythm.      Heart sounds: Normal heart sounds. No murmur heard.    No gallop.   Pulmonary:      Effort: Pulmonary effort is normal.      Breath sounds: Normal breath sounds.   Abdominal:      General: Abdomen is flat.      Tenderness: There is no abdominal tenderness.   Musculoskeletal:         General: Normal range of motion.      Cervical back: Normal range of motion and neck supple.      Right lower leg: No edema.      Left lower leg: No edema.   Skin:     General: Skin is warm and dry.   Neurological:      General: No focal deficit present.      Mental Status: He is alert and oriented to person, place, and time.      Cranial Nerves: No cranial nerve deficit.   Psychiatric:         Mood and Affect: Mood normal.         Behavior: Behavior normal.

## 2023-03-27 NOTE — HOSPITAL COURSE
Patient underwent colonoscopy.  No complications.  One small polyp removed.  Patient recovered and is ready for discharge

## 2023-03-27 NOTE — PROCEDURES
Procedures  03/27/2023    Surgeon:  Leroy Alston MD    Preop diagnosis: Screening for colon cancer [Z12.11]    Postop diagnosis: Polyp ascending colon    Complications: none    EBL: 0    The patient presented to the endoscopy suite for a long colonoscopy. The patient signed the informed consent after undergoing bowel prep night before the procedure. IV fluids were started. The patient was given IV sedation using propofol with good results. Rectal exam was performed. The patient had good sphincter tone, there were notmasses palpable.   Colonoscope was inserted and with air insufflation and direct visualization of the lumen the colonoscope was advanced to the cecum. The cecum was Normal ascending colon was Polyp visualized which was biopsied and completely removed, transverse colon was Normal , descending colon was Normal , sigmoid colon was Normal. The scope was retroflexed in the rectal area reviewing the anal rectal junction.  Hemorrhoid were present  Bleeding: absent Fissures: absent Scope was placed back into neutral position and removed. Patient recovered nicely and was discharged home without any events.     Plan will be to repeat the colonoscopy in 3 years if biopsy returns as an adenoma..

## 2023-03-27 NOTE — DISCHARGE SUMMARY
[place] - Endoscopy  Colorectal Surgery  Discharge Summary      Patient Name: Alejo Melvin  MRN: [id number]  Admission Date: 3/27/2023  Hospital Length of Stay: 0 days  Discharge Date and Time:  03/27/2023 7:51 AM  Attending Physician: Leroy Alston MD   Discharging Provider: Leroy Alston MD  Primary Care Provider: Leroy Alston MD     HPI:  Patient here for colonoscopy.  He underwent bowel prep and is ready for the procedure.  All questions answered.      Procedure(s) (LRB):  COLONOSCOPY (N/A)     Hospital Course:  Patient underwent colonoscopy.  No complications.  One small polyp removed.  Patient recovered and is ready for discharge      Goals of Care Treatment Preferences:  Code Status: Full Code          Significant Diagnostic Studies:  Colonoscopy with biopsy of a polyp    Pending Diagnostic Studies:     Procedure Component Value Units Date/Time    Specimen to Pathology, Surgery Other [[id number]] Collected: 03/27/23 0735    Order Status: Sent Lab Status: In process Updated: 03/27/23 0735        Final Active Diagnoses:    Diagnosis Date Noted POA    PRINCIPAL PROBLEM:  Colon cancer screening [Z12.11] 02/25/2014 Not Applicable    Polyp of ascending colon [K63.5] 02/25/2014 Yes      Problems Resolved During this Admission:      Discharged Condition: good    Disposition: Home or Self Care    Follow Up:    Patient Instructions:      Diet general     Call MD for:  temperature >100.4     Call MD for:  persistent nausea and vomiting     Call MD for:  severe uncontrolled pain     Call MD for:  difficulty breathing, headache or visual disturbances     Call MD for:  hives     Call MD for:  persistent dizziness or light-headedness     Call MD for:  extreme fatigue     Medications:  Reconciled Home Medications:      Medication List      CONTINUE taking these medications    amLODIPine 5 MG tablet  Commonly known as: NORVASC  Take 1 tablet (5 mg total) by mouth once daily.     ascorbic acid  (vitamin C) 1000 MG tablet  Commonly known as: VITAMIN C  Take 2 tablets orally once in the morning.     aspirin 81 MG EC tablet  Commonly known as: ECOTRIN  Take 1 tablet (81 mg total) by mouth 2 (two) times daily.     b complex vitamins capsule  Take 1 capsule by mouth once daily.     cholecalciferol (vitamin D3) 50 mcg (2,000 unit) Cap capsule  Commonly known as: VITAMIN D3  Take 1 capsule by mouth once daily.     coQ10 (liposomal ubiquinol) 100 mg/mL Liqd     ezetimibe 10 mg tablet  Commonly known as: ZETIA  Take 10 mg by mouth once daily.     finasteride 5 mg tablet  Commonly known as: PROSCAR  Take 5 mg by mouth once daily.     fluticasone propionate 50 mcg/actuation nasal spray  Commonly known as: FLONASE  2 sprays (100 mcg total) by Each Nostril route daily as needed for Rhinitis.     furosemide 40 MG tablet  Commonly known as: LASIX  Take 1 tablet (40 mg total) by mouth 2 (two) times daily.     GLUCOS CHOND CPLX ADVANCED ORAL     leuprolide acetate (6 month) 45 mg injection  Commonly known as: ELIGARD  Inject 45 mg into the skin every 6 (six) months.     losartan 50 MG tablet  Commonly known as: COZAAR  Take 1 tablet (50 mg total) by mouth once daily.     montelukast 10 mg tablet  Commonly known as: SINGULAIR  TAKE ONE TABLET BY MOUTH EVERY EVENING     omega 3-dha-epa-fish oil 300-1,000 mg Cap     potassium chloride SA 20 MEQ tablet  Commonly known as: K-DUR,KLOR-CON  TAKE ONE TABLET BY MOUTH ONCE DAILY     PROAIR HFA 90 mcg/actuation inhaler  Generic drug: albuterol  Inhale 2 puffs into the lungs every 6 (six) hours as needed for Wheezing. Rescue     rosuvastatin 40 MG Tab  Commonly known as: CRESTOR  Take 40 mg by mouth once daily.     sildenafil 20 mg Tab  Commonly known as: REVATIO  3 to 5 po daily 1 hour before relations prn ED     UNABLE TO FIND  medication name: curamin     zinc 50 mg Tab     zinc gluconate 50 mg tablet  Take 1 tablet by mouth 2 (two) times daily.            Leroy Alston,  MD  Colorectal Surgery  [facility]

## 2023-03-31 LAB
FINAL PATHOLOGIC DIAGNOSIS: NORMAL
Lab: NORMAL

## 2023-04-04 NOTE — PROGRESS NOTES
Tell patient that biopsy came back tubular adenoma, no cancer.  Recommendation is to repeat colonoscopy in 3 years

## 2023-04-10 ENCOUNTER — OFFICE VISIT (OUTPATIENT)
Dept: FAMILY MEDICINE | Facility: CLINIC | Age: 78
End: 2023-04-10
Payer: MEDICARE

## 2023-04-10 VITALS
OXYGEN SATURATION: 96 % | HEIGHT: 72 IN | WEIGHT: 279.31 LBS | HEART RATE: 68 BPM | SYSTOLIC BLOOD PRESSURE: 105 MMHG | BODY MASS INDEX: 37.83 KG/M2 | DIASTOLIC BLOOD PRESSURE: 68 MMHG

## 2023-04-10 DIAGNOSIS — I10 PRIMARY HYPERTENSION: ICD-10-CM

## 2023-04-10 DIAGNOSIS — Z23 IMMUNIZATION DUE: ICD-10-CM

## 2023-04-10 DIAGNOSIS — C61 PROSTATE CANCER: Primary | ICD-10-CM

## 2023-04-10 DIAGNOSIS — D12.2 ADENOMATOUS POLYP OF ASCENDING COLON: ICD-10-CM

## 2023-04-10 DIAGNOSIS — Z95.0 PACEMAKER: ICD-10-CM

## 2023-04-10 DIAGNOSIS — G47.33 OSA ON CPAP: ICD-10-CM

## 2023-04-10 DIAGNOSIS — E78.2 MIXED HYPERLIPIDEMIA: ICD-10-CM

## 2023-04-10 PROCEDURE — 1101F PR PT FALLS ASSESS DOC 0-1 FALLS W/OUT INJ PAST YR: ICD-10-PCS | Mod: CPTII,S$GLB,, | Performed by: FAMILY MEDICINE

## 2023-04-10 PROCEDURE — G0009 PNEUMOCOCCAL POLYSACCHARIDE VACCINE 23-VALENT =>2YO SQ IM: ICD-10-PCS | Mod: S$GLB,,, | Performed by: FAMILY MEDICINE

## 2023-04-10 PROCEDURE — 3074F PR MOST RECENT SYSTOLIC BLOOD PRESSURE < 130 MM HG: ICD-10-PCS | Mod: CPTII,S$GLB,, | Performed by: FAMILY MEDICINE

## 2023-04-10 PROCEDURE — 99999 PR PBB SHADOW E&M-EST. PATIENT-LVL V: CPT | Mod: PBBFAC,,, | Performed by: FAMILY MEDICINE

## 2023-04-10 PROCEDURE — 90732 PNEUMOCOCCAL POLYSACCHARIDE VACCINE 23-VALENT =>2YO SQ IM: ICD-10-PCS | Mod: S$GLB,,, | Performed by: FAMILY MEDICINE

## 2023-04-10 PROCEDURE — G0009 ADMIN PNEUMOCOCCAL VACCINE: HCPCS | Mod: S$GLB,,, | Performed by: FAMILY MEDICINE

## 2023-04-10 PROCEDURE — 1160F RVW MEDS BY RX/DR IN RCRD: CPT | Mod: CPTII,S$GLB,, | Performed by: FAMILY MEDICINE

## 2023-04-10 PROCEDURE — 90732 PPSV23 VACC 2 YRS+ SUBQ/IM: CPT | Mod: S$GLB,,, | Performed by: FAMILY MEDICINE

## 2023-04-10 PROCEDURE — 99214 OFFICE O/P EST MOD 30 MIN: CPT | Mod: S$GLB,,, | Performed by: FAMILY MEDICINE

## 2023-04-10 PROCEDURE — 3288F FALL RISK ASSESSMENT DOCD: CPT | Mod: CPTII,S$GLB,, | Performed by: FAMILY MEDICINE

## 2023-04-10 PROCEDURE — 1101F PT FALLS ASSESS-DOCD LE1/YR: CPT | Mod: CPTII,S$GLB,, | Performed by: FAMILY MEDICINE

## 2023-04-10 PROCEDURE — 1160F PR REVIEW ALL MEDS BY PRESCRIBER/CLIN PHARMACIST DOCUMENTED: ICD-10-PCS | Mod: CPTII,S$GLB,, | Performed by: FAMILY MEDICINE

## 2023-04-10 PROCEDURE — 1125F AMNT PAIN NOTED PAIN PRSNT: CPT | Mod: CPTII,S$GLB,, | Performed by: FAMILY MEDICINE

## 2023-04-10 PROCEDURE — 99999 PR PBB SHADOW E&M-EST. PATIENT-LVL V: ICD-10-PCS | Mod: PBBFAC,,, | Performed by: FAMILY MEDICINE

## 2023-04-10 PROCEDURE — 1159F PR MEDICATION LIST DOCUMENTED IN MEDICAL RECORD: ICD-10-PCS | Mod: CPTII,S$GLB,, | Performed by: FAMILY MEDICINE

## 2023-04-10 PROCEDURE — 3078F DIAST BP <80 MM HG: CPT | Mod: CPTII,S$GLB,, | Performed by: FAMILY MEDICINE

## 2023-04-10 PROCEDURE — 3078F PR MOST RECENT DIASTOLIC BLOOD PRESSURE < 80 MM HG: ICD-10-PCS | Mod: CPTII,S$GLB,, | Performed by: FAMILY MEDICINE

## 2023-04-10 PROCEDURE — 99214 PR OFFICE/OUTPT VISIT, EST, LEVL IV, 30-39 MIN: ICD-10-PCS | Mod: S$GLB,,, | Performed by: FAMILY MEDICINE

## 2023-04-10 PROCEDURE — 3288F PR FALLS RISK ASSESSMENT DOCUMENTED: ICD-10-PCS | Mod: CPTII,S$GLB,, | Performed by: FAMILY MEDICINE

## 2023-04-10 PROCEDURE — 1159F MED LIST DOCD IN RCRD: CPT | Mod: CPTII,S$GLB,, | Performed by: FAMILY MEDICINE

## 2023-04-10 PROCEDURE — 3074F SYST BP LT 130 MM HG: CPT | Mod: CPTII,S$GLB,, | Performed by: FAMILY MEDICINE

## 2023-04-10 PROCEDURE — 1125F PR PAIN SEVERITY QUANTIFIED, PAIN PRESENT: ICD-10-PCS | Mod: CPTII,S$GLB,, | Performed by: FAMILY MEDICINE

## 2023-04-10 RX ORDER — FUROSEMIDE 40 MG/1
40 TABLET ORAL 2 TIMES DAILY
Qty: 60 TABLET | Refills: 5 | Status: CANCELLED | OUTPATIENT
Start: 2023-04-10

## 2023-04-10 RX ORDER — AMLODIPINE BESYLATE 5 MG/1
5 TABLET ORAL DAILY
Qty: 30 TABLET | Refills: 5 | Status: CANCELLED | OUTPATIENT
Start: 2023-04-10 | End: 2024-04-09

## 2023-04-10 RX ORDER — LOSARTAN POTASSIUM 50 MG/1
50 TABLET ORAL DAILY
Qty: 30 TABLET | Refills: 5 | Status: CANCELLED | OUTPATIENT
Start: 2023-04-10

## 2023-04-10 RX ORDER — SILDENAFIL CITRATE 20 MG/1
TABLET ORAL
Qty: 30 TABLET | Refills: 4 | Status: CANCELLED
Start: 2023-04-10

## 2023-04-10 NOTE — PROGRESS NOTES
Pt is a 77 y.o. male who presents for check up for   Encounter Diagnoses   Name Primary?    Prostate cancer Yes    Mixed hyperlipidemia     Primary hypertension     Pacemaker     MARYLU on CPAP     Adenomatous polyp of ascending colon     Immunization due    . Doing well on current meds. Denies any side effects. Prevention is not up to date.    HPI: Patient here for a checkup. S/P left knee replacement.  Doing well, but right knee is painful.  Back is hurting.  Patient has obstructive sleep apnea.  He tolerates his CPAP well.  He was diagnosed with Mobitz type II heart block.  He has a pace maker.  Doing well.  His blood pressure is well controlled with Lotrel.  He does not have side effects such as swelling or chronic dry cough.  He checks his blood pressure at home it is usually better than here.  Sees cardiology at this time.  His allergy symptoms are controlled when he is using his fluticasone and patanase.  He saw ENT and they removed polyps which helped a little.    His cholesterol is under good control.  He takes Lipitor 20 mg daily.  He is not having side effects such as flushing or myalgias.    Patient has significant osteoarthritis in his right knee.  He takes glucosamine chondroitin sulfate and Advil.  It is not helping very much.  It is slowing down his ballroom dancing.  He has prostate cancer.  Scheduled for RXT.  Now on harmone shots ([place]aur[place]).  Causing hot flashes.  Had colonoscopy 3/23.  One tubular adenoma present.  Next one due 2026    ROS:   Gen.: No fever, chills, weight loss, weight gain  HEENT: No headaches, sore throat, change in vision  CV: No chest pain  RESP: No shortness of breath, wheezing, cough  GI: No change in bowel habits, no diarrhea, no abdominal pain, no hematochezia  : No dysuria, frequency  MSK: knee pain, arthritis, back pain  NEURO: No numbness, weakness  ENDO: No polyuria, polydipsia, heat or cold intolerance    PMH, PSH, ALLERGIES, SH, FH reviewed  Medications reviewed  nurses notes  Last colonoscopy 2/25/14    PHYSICAL EXAM;   Vitals:    04/10/23 1122   BP: 105/68   Pulse: 68     APPEARANCE: Well nourished, well developed, in no acute distress.   HEENT:  griselda nasal congestion, Clear rhinorrhea, pale boggy nasal mucosa.  Nasal polyps seen.    CHEST: Lungs clear to auscultation.  CARDIOVASCULAR: Normal S1, S2. No rubs, murmurs or gallops.  Irregular rhythm consistent with Mobitz type II heart block  ABDOMEN: Bowel sounds normal. Not distended. Soft. No tenderness or masses.  MUSCULOSKELETAL: Right knee with crepitus, valgus deformity.  No effusions.  No edema.  Left knee s/p TKR.  Back: Moderate tenderness and spasm the lumbar region, negative straight leg raise, DTRs are 2+ and equal in knees and ankles.  Strength in the legs is 5+ equally.  Straight leg raise did cause lumbar pain and mild pain into the leg.    Lab Results   Component Value Date    LDLCALC 178 (A) 11/01/2022     BMP  Lab Results   Component Value Date     10/10/2022    K 3.9 10/10/2022     10/10/2022    CO2 26 10/10/2022    BUN 19 10/10/2022    CREATININE 0.9 10/10/2022    CALCIUM 9.5 10/10/2022    ANIONGAP 10 10/10/2022    ESTGFRAFRICA >60.0 11/01/2021    EGFRNONAA >60.0 11/01/2021     Lab Results   Component Value Date    PSA 10.0 (H) 03/20/2020    PSA 6.8 (H) 04/25/2019     ASSESSMENT/PLAN:    Ar (allergic rhinitis)  - fluticasone (VERAMYST) 27.5 mcg/actuation nasal spray; 2 sprays by Nasal route daily 2 hours after breakfast. 2 Spray, Suspension Nasal Every day.  prn allergies  - olopatadine (PATANASE) 0.6 % Spry; 2 sprays by Nasal route 2 (two) times daily. 2 Spray, Non-Aerosol Nasal Twice a day    1. Prostate cancer  Overview:  Finished harmone therapy  Scheduled for RXT soon      2. Mixed hyperlipidemia  Overview:  Low fat diet.  Avoid sweets.  A 10 pound weight loss by the next visit as a  good goal.  Increase consumption of fruits and vegetables, fish and chicken.  Use medications below:         fish oil-omega-3 fatty acids 300-1,000 mg capsule; Take 2 capsules (2 g total) by mouth once daily.  - Rosuvastatin 40 mg by mouth daily  -     Zetia 10 mg daily        3. Primary hypertension  Overview:  Two gram sodium diet.    Weight loss discussed.    Try to walk 2 miles per day.    Avoid smoking.    Current medications will be:  - Decrease amlodipine to 2.5 mg po daily.  Consider stopping it.        Losartan 50 mg per capsule; Take 1 capsule by mouth once daily.  - aspirin (ECOTRIN) 81 MG EC tablet; Take 1 tablet (81 mg total) by mouth 2 (two) times daily.  -     Lasix 40 mg 2 tabs daily        4. Pacemaker  Overview:  Working well.  Not MRI safe      5. MARYLU on CPAP  Overview:  Doing very well on CPAP      6. Adenomatous polyp of ascending colon  Overview:  Had colonoscopy 3/23.  One tubular adenoma present.  Next one due 2026      7. Immunization due  -     (In Office Administered) Pneumococcal Polysaccharide Vaccine (23 Valent) (SQ/IM)         Next appointment will be in 6 months.

## 2023-04-11 ENCOUNTER — TELEPHONE (OUTPATIENT)
Dept: ADMINISTRATIVE | Facility: CLINIC | Age: 78
End: 2023-04-11
Payer: MEDICARE

## 2023-04-11 PROBLEM — R97.20 ELEVATED PSA, LESS THAN 10 NG/ML: Status: RESOLVED | Noted: 2020-03-20 | Resolved: 2023-04-11

## 2023-10-02 ENCOUNTER — OFFICE VISIT (OUTPATIENT)
Dept: FAMILY MEDICINE | Facility: CLINIC | Age: 78
End: 2023-10-02
Payer: MEDICARE

## 2023-10-02 VITALS
BODY MASS INDEX: 38.42 KG/M2 | RESPIRATION RATE: 16 BRPM | WEIGHT: 283.63 LBS | SYSTOLIC BLOOD PRESSURE: 126 MMHG | OXYGEN SATURATION: 98 % | HEIGHT: 72 IN | HEART RATE: 63 BPM | DIASTOLIC BLOOD PRESSURE: 66 MMHG

## 2023-10-02 DIAGNOSIS — M17.11 PRIMARY OSTEOARTHRITIS OF RIGHT KNEE: ICD-10-CM

## 2023-10-02 DIAGNOSIS — C61 PROSTATE CANCER: ICD-10-CM

## 2023-10-02 DIAGNOSIS — I10 PRIMARY HYPERTENSION: ICD-10-CM

## 2023-10-02 DIAGNOSIS — E78.2 MIXED HYPERLIPIDEMIA: Primary | ICD-10-CM

## 2023-10-02 PROCEDURE — 1100F PTFALLS ASSESS-DOCD GE2>/YR: CPT | Mod: CPTII,S$GLB,, | Performed by: FAMILY MEDICINE

## 2023-10-02 PROCEDURE — 1100F PR PT FALLS ASSESS DOC 2+ FALLS/FALL W/INJURY/YR: ICD-10-PCS | Mod: CPTII,S$GLB,, | Performed by: FAMILY MEDICINE

## 2023-10-02 PROCEDURE — 20610 DRAIN/INJ JOINT/BURSA W/O US: CPT | Mod: RT,S$GLB,, | Performed by: FAMILY MEDICINE

## 2023-10-02 PROCEDURE — 3074F SYST BP LT 130 MM HG: CPT | Mod: CPTII,S$GLB,, | Performed by: FAMILY MEDICINE

## 2023-10-02 PROCEDURE — 20610 PR DRAIN/INJECT LARGE JOINT/BURSA: ICD-10-PCS | Mod: RT,S$GLB,, | Performed by: FAMILY MEDICINE

## 2023-10-02 PROCEDURE — 99214 OFFICE O/P EST MOD 30 MIN: CPT | Mod: 25,S$GLB,, | Performed by: FAMILY MEDICINE

## 2023-10-02 PROCEDURE — 3288F PR FALLS RISK ASSESSMENT DOCUMENTED: ICD-10-PCS | Mod: CPTII,S$GLB,, | Performed by: FAMILY MEDICINE

## 2023-10-02 PROCEDURE — 1159F MED LIST DOCD IN RCRD: CPT | Mod: CPTII,S$GLB,, | Performed by: FAMILY MEDICINE

## 2023-10-02 PROCEDURE — 99999 PR PBB SHADOW E&M-EST. PATIENT-LVL V: ICD-10-PCS | Mod: PBBFAC,,, | Performed by: FAMILY MEDICINE

## 2023-10-02 PROCEDURE — 1125F PR PAIN SEVERITY QUANTIFIED, PAIN PRESENT: ICD-10-PCS | Mod: CPTII,S$GLB,, | Performed by: FAMILY MEDICINE

## 2023-10-02 PROCEDURE — 99214 PR OFFICE/OUTPT VISIT, EST, LEVL IV, 30-39 MIN: ICD-10-PCS | Mod: 25,S$GLB,, | Performed by: FAMILY MEDICINE

## 2023-10-02 PROCEDURE — 1160F RVW MEDS BY RX/DR IN RCRD: CPT | Mod: CPTII,S$GLB,, | Performed by: FAMILY MEDICINE

## 2023-10-02 PROCEDURE — 3288F FALL RISK ASSESSMENT DOCD: CPT | Mod: CPTII,S$GLB,, | Performed by: FAMILY MEDICINE

## 2023-10-02 PROCEDURE — 3074F PR MOST RECENT SYSTOLIC BLOOD PRESSURE < 130 MM HG: ICD-10-PCS | Mod: CPTII,S$GLB,, | Performed by: FAMILY MEDICINE

## 2023-10-02 PROCEDURE — 1160F PR REVIEW ALL MEDS BY PRESCRIBER/CLIN PHARMACIST DOCUMENTED: ICD-10-PCS | Mod: CPTII,S$GLB,, | Performed by: FAMILY MEDICINE

## 2023-10-02 PROCEDURE — 3078F PR MOST RECENT DIASTOLIC BLOOD PRESSURE < 80 MM HG: ICD-10-PCS | Mod: CPTII,S$GLB,, | Performed by: FAMILY MEDICINE

## 2023-10-02 PROCEDURE — 3078F DIAST BP <80 MM HG: CPT | Mod: CPTII,S$GLB,, | Performed by: FAMILY MEDICINE

## 2023-10-02 PROCEDURE — 1159F PR MEDICATION LIST DOCUMENTED IN MEDICAL RECORD: ICD-10-PCS | Mod: CPTII,S$GLB,, | Performed by: FAMILY MEDICINE

## 2023-10-02 PROCEDURE — 1125F AMNT PAIN NOTED PAIN PRSNT: CPT | Mod: CPTII,S$GLB,, | Performed by: FAMILY MEDICINE

## 2023-10-02 PROCEDURE — 99999 PR PBB SHADOW E&M-EST. PATIENT-LVL V: CPT | Mod: PBBFAC,,, | Performed by: FAMILY MEDICINE

## 2023-10-02 RX ORDER — ABIRATERONE ACETATE 250 MG/1
1000 TABLET ORAL NIGHTLY
COMMUNITY
Start: 2023-09-15

## 2023-10-02 RX ORDER — VENLAFAXINE 75 MG/1
75 TABLET ORAL
COMMUNITY
Start: 2023-09-11

## 2023-10-02 RX ORDER — TRIAMCINOLONE ACETONIDE 40 MG/ML
40 INJECTION, SUSPENSION INTRA-ARTICULAR; INTRAMUSCULAR
Status: COMPLETED | OUTPATIENT
Start: 2023-10-02 | End: 2023-10-02

## 2023-10-02 RX ORDER — PREDNISONE 5 MG/1
5 TABLET ORAL 2 TIMES DAILY
COMMUNITY
Start: 2023-09-15

## 2023-10-02 RX ORDER — BUPIVACAINE HYDROCHLORIDE 5 MG/ML
2 INJECTION, SOLUTION PERINEURAL
Status: COMPLETED | OUTPATIENT
Start: 2023-10-02 | End: 2023-10-02

## 2023-10-02 RX ORDER — VALSARTAN 160 MG/1
TABLET ORAL
COMMUNITY
Start: 2023-05-15 | End: 2023-12-08

## 2023-10-02 RX ORDER — TAMSULOSIN HYDROCHLORIDE 0.4 MG/1
1 CAPSULE ORAL EVERY OTHER DAY
COMMUNITY
Start: 2023-09-11

## 2023-10-02 RX ADMIN — TRIAMCINOLONE ACETONIDE 40 MG: 40 INJECTION, SUSPENSION INTRA-ARTICULAR; INTRAMUSCULAR at 03:10

## 2023-10-02 RX ADMIN — BUPIVACAINE HYDROCHLORIDE 10 MG: 5 INJECTION, SOLUTION PERINEURAL at 03:10

## 2023-10-02 NOTE — PROGRESS NOTES
Subjective:       Patient ID: Alejo Melvin is a 78 y.o. male.    Chief Complaint: Knee Pain (Pt here for right knee pain. ), Skin Cancer (Possible skin cancer on left arm. ), Night Sweats (Pt states concern for night sweats. ), and Shoulder Pain (Pt states concern for not being able to raise left shoulder straight up. )    HPI: Patient here for a checkup. S/P left knee replacement.  Doing well, but right knee is painful.  Back is hurting.  Patient has obstructive sleep apnea.  He tolerates his CPAP well.  He was diagnosed with Mobitz type II heart block.  He has a pace maker.  Doing well.  His blood pressure is well controlled with Lotrel.  He does not have side effects such as swelling or chronic dry cough.  He checks his blood pressure at home it is usually better than here.  Sees cardiology at this time.  His allergy symptoms are controlled when he is using his fluticasone and patanase.  He saw ENT and they removed polyps which helped a little.    His cholesterol is under good control.  He takes Lipitor 20 mg daily.  He is not having side effects such as flushing or myalgias.    Patient has significant osteoarthritis in his right knee.  He takes glucosamine chondroitin sulfate and Advil.  It is not helping very much.  It is slowing down his ballroom dancing.  He has prostate cancer.  Scheduled for RXT.  Now on harmone shots (Elogaurd).  Causing hot flashes.  Had colonoscopy 3/23.  One tubular adenoma present.  Next one due 2026      Review of Systems   Constitutional:  Positive for activity change. Negative for unexpected weight change.   HENT:  Positive for hearing loss and rhinorrhea. Negative for trouble swallowing.    Eyes:  Positive for visual disturbance. Negative for discharge.   Respiratory:  Negative for chest tightness and wheezing.    Cardiovascular:  Negative for chest pain and palpitations.   Gastrointestinal:  Negative for blood in stool, constipation, diarrhea and vomiting.   Endocrine:  Negative for polydipsia and polyuria.   Genitourinary:  Negative for difficulty urinating, hematuria and urgency.   Musculoskeletal:  Positive for arthralgias. Negative for joint swelling.   Neurological:  Negative for weakness and headaches.   Psychiatric/Behavioral:  Negative for confusion and dysphoric mood.        Objective:      Vitals:    10/02/23 1442   BP: 126/66   Pulse: 63   Resp: 16     Physical Exam  Constitutional:       Appearance: Normal appearance. He is well-developed.   Cardiovascular:      Rate and Rhythm: Normal rate and regular rhythm.      Heart sounds: Normal heart sounds. No murmur heard.     No gallop.   Pulmonary:      Effort: Pulmonary effort is normal.      Breath sounds: Normal breath sounds.   Musculoskeletal:         General: Tenderness present.      Left shoulder: Decreased range of motion (Mild adhesive capsulitis).      Right knee: No effusion. Normal range of motion. Tenderness present over the medial joint line and lateral joint line. Abnormal alignment.      Instability Tests: Anterior Lachman test negative.      Right lower leg: Deformity and tenderness present. No swelling. No edema.      Left lower leg: No edema.   Neurological:      Mental Status: He is alert.      Deep Tendon Reflexes: Reflexes are normal and symmetric.           Lab Results   Component Value Date    TSH 1.927 04/15/2015       Assessment:       1. Mixed hyperlipidemia    2. Primary hypertension    3. Primary osteoarthritis of right knee    4. Prostate cancer        Plan:   1. Mixed hyperlipidemia  Overview:  Low fat diet.  Avoid sweets.  A 10 pound weight loss by the next visit as a  good goal.  Increase consumption of fruits and vegetables, fish and chicken.  Use medications below:        fish oil-omega-3 fatty acids 300-1,000 mg capsule; Take 2 capsules (2 g total) by mouth once daily.  - Rosuvastatin 40 mg by mouth daily  -     Zetia 10 mg daily        2. Primary hypertension  Overview:  Two gram sodium  diet.    Weight loss discussed.    Try to walk 2 miles per day.    Avoid smoking.    Current medications will be:  - Decrease amlodipine to 2.5 mg po daily.  Consider stopping it.        Losartan 50 mg per capsule; Take 1 capsule by mouth once daily.  - aspirin (ECOTRIN) 81 MG EC tablet; Take 1 tablet (81 mg total) by mouth 2 (two) times daily.  -     Lasix 40 mg 2 tabs daily        3. Primary osteoarthritis of right knee  Overview:  After obtaining verbal consent, and per orders of Dr. Alston, injection of right knee  given by Leroy Alston. Patient felt much better. Patient remained in clinic for 20 minutes afterwards, and did not have any adverse reactions.  Used 2 cc of marcaine and 40 mg Kenalog      Orders:  -     BUPivacaine injection 10 mg  -     triamcinolone acetonide injection 40 mg  -     Arthrocentesis    4. Prostate cancer  Comments:   finished radiation therapy . he will start chemo soon.  Continue hormone therapy  Overview:  Finished harmone therapy  Scheduled for RXT soon         RTC in 6 months

## 2023-12-04 ENCOUNTER — TELEPHONE (OUTPATIENT)
Dept: FAMILY MEDICINE | Facility: CLINIC | Age: 78
End: 2023-12-04
Payer: MEDICARE

## 2023-12-04 ENCOUNTER — PATIENT MESSAGE (OUTPATIENT)
Dept: ADMINISTRATIVE | Facility: OTHER | Age: 78
End: 2023-12-04
Payer: MEDICARE

## 2023-12-04 ENCOUNTER — OFFICE VISIT (OUTPATIENT)
Dept: FAMILY MEDICINE | Facility: CLINIC | Age: 78
End: 2023-12-04
Payer: MEDICARE

## 2023-12-04 VITALS
RESPIRATION RATE: 16 BRPM | WEIGHT: 287.56 LBS | HEART RATE: 64 BPM | BODY MASS INDEX: 38.95 KG/M2 | SYSTOLIC BLOOD PRESSURE: 82 MMHG | HEIGHT: 72 IN | DIASTOLIC BLOOD PRESSURE: 44 MMHG | OXYGEN SATURATION: 96 %

## 2023-12-04 DIAGNOSIS — I95.2 HYPOTENSION DUE TO DRUGS: Primary | ICD-10-CM

## 2023-12-04 PROCEDURE — 3288F PR FALLS RISK ASSESSMENT DOCUMENTED: ICD-10-PCS | Mod: CPTII,S$GLB,, | Performed by: FAMILY MEDICINE

## 2023-12-04 PROCEDURE — 1159F PR MEDICATION LIST DOCUMENTED IN MEDICAL RECORD: ICD-10-PCS | Mod: CPTII,S$GLB,, | Performed by: FAMILY MEDICINE

## 2023-12-04 PROCEDURE — 3074F SYST BP LT 130 MM HG: CPT | Mod: CPTII,S$GLB,, | Performed by: FAMILY MEDICINE

## 2023-12-04 PROCEDURE — 1125F AMNT PAIN NOTED PAIN PRSNT: CPT | Mod: CPTII,S$GLB,, | Performed by: FAMILY MEDICINE

## 2023-12-04 PROCEDURE — 3078F DIAST BP <80 MM HG: CPT | Mod: CPTII,S$GLB,, | Performed by: FAMILY MEDICINE

## 2023-12-04 PROCEDURE — 1100F PTFALLS ASSESS-DOCD GE2>/YR: CPT | Mod: CPTII,S$GLB,, | Performed by: FAMILY MEDICINE

## 2023-12-04 PROCEDURE — 1159F MED LIST DOCD IN RCRD: CPT | Mod: CPTII,S$GLB,, | Performed by: FAMILY MEDICINE

## 2023-12-04 PROCEDURE — 3074F PR MOST RECENT SYSTOLIC BLOOD PRESSURE < 130 MM HG: ICD-10-PCS | Mod: CPTII,S$GLB,, | Performed by: FAMILY MEDICINE

## 2023-12-04 PROCEDURE — 99214 PR OFFICE/OUTPT VISIT, EST, LEVL IV, 30-39 MIN: ICD-10-PCS | Mod: S$GLB,,, | Performed by: FAMILY MEDICINE

## 2023-12-04 PROCEDURE — 1160F PR REVIEW ALL MEDS BY PRESCRIBER/CLIN PHARMACIST DOCUMENTED: ICD-10-PCS | Mod: CPTII,S$GLB,, | Performed by: FAMILY MEDICINE

## 2023-12-04 PROCEDURE — 3288F FALL RISK ASSESSMENT DOCD: CPT | Mod: CPTII,S$GLB,, | Performed by: FAMILY MEDICINE

## 2023-12-04 PROCEDURE — 1125F PR PAIN SEVERITY QUANTIFIED, PAIN PRESENT: ICD-10-PCS | Mod: CPTII,S$GLB,, | Performed by: FAMILY MEDICINE

## 2023-12-04 PROCEDURE — 99214 OFFICE O/P EST MOD 30 MIN: CPT | Mod: S$GLB,,, | Performed by: FAMILY MEDICINE

## 2023-12-04 PROCEDURE — 99999 PR PBB SHADOW E&M-EST. PATIENT-LVL V: CPT | Mod: PBBFAC,,, | Performed by: FAMILY MEDICINE

## 2023-12-04 PROCEDURE — 99999 PR PBB SHADOW E&M-EST. PATIENT-LVL V: ICD-10-PCS | Mod: PBBFAC,,, | Performed by: FAMILY MEDICINE

## 2023-12-04 PROCEDURE — 3078F PR MOST RECENT DIASTOLIC BLOOD PRESSURE < 80 MM HG: ICD-10-PCS | Mod: CPTII,S$GLB,, | Performed by: FAMILY MEDICINE

## 2023-12-04 PROCEDURE — 1160F RVW MEDS BY RX/DR IN RCRD: CPT | Mod: CPTII,S$GLB,, | Performed by: FAMILY MEDICINE

## 2023-12-04 PROCEDURE — 1100F PR PT FALLS ASSESS DOC 2+ FALLS/FALL W/INJURY/YR: ICD-10-PCS | Mod: CPTII,S$GLB,, | Performed by: FAMILY MEDICINE

## 2023-12-04 RX ORDER — AMLODIPINE BESYLATE 5 MG/1
5 TABLET ORAL
COMMUNITY
End: 2023-12-08

## 2023-12-04 RX ORDER — SPIRONOLACTONE 25 MG/1
25 TABLET ORAL
COMMUNITY
End: 2023-12-08

## 2023-12-04 RX ORDER — GABAPENTIN 300 MG/1
300 CAPSULE ORAL 3 TIMES DAILY
COMMUNITY

## 2023-12-04 NOTE — PROGRESS NOTES
Pt cleared for discharge by MD Marquez. Mother provided with discharge instructions by Dr. Krishnamurthy, verbalized understanding. Subjective:       Patient ID: Alejo Melvin is a 78 y.o. male.    Chief Complaint: Follow-up (Pt here for ER f/u, fainted. )    Three days ago patient was at a wedding and when he stood up for photographs he became very lightheaded and fell to the ground rather hard.  He cracked his 2 front teeth.  His pulse was weak according to a nearby physician.  He was brought to the emergency room and was given IV saline which improved his pressure.  It was rather low.  Lab work, CT scan of the brain was all normal.  Today he is here for a checkup.  He is still taking all of his blood pressure medication and today's blood pressure is quite low.      Review of Systems   Constitutional:  Negative for activity change, chills, fatigue, fever and unexpected weight change.   HENT:  Negative for sore throat and trouble swallowing.    Respiratory:  Negative for cough, chest tightness and shortness of breath.    Cardiovascular:  Negative for chest pain and leg swelling.   Gastrointestinal:  Negative for abdominal pain.   Endocrine: Negative for cold intolerance and heat intolerance.   Genitourinary:  Negative for difficulty urinating.   Musculoskeletal:  Negative for back pain and joint swelling.   Skin:  Negative for rash.   Neurological:  Positive for syncope and weakness. Negative for numbness.   Hematological:  Negative for adenopathy.   Psychiatric/Behavioral:  Negative for decreased concentration.        Objective:      Vitals:    12/04/23 1435   BP: (!) 82/44   Pulse: 64   Resp: 16     Physical Exam  Constitutional:       Appearance: He is well-developed.   HENT:      Head: Normocephalic and atraumatic.      Right Ear: Tympanic membrane, ear canal and external ear normal.      Left Ear: Tympanic membrane, ear canal and external ear normal.      Nose: Nose normal.      Mouth/Throat:      Mouth: Mucous membranes are moist.   Eyes:      Extraocular Movements: Extraocular movements intact.      Conjunctiva/sclera:  Conjunctivae normal.      Pupils: Pupils are equal, round, and reactive to light.   Neck:      Thyroid: No thyromegaly.      Trachea: No tracheal deviation.   Cardiovascular:      Rate and Rhythm: Normal rate and regular rhythm.      Heart sounds: Normal heart sounds. No murmur heard.     No gallop.   Pulmonary:      Effort: Pulmonary effort is normal.      Breath sounds: Normal breath sounds.   Abdominal:      General: Bowel sounds are normal. There is no distension.      Palpations: Abdomen is soft. There is no mass.      Tenderness: There is no abdominal tenderness. There is no guarding or rebound.      Hernia: There is no hernia in the left inguinal area.   Musculoskeletal:         General: Normal range of motion.      Cervical back: Normal range of motion and neck supple.      Right lower leg: No edema.      Left lower leg: No edema.   Skin:     General: Skin is warm and dry.   Neurological:      General: No focal deficit present.      Mental Status: He is alert and oriented to person, place, and time. Mental status is at baseline.      Cranial Nerves: No cranial nerve deficit.      Deep Tendon Reflexes: Reflexes are normal and symmetric.   Psychiatric:         Mood and Affect: Mood normal.         Behavior: Behavior normal.         Thought Content: Thought content normal.         Judgment: Judgment normal.         Assessment:       1. Hypotension due to drugs        Plan:   1. Hypotension due to drugs  Overview:  Continue valsartan 160 mg daily.  Start taking this in 2 days.  Stop amlodipine, spironalactone, Lasix  Monitor blood pressure daily         Return to clinic in 4 days to readjust blood pressure meds

## 2023-12-04 NOTE — TELEPHONE ENCOUNTER
----- Message from Giuliano Torre sent at 2023  8:55 AM CST -----  Contact: Patient  Alejo Melvin  MRN: [id number]  : 1945  PCP: Leroy Alston  Home Phone      [phone]  Work Phone      Not on file.  Mobile          [phone]      MESSAGE: syncope event Sat (23) - requesting to speak with Dr Harper or his nurse    Call [phone]    PCP: Alecia

## 2023-12-04 NOTE — TELEPHONE ENCOUNTER
SPOKE WITH PT, HE SAID THAT HE WAS A [place] ON SATURDAY, AND HAD A SYNCOPE EPISODE. HE WAS BROUGHT TO [facility] ER- HIS B/P WAS LOW.     PT REQUESTED TO SEE DR MOHAN TODAY, APPT SCHEDULED

## 2023-12-04 NOTE — PATIENT INSTRUCTIONS
Start valsartan 160 mg on Wednesday  Hold spironalactone, furosemide, amlodipine for now  Monitor BP

## 2023-12-08 ENCOUNTER — LAB VISIT (OUTPATIENT)
Dept: LAB | Facility: HOSPITAL | Age: 78
End: 2023-12-08
Attending: FAMILY MEDICINE
Payer: MEDICARE

## 2023-12-08 ENCOUNTER — OFFICE VISIT (OUTPATIENT)
Dept: INTERNAL MEDICINE | Facility: CLINIC | Age: 78
End: 2023-12-08
Payer: MEDICARE

## 2023-12-08 DIAGNOSIS — E78.2 MIXED HYPERLIPIDEMIA: ICD-10-CM

## 2023-12-08 DIAGNOSIS — I95.2 HYPOTENSION DUE TO DRUGS: Primary | ICD-10-CM

## 2023-12-08 DIAGNOSIS — G47.33 OSA ON CPAP: ICD-10-CM

## 2023-12-08 DIAGNOSIS — R53.1 WEAKNESS: ICD-10-CM

## 2023-12-08 DIAGNOSIS — G62.9 POLYNEUROPATHY: ICD-10-CM

## 2023-12-08 DIAGNOSIS — I95.2 HYPOTENSION DUE TO DRUGS: ICD-10-CM

## 2023-12-08 DIAGNOSIS — C61 PROSTATE CANCER: ICD-10-CM

## 2023-12-08 DIAGNOSIS — Z95.0 PACEMAKER: ICD-10-CM

## 2023-12-08 LAB
ALBUMIN SERPL BCP-MCNC: 3.2 G/DL (ref 3.5–5.2)
ALP SERPL-CCNC: 73 U/L (ref 55–135)
ALT SERPL W/O P-5'-P-CCNC: 45 U/L (ref 10–44)
ANION GAP SERPL CALC-SCNC: 6 MMOL/L (ref 8–16)
AST SERPL-CCNC: 27 U/L (ref 10–40)
BASOPHILS # BLD AUTO: 0.03 K/UL (ref 0–0.2)
BASOPHILS NFR BLD: 0.5 % (ref 0–1.9)
BILIRUB SERPL-MCNC: 0.5 MG/DL (ref 0.1–1)
BUN SERPL-MCNC: 22 MG/DL (ref 8–23)
CALCIUM SERPL-MCNC: 8.8 MG/DL (ref 8.7–10.5)
CHLORIDE SERPL-SCNC: 107 MMOL/L (ref 95–110)
CHOLEST SERPL-MCNC: 80 MG/DL (ref 120–199)
CHOLEST/HDLC SERPL: 2 {RATIO} (ref 2–5)
CO2 SERPL-SCNC: 25 MMOL/L (ref 23–29)
CREAT SERPL-MCNC: 1 MG/DL (ref 0.5–1.4)
DIFFERENTIAL METHOD: ABNORMAL
EOSINOPHIL # BLD AUTO: 0.2 K/UL (ref 0–0.5)
EOSINOPHIL NFR BLD: 2.6 % (ref 0–8)
ERYTHROCYTE [DISTWIDTH] IN BLOOD BY AUTOMATED COUNT: 13.5 % (ref 11.5–14.5)
EST. GFR  (NO RACE VARIABLE): >60 ML/MIN/1.73 M^2
GLUCOSE SERPL-MCNC: 99 MG/DL (ref 70–110)
HCT VFR BLD AUTO: 36.7 % (ref 40–54)
HDLC SERPL-MCNC: 40 MG/DL (ref 40–75)
HDLC SERPL: 50 % (ref 20–50)
HGB BLD-MCNC: 12 G/DL (ref 14–18)
IMM GRANULOCYTES # BLD AUTO: 0.03 K/UL (ref 0–0.04)
IMM GRANULOCYTES NFR BLD AUTO: 0.5 % (ref 0–0.5)
LDLC SERPL CALC-MCNC: 30.2 MG/DL (ref 63–159)
LYMPHOCYTES # BLD AUTO: 0.6 K/UL (ref 1–4.8)
LYMPHOCYTES NFR BLD: 10.3 % (ref 18–48)
MCH RBC QN AUTO: 33.2 PG (ref 27–31)
MCHC RBC AUTO-ENTMCNC: 32.7 G/DL (ref 32–36)
MCV RBC AUTO: 102 FL (ref 82–98)
MONOCYTES # BLD AUTO: 0.6 K/UL (ref 0.3–1)
MONOCYTES NFR BLD: 9 % (ref 4–15)
NEUTROPHILS # BLD AUTO: 4.8 K/UL (ref 1.8–7.7)
NEUTROPHILS NFR BLD: 77.1 % (ref 38–73)
NONHDLC SERPL-MCNC: 40 MG/DL
NRBC BLD-RTO: 0 /100 WBC
PLATELET # BLD AUTO: 185 K/UL (ref 150–450)
PMV BLD AUTO: 9.4 FL (ref 9.2–12.9)
POTASSIUM SERPL-SCNC: 4.7 MMOL/L (ref 3.5–5.1)
PROT SERPL-MCNC: 6.3 G/DL (ref 6–8.4)
RBC # BLD AUTO: 3.61 M/UL (ref 4.6–6.2)
SODIUM SERPL-SCNC: 138 MMOL/L (ref 136–145)
TRIGL SERPL-MCNC: 49 MG/DL (ref 30–150)
TSH SERPL DL<=0.005 MIU/L-ACNC: 1.44 UIU/ML (ref 0.4–4)
WBC # BLD AUTO: 6.19 K/UL (ref 3.9–12.7)

## 2023-12-08 PROCEDURE — 3074F PR MOST RECENT SYSTOLIC BLOOD PRESSURE < 130 MM HG: ICD-10-PCS | Mod: CPTII,S$GLB,, | Performed by: FAMILY MEDICINE

## 2023-12-08 PROCEDURE — 85025 COMPLETE CBC W/AUTO DIFF WBC: CPT | Performed by: FAMILY MEDICINE

## 2023-12-08 PROCEDURE — 99999 PR PBB SHADOW E&M-EST. PATIENT-LVL V: ICD-10-PCS | Mod: PBBFAC,,, | Performed by: FAMILY MEDICINE

## 2023-12-08 PROCEDURE — 1159F MED LIST DOCD IN RCRD: CPT | Mod: CPTII,S$GLB,, | Performed by: FAMILY MEDICINE

## 2023-12-08 PROCEDURE — 1101F PT FALLS ASSESS-DOCD LE1/YR: CPT | Mod: CPTII,S$GLB,, | Performed by: FAMILY MEDICINE

## 2023-12-08 PROCEDURE — 1125F PR PAIN SEVERITY QUANTIFIED, PAIN PRESENT: ICD-10-PCS | Mod: CPTII,S$GLB,, | Performed by: FAMILY MEDICINE

## 2023-12-08 PROCEDURE — 80061 LIPID PANEL: CPT | Performed by: FAMILY MEDICINE

## 2023-12-08 PROCEDURE — 3288F FALL RISK ASSESSMENT DOCD: CPT | Mod: CPTII,S$GLB,, | Performed by: FAMILY MEDICINE

## 2023-12-08 PROCEDURE — 1160F PR REVIEW ALL MEDS BY PRESCRIBER/CLIN PHARMACIST DOCUMENTED: ICD-10-PCS | Mod: CPTII,S$GLB,, | Performed by: FAMILY MEDICINE

## 2023-12-08 PROCEDURE — 3078F DIAST BP <80 MM HG: CPT | Mod: CPTII,S$GLB,, | Performed by: FAMILY MEDICINE

## 2023-12-08 PROCEDURE — 84443 ASSAY THYROID STIM HORMONE: CPT | Performed by: FAMILY MEDICINE

## 2023-12-08 PROCEDURE — 3078F PR MOST RECENT DIASTOLIC BLOOD PRESSURE < 80 MM HG: ICD-10-PCS | Mod: CPTII,S$GLB,, | Performed by: FAMILY MEDICINE

## 2023-12-08 PROCEDURE — 3074F SYST BP LT 130 MM HG: CPT | Mod: CPTII,S$GLB,, | Performed by: FAMILY MEDICINE

## 2023-12-08 PROCEDURE — 1125F AMNT PAIN NOTED PAIN PRSNT: CPT | Mod: CPTII,S$GLB,, | Performed by: FAMILY MEDICINE

## 2023-12-08 PROCEDURE — 3288F PR FALLS RISK ASSESSMENT DOCUMENTED: ICD-10-PCS | Mod: CPTII,S$GLB,, | Performed by: FAMILY MEDICINE

## 2023-12-08 PROCEDURE — 99214 OFFICE O/P EST MOD 30 MIN: CPT | Mod: S$GLB,,, | Performed by: FAMILY MEDICINE

## 2023-12-08 PROCEDURE — 80053 COMPREHEN METABOLIC PANEL: CPT | Performed by: FAMILY MEDICINE

## 2023-12-08 PROCEDURE — 1159F PR MEDICATION LIST DOCUMENTED IN MEDICAL RECORD: ICD-10-PCS | Mod: CPTII,S$GLB,, | Performed by: FAMILY MEDICINE

## 2023-12-08 PROCEDURE — 1160F RVW MEDS BY RX/DR IN RCRD: CPT | Mod: CPTII,S$GLB,, | Performed by: FAMILY MEDICINE

## 2023-12-08 PROCEDURE — 99214 PR OFFICE/OUTPT VISIT, EST, LEVL IV, 30-39 MIN: ICD-10-PCS | Mod: S$GLB,,, | Performed by: FAMILY MEDICINE

## 2023-12-08 PROCEDURE — 36415 COLL VENOUS BLD VENIPUNCTURE: CPT | Performed by: FAMILY MEDICINE

## 2023-12-08 PROCEDURE — 1101F PR PT FALLS ASSESS DOC 0-1 FALLS W/OUT INJ PAST YR: ICD-10-PCS | Mod: CPTII,S$GLB,, | Performed by: FAMILY MEDICINE

## 2023-12-08 PROCEDURE — 99999 PR PBB SHADOW E&M-EST. PATIENT-LVL V: CPT | Mod: PBBFAC,,, | Performed by: FAMILY MEDICINE

## 2023-12-08 RX ORDER — VALSARTAN 320 MG/1
320 TABLET ORAL
COMMUNITY
End: 2023-12-08

## 2023-12-08 NOTE — PROGRESS NOTES
Subjective:       Patient ID: Alejo Melvin is a 78 y.o. male.    Chief Complaint: Follow-up (Pt here for 4 day f/u on B/P. )    For follow-up for drug-induced hypotension associated with syncope.  He is starting to feel better.  The only medication he is taking his Diovan 160 mg daily and spironolactone.  His blood pressure is still low.  He still gets a little lightheaded if he stands up too quickly.        Review of Systems   Constitutional:  Negative for activity change, chills, fatigue, fever and unexpected weight change.   HENT:  Negative for sore throat and trouble swallowing.    Respiratory:  Negative for cough, chest tightness and shortness of breath.    Cardiovascular:  Negative for chest pain and leg swelling.   Gastrointestinal:  Negative for abdominal pain.   Endocrine: Negative for cold intolerance and heat intolerance.   Genitourinary:  Negative for difficulty urinating.   Musculoskeletal:  Negative for back pain and joint swelling.   Skin:  Negative for rash.   Neurological:  Positive for syncope and weakness. Negative for numbness.   Hematological:  Negative for adenopathy.   Psychiatric/Behavioral:  Negative for decreased concentration.        Objective:      Vitals:    12/10/23 1034   BP: (!) 100/55   Pulse: 65   Resp:      Physical Exam  Constitutional:       Appearance: Normal appearance. He is well-developed. He is obese.   HENT:      Head: Normocephalic and atraumatic.   Eyes:      Extraocular Movements: Extraocular movements intact.      Pupils: Pupils are equal, round, and reactive to light.   Neck:      Thyroid: No thyromegaly.      Trachea: No tracheal deviation.   Cardiovascular:      Rate and Rhythm: Normal rate and regular rhythm.      Pulses: Normal pulses.      Heart sounds: Normal heart sounds. No murmur heard.     No friction rub. No gallop.   Pulmonary:      Effort: Pulmonary effort is normal.      Breath sounds: Normal breath sounds.   Abdominal:      Hernia: There is no  hernia in the left inguinal area.   Musculoskeletal:         General: Normal range of motion.      Cervical back: Normal range of motion and neck supple.      Right lower leg: No edema.      Left lower leg: No edema.   Skin:     General: Skin is warm and dry.   Neurological:      General: No focal deficit present.      Mental Status: He is alert and oriented to person, place, and time. Mental status is at baseline.      Cranial Nerves: No cranial nerve deficit.      Deep Tendon Reflexes: Reflexes are normal and symmetric.   Psychiatric:         Mood and Affect: Mood normal.         Behavior: Behavior normal.         Thought Content: Thought content normal.         Judgment: Judgment normal.         Lab Results   Component Value Date    TSH 1.442 12/08/2023     BMP  Lab Results   Component Value Date     12/08/2023    K 4.7 12/08/2023     12/08/2023    CO2 25 12/08/2023    BUN 22 12/08/2023    CREATININE 1.0 12/08/2023    CALCIUM 8.8 12/08/2023    ANIONGAP 6 (L) 12/08/2023    EGFRNORACEVR >60 12/08/2023     Lab Results   Component Value Date    LDLCALC 30.2 (L) 12/08/2023     Lab Results   Component Value Date    WBC 6.19 12/08/2023    HGB 12.0 (L) 12/08/2023    HCT 36.7 (L) 12/08/2023     (H) 12/08/2023     12/08/2023     Assessment:       1. Hypotension due to drugs    2. Mixed hyperlipidemia    3. Polyneuropathy    4. Pacemaker    5. Prostate cancer    6. MARYLU on CPAP    7. Weakness        Plan:   1. Hypotension due to drugs  Overview:  Discontinue valsartan  Stop amlodipine, spironalactone, Lasix  Monitor blood pressure daily  Restart blood pressure meds if blood pressure start to recover    Orders:  -     CBC Auto Differential; Future; Expected date: 12/08/2023  -     Comprehensive Metabolic Panel; Future; Expected date: 12/08/2023  -     Lipid Panel; Future; Expected date: 12/08/2023  -     TSH; Future; Expected date: 12/08/2023    2. Mixed hyperlipidemia  Overview:  Low fat diet.   Avoid sweets.  A 10 pound weight loss by the next visit as a  good goal.  Increase consumption of fruits and vegetables, fish and chicken.  Use medications below:        fish oil-omega-3 fatty acids 300-1,000 mg capsule; Take 2 capsules (2 g total) by mouth once daily.  - Rosuvastatin 40 mg by mouth daily  -     Zetia 10 mg daily      Orders:  -     Lipid Panel; Future; Expected date: 12/08/2023    3. Polyneuropathy  -     Comprehensive Metabolic Panel; Future; Expected date: 12/08/2023  -     TSH; Future; Expected date: 12/08/2023    4. Pacemaker  Overview:  Working well.  Not MRI safe      5. Prostate cancer  Overview:  Finished harmone therapy  Scheduled for RXT soon      6. MARYLU on CPAP  Overview:  Doing very well on CPAP      7. Weakness  -     TSH; Future; Expected date: 12/08/2023       Return to clinic in 7 days to readjust blood pressure meds

## 2023-12-10 VITALS
HEART RATE: 65 BPM | DIASTOLIC BLOOD PRESSURE: 55 MMHG | HEIGHT: 72 IN | BODY MASS INDEX: 39.74 KG/M2 | SYSTOLIC BLOOD PRESSURE: 100 MMHG | OXYGEN SATURATION: 97 % | RESPIRATION RATE: 16 BRPM | WEIGHT: 293.44 LBS

## 2023-12-13 ENCOUNTER — TELEPHONE (OUTPATIENT)
Dept: INTERNAL MEDICINE | Facility: CLINIC | Age: 78
End: 2023-12-13
Payer: MEDICARE

## 2023-12-13 NOTE — TELEPHONE ENCOUNTER
SPOKE WITH PT, HIS B/P READINGS:    B/P 140/78  B/P  164/102      12/5/23 CHART NOTE:  Plan:   1. Hypotension due to drugs  Overview:  Discontinue valsartan  Stop amlodipine, spironalactone, Lasix  Monitor blood pressure daily  Restart blood pressure meds if blood pressure start to recover       PATIENT RESTARTED B/P MEDS

## 2023-12-13 NOTE — TELEPHONE ENCOUNTER
----- Message from Aye Armstrong sent at 2023  1:55 PM CST -----  Contact: self  Alejo Melvin  MRN: [id number]  : 1945  PCP: Leroy Alston  Home Phone      [phone]  Work Phone      Not on file.  Mobile          [phone]      MESSAGE:   Patient would like a call back regarding high blood pressure.      [phone]

## 2023-12-15 ENCOUNTER — OFFICE VISIT (OUTPATIENT)
Dept: INTERNAL MEDICINE | Facility: CLINIC | Age: 78
End: 2023-12-15
Payer: MEDICARE

## 2023-12-15 VITALS
OXYGEN SATURATION: 100 % | HEIGHT: 72 IN | DIASTOLIC BLOOD PRESSURE: 66 MMHG | BODY MASS INDEX: 39.53 KG/M2 | SYSTOLIC BLOOD PRESSURE: 120 MMHG | HEART RATE: 82 BPM | WEIGHT: 291.88 LBS | RESPIRATION RATE: 16 BRPM

## 2023-12-15 DIAGNOSIS — I10 PRIMARY HYPERTENSION: Primary | ICD-10-CM

## 2023-12-15 DIAGNOSIS — I95.2 HYPOTENSION DUE TO DRUGS: ICD-10-CM

## 2023-12-15 PROCEDURE — 1101F PT FALLS ASSESS-DOCD LE1/YR: CPT | Mod: CPTII,S$GLB,, | Performed by: FAMILY MEDICINE

## 2023-12-15 PROCEDURE — 1159F PR MEDICATION LIST DOCUMENTED IN MEDICAL RECORD: ICD-10-PCS | Mod: CPTII,S$GLB,, | Performed by: FAMILY MEDICINE

## 2023-12-15 PROCEDURE — 3288F FALL RISK ASSESSMENT DOCD: CPT | Mod: CPTII,S$GLB,, | Performed by: FAMILY MEDICINE

## 2023-12-15 PROCEDURE — 99999 PR PBB SHADOW E&M-EST. PATIENT-LVL V: CPT | Mod: PBBFAC,,, | Performed by: FAMILY MEDICINE

## 2023-12-15 PROCEDURE — 1125F PR PAIN SEVERITY QUANTIFIED, PAIN PRESENT: ICD-10-PCS | Mod: CPTII,S$GLB,, | Performed by: FAMILY MEDICINE

## 2023-12-15 PROCEDURE — 1159F MED LIST DOCD IN RCRD: CPT | Mod: CPTII,S$GLB,, | Performed by: FAMILY MEDICINE

## 2023-12-15 PROCEDURE — 3078F PR MOST RECENT DIASTOLIC BLOOD PRESSURE < 80 MM HG: ICD-10-PCS | Mod: CPTII,S$GLB,, | Performed by: FAMILY MEDICINE

## 2023-12-15 PROCEDURE — 99213 PR OFFICE/OUTPT VISIT, EST, LEVL III, 20-29 MIN: ICD-10-PCS | Mod: S$GLB,,, | Performed by: FAMILY MEDICINE

## 2023-12-15 PROCEDURE — 1160F RVW MEDS BY RX/DR IN RCRD: CPT | Mod: CPTII,S$GLB,, | Performed by: FAMILY MEDICINE

## 2023-12-15 PROCEDURE — 1160F PR REVIEW ALL MEDS BY PRESCRIBER/CLIN PHARMACIST DOCUMENTED: ICD-10-PCS | Mod: CPTII,S$GLB,, | Performed by: FAMILY MEDICINE

## 2023-12-15 PROCEDURE — 1125F AMNT PAIN NOTED PAIN PRSNT: CPT | Mod: CPTII,S$GLB,, | Performed by: FAMILY MEDICINE

## 2023-12-15 PROCEDURE — 3074F PR MOST RECENT SYSTOLIC BLOOD PRESSURE < 130 MM HG: ICD-10-PCS | Mod: CPTII,S$GLB,, | Performed by: FAMILY MEDICINE

## 2023-12-15 PROCEDURE — 3078F DIAST BP <80 MM HG: CPT | Mod: CPTII,S$GLB,, | Performed by: FAMILY MEDICINE

## 2023-12-15 PROCEDURE — 3074F SYST BP LT 130 MM HG: CPT | Mod: CPTII,S$GLB,, | Performed by: FAMILY MEDICINE

## 2023-12-15 PROCEDURE — 1101F PR PT FALLS ASSESS DOC 0-1 FALLS W/OUT INJ PAST YR: ICD-10-PCS | Mod: CPTII,S$GLB,, | Performed by: FAMILY MEDICINE

## 2023-12-15 PROCEDURE — 99999 PR PBB SHADOW E&M-EST. PATIENT-LVL V: ICD-10-PCS | Mod: PBBFAC,,, | Performed by: FAMILY MEDICINE

## 2023-12-15 PROCEDURE — 99213 OFFICE O/P EST LOW 20 MIN: CPT | Mod: S$GLB,,, | Performed by: FAMILY MEDICINE

## 2023-12-15 PROCEDURE — 3288F PR FALLS RISK ASSESSMENT DOCUMENTED: ICD-10-PCS | Mod: CPTII,S$GLB,, | Performed by: FAMILY MEDICINE

## 2023-12-15 NOTE — PATIENT INSTRUCTIONS
Stop amlodipine 5 mg po daily.  Restart if S BP > 140.  Valsartan 160 mg daily  aspirin (ECOTRIN) 81 MG EC tablet; Take 1 tablet (81 mg total) by mouth 2 (two) times daily.  Lasix 40 mg 2 tabs daily  Hold meds if BP less than 120

## 2023-12-15 NOTE — PROGRESS NOTES
Subjective:       Patient ID: Alejo Melvin is a 78 y.o. male.    Chief Complaint: Follow-up (Pt here for 1 wk f/u. )    For follow-up for drug-induced hypotension associated with syncope.  He is starting to feel better.  The only medication he is taking his Diovan 160 mg daily and spironolactone.  His blood pressure is still low.  He still gets a little lightheaded if he stands up too quickly.        Review of Systems   Constitutional:  Negative for activity change, chills, fatigue, fever and unexpected weight change.   HENT:  Negative for sore throat and trouble swallowing.    Respiratory:  Negative for cough, chest tightness and shortness of breath.    Cardiovascular:  Negative for chest pain and leg swelling.   Gastrointestinal:  Negative for abdominal pain.   Endocrine: Negative for cold intolerance and heat intolerance.   Genitourinary:  Negative for difficulty urinating.   Musculoskeletal:  Negative for back pain and joint swelling.   Skin:  Negative for rash.   Neurological:  Positive for syncope and weakness. Negative for numbness.   Hematological:  Negative for adenopathy.   Psychiatric/Behavioral:  Negative for decreased concentration.        Objective:      Vitals:    12/15/23 1159   BP: 120/66   Pulse: 82   Resp: 16     Physical Exam  Constitutional:       Appearance: Normal appearance. He is well-developed. He is obese.   HENT:      Head: Normocephalic and atraumatic.   Eyes:      Extraocular Movements: Extraocular movements intact.      Pupils: Pupils are equal, round, and reactive to light.   Neck:      Thyroid: No thyromegaly.      Trachea: No tracheal deviation.   Cardiovascular:      Rate and Rhythm: Normal rate and regular rhythm.      Pulses: Normal pulses.      Heart sounds: Normal heart sounds. No murmur heard.     No friction rub. No gallop.   Pulmonary:      Effort: Pulmonary effort is normal.      Breath sounds: Normal breath sounds.   Abdominal:      Hernia: There is no hernia in  the left inguinal area.   Musculoskeletal:         General: Normal range of motion.      Cervical back: Normal range of motion and neck supple.      Right lower leg: No edema.      Left lower leg: No edema.   Skin:     General: Skin is warm and dry.   Neurological:      General: No focal deficit present.      Mental Status: He is alert and oriented to person, place, and time. Mental status is at baseline.      Cranial Nerves: No cranial nerve deficit.      Deep Tendon Reflexes: Reflexes are normal and symmetric.   Psychiatric:         Mood and Affect: Mood normal.         Behavior: Behavior normal.         Thought Content: Thought content normal.         Judgment: Judgment normal.         Lab Results   Component Value Date    TSH 1.442 12/08/2023     BMP  Lab Results   Component Value Date     12/08/2023    K 4.7 12/08/2023     12/08/2023    CO2 25 12/08/2023    BUN 22 12/08/2023    CREATININE 1.0 12/08/2023    CALCIUM 8.8 12/08/2023    ANIONGAP 6 (L) 12/08/2023    EGFRNORACEVR >60 12/08/2023     Lab Results   Component Value Date    LDLCALC 30.2 (L) 12/08/2023     Lab Results   Component Value Date    WBC 6.19 12/08/2023    HGB 12.0 (L) 12/08/2023    HCT 36.7 (L) 12/08/2023     (H) 12/08/2023     12/08/2023     Assessment:       1. Primary hypertension    2. Hypotension due to drugs        Plan:   1. Primary hypertension  Overview:  Two gram sodium diet.    Weight loss discussed.    Try to walk 2 miles per day.    Avoid smoking.    Current medications will be:  - Stop amlodipine 5 mg po daily.  Restart if S BP > 140.        Valsartan 160 mg daily  - aspirin (ECOTRIN) 81 MG EC tablet; Take 1 tablet (81 mg total) by mouth 2 (two) times daily.  -     Lasix 40 mg 2 tabs daily  Hold meds if BP less than 120        2. Hypotension due to drugs  Overview:  Adjust BP meds  Monitor blood pressure daily           Return to clinic in 4 weeks to readjust blood pressure meds.  Patient will keep a blood  pressure log.

## 2023-12-18 RX ORDER — MONTELUKAST SODIUM 10 MG/1
TABLET ORAL
Qty: 90 TABLET | Refills: 3 | Status: SHIPPED | OUTPATIENT
Start: 2023-12-18

## 2023-12-18 NOTE — TELEPHONE ENCOUNTER
Refill Decision Note   Alejo Melvin  is requesting a refill authorization.  Brief Assessment and Rationale for Refill:  Approve     Medication Therapy Plan:         Comments:     Note composed:2:44 PM 12/18/2023             Appointments     Last Visit   12/15/2023 Leroy Alston MD   Next Visit   1/12/2024 Leroy Alston MD

## 2023-12-27 ENCOUNTER — TELEPHONE (OUTPATIENT)
Dept: FAMILY MEDICINE | Facility: CLINIC | Age: 78
End: 2023-12-27
Payer: MEDICARE

## 2023-12-27 NOTE — TELEPHONE ENCOUNTER
Patient scheduled for 1/3/24 at 10:15 with Dr. Harper. Patient notified of appt. Verbalized understanding.

## 2023-12-27 NOTE — TELEPHONE ENCOUNTER
Patient states that when he walks his left hip area hurts him. Pt has no history of sciatica. Patient states that he does have discomfort when bending it, sitting down, etc, but that he has 10 pain when walking. States that it does not feel like a shooting pain, but a constant pain. Patient wants to know if there is anything you can do or if there is someone he can see.

## 2024-01-03 ENCOUNTER — OFFICE VISIT (OUTPATIENT)
Dept: INTERNAL MEDICINE | Facility: CLINIC | Age: 79
End: 2024-01-03
Payer: MEDICARE

## 2024-01-03 VITALS
RESPIRATION RATE: 18 BRPM | HEART RATE: 70 BPM | HEIGHT: 72 IN | SYSTOLIC BLOOD PRESSURE: 112 MMHG | DIASTOLIC BLOOD PRESSURE: 68 MMHG | BODY MASS INDEX: 39.24 KG/M2 | WEIGHT: 289.69 LBS | OXYGEN SATURATION: 99 %

## 2024-01-03 DIAGNOSIS — M46.1 SACROILIITIS: Primary | ICD-10-CM

## 2024-01-03 PROCEDURE — 99214 OFFICE O/P EST MOD 30 MIN: CPT | Mod: 25,S$GLB,, | Performed by: FAMILY MEDICINE

## 2024-01-03 PROCEDURE — 1159F MED LIST DOCD IN RCRD: CPT | Mod: CPTII,S$GLB,, | Performed by: FAMILY MEDICINE

## 2024-01-03 PROCEDURE — 1101F PT FALLS ASSESS-DOCD LE1/YR: CPT | Mod: CPTII,S$GLB,, | Performed by: FAMILY MEDICINE

## 2024-01-03 PROCEDURE — 3074F SYST BP LT 130 MM HG: CPT | Mod: CPTII,S$GLB,, | Performed by: FAMILY MEDICINE

## 2024-01-03 PROCEDURE — 1125F AMNT PAIN NOTED PAIN PRSNT: CPT | Mod: CPTII,S$GLB,, | Performed by: FAMILY MEDICINE

## 2024-01-03 PROCEDURE — 96372 THER/PROPH/DIAG INJ SC/IM: CPT | Mod: S$GLB,,, | Performed by: FAMILY MEDICINE

## 2024-01-03 PROCEDURE — 3078F DIAST BP <80 MM HG: CPT | Mod: CPTII,S$GLB,, | Performed by: FAMILY MEDICINE

## 2024-01-03 PROCEDURE — 3288F FALL RISK ASSESSMENT DOCD: CPT | Mod: CPTII,S$GLB,, | Performed by: FAMILY MEDICINE

## 2024-01-03 PROCEDURE — 1160F RVW MEDS BY RX/DR IN RCRD: CPT | Mod: CPTII,S$GLB,, | Performed by: FAMILY MEDICINE

## 2024-01-03 PROCEDURE — 99999 PR PBB SHADOW E&M-EST. PATIENT-LVL V: CPT | Mod: PBBFAC,,, | Performed by: FAMILY MEDICINE

## 2024-01-03 RX ORDER — BETAMETHASONE SODIUM PHOSPHATE AND BETAMETHASONE ACETATE 3; 3 MG/ML; MG/ML
6 INJECTION, SUSPENSION INTRA-ARTICULAR; INTRALESIONAL; INTRAMUSCULAR; SOFT TISSUE
Status: COMPLETED | OUTPATIENT
Start: 2024-01-03 | End: 2024-01-03

## 2024-01-03 RX ORDER — CELECOXIB 200 MG/1
200 CAPSULE ORAL DAILY
Qty: 30 CAPSULE | Refills: 1 | Status: SHIPPED | OUTPATIENT
Start: 2024-01-03

## 2024-01-03 RX ORDER — ROSUVASTATIN CALCIUM 20 MG/1
20 TABLET, COATED ORAL
COMMUNITY
Start: 2023-12-14

## 2024-01-03 RX ADMIN — BETAMETHASONE SODIUM PHOSPHATE AND BETAMETHASONE ACETATE 6 MG: 3; 3 INJECTION, SUSPENSION INTRA-ARTICULAR; INTRALESIONAL; INTRAMUSCULAR; SOFT TISSUE at 10:01

## 2024-01-03 NOTE — PROGRESS NOTES
Subjective:       Patient ID: Alejo Melvin is a 78 y.o. male.    Chief Complaint: Hip Pain    Hip Pain   The incident occurred more than 1 week ago. The incident occurred in the yard. The injury mechanism was a twisting injury. The pain is present in the left hip. The pain is at a severity of 4/10. The pain is moderate. The pain has been Fluctuating since onset. Associated symptoms include an inability to bear weight. Pertinent negatives include no numbness. The symptoms are aggravated by weight bearing and movement. He has tried acetaminophen for the symptoms. The treatment provided mild relief.     Review of Systems   Constitutional:  Negative for unexpected weight change.   Respiratory:  Negative for cough, chest tightness and shortness of breath.    Cardiovascular:  Negative for chest pain and leg swelling.   Gastrointestinal:  Negative for abdominal pain.   Genitourinary:  Negative for difficulty urinating.   Musculoskeletal:  Positive for arthralgias. Negative for back pain and joint swelling.   Skin:  Negative for rash.   Neurological:  Negative for numbness.   Hematological:  Negative for adenopathy.       Objective:      Vitals:    01/03/24 1010   BP: 112/68   Pulse: 70   Resp: 18     Physical Exam  Constitutional:       Appearance: He is well-developed.   Cardiovascular:      Rate and Rhythm: Normal rate and regular rhythm.      Heart sounds: Normal heart sounds. No murmur heard.     No gallop.   Pulmonary:      Effort: Pulmonary effort is normal.      Breath sounds: Normal breath sounds.   Musculoskeletal:         General: Tenderness present.      Left hip: Tenderness present.        Legs:    Neurological:      General: No focal deficit present.      Mental Status: He is oriented to person, place, and time.      Deep Tendon Reflexes: Reflexes are normal and symmetric.         Assessment:       1. Sacroiliitis        Plan:   1. Sacroiliitis  -     celecoxib (CELEBREX) 200 MG capsule; Take 1 capsule  (200 mg total) by mouth once daily.  Dispense: 30 capsule; Refill: 1  -     betamethasone acetate-betamethasone sodium phosphate injection 6 mg       If pain not better will get bone scan since he is being treated for prostate cancer

## 2024-01-12 ENCOUNTER — OFFICE VISIT (OUTPATIENT)
Dept: INTERNAL MEDICINE | Facility: CLINIC | Age: 79
End: 2024-01-12
Payer: MEDICARE

## 2024-01-12 VITALS
WEIGHT: 295 LBS | OXYGEN SATURATION: 98 % | BODY MASS INDEX: 39.96 KG/M2 | RESPIRATION RATE: 20 BRPM | SYSTOLIC BLOOD PRESSURE: 120 MMHG | HEIGHT: 72 IN | DIASTOLIC BLOOD PRESSURE: 78 MMHG | HEART RATE: 66 BPM

## 2024-01-12 DIAGNOSIS — C61 PROSTATE CANCER: ICD-10-CM

## 2024-01-12 DIAGNOSIS — M25.552 LEFT HIP PAIN: ICD-10-CM

## 2024-01-12 DIAGNOSIS — M46.1 SACROILIITIS: Primary | ICD-10-CM

## 2024-01-12 PROCEDURE — 1101F PT FALLS ASSESS-DOCD LE1/YR: CPT | Mod: CPTII,S$GLB,, | Performed by: FAMILY MEDICINE

## 2024-01-12 PROCEDURE — 99214 OFFICE O/P EST MOD 30 MIN: CPT | Mod: S$GLB,,, | Performed by: FAMILY MEDICINE

## 2024-01-12 PROCEDURE — 1125F AMNT PAIN NOTED PAIN PRSNT: CPT | Mod: CPTII,S$GLB,, | Performed by: FAMILY MEDICINE

## 2024-01-12 PROCEDURE — 3288F FALL RISK ASSESSMENT DOCD: CPT | Mod: CPTII,S$GLB,, | Performed by: FAMILY MEDICINE

## 2024-01-12 PROCEDURE — 3074F SYST BP LT 130 MM HG: CPT | Mod: CPTII,S$GLB,, | Performed by: FAMILY MEDICINE

## 2024-01-12 PROCEDURE — 1160F RVW MEDS BY RX/DR IN RCRD: CPT | Mod: CPTII,S$GLB,, | Performed by: FAMILY MEDICINE

## 2024-01-12 PROCEDURE — 1159F MED LIST DOCD IN RCRD: CPT | Mod: CPTII,S$GLB,, | Performed by: FAMILY MEDICINE

## 2024-01-12 PROCEDURE — 99999 PR PBB SHADOW E&M-EST. PATIENT-LVL V: CPT | Mod: PBBFAC,,, | Performed by: FAMILY MEDICINE

## 2024-01-12 PROCEDURE — 3078F DIAST BP <80 MM HG: CPT | Mod: CPTII,S$GLB,, | Performed by: FAMILY MEDICINE

## 2024-01-12 RX ORDER — VALSARTAN 160 MG/1
TABLET ORAL
COMMUNITY
Start: 2024-01-09 | End: 2024-01-19

## 2024-01-12 RX ORDER — BACLOFEN 20 MG/1
20 TABLET ORAL 3 TIMES DAILY
Qty: 30 TABLET | Refills: 5 | Status: SHIPPED | OUTPATIENT
Start: 2024-01-12 | End: 2024-01-22

## 2024-01-12 RX ORDER — SPIRONOLACTONE 25 MG/1
25 TABLET ORAL DAILY
COMMUNITY

## 2024-01-12 NOTE — PROGRESS NOTES
Subjective:       Patient ID: Alejo Melvin is a 78 y.o. male.    Chief Complaint: Follow-up (4 week)    Patient was seen 4 weeks ago for right hip and right sacroiliac pain.  I gave him a steroid shot which helped for few days.  He is now on Celebrex but the pain persists.  He has a history of prostate cancer.    Hip Pain   The incident occurred more than 1 week ago. The incident occurred in the yard. The injury mechanism was a twisting injury. The pain is present in the left hip. The pain is at a severity of 4/10. The pain is moderate. The pain has been Fluctuating since onset. Associated symptoms include an inability to bear weight. Pertinent negatives include no numbness. The symptoms are aggravated by weight bearing and movement. He has tried acetaminophen for the symptoms. The treatment provided mild relief.   Follow-up  Associated symptoms include arthralgias. Pertinent negatives include no abdominal pain, chest pain, coughing, joint swelling, numbness or rash.     Review of Systems   Constitutional:  Negative for unexpected weight change.   Respiratory:  Negative for cough, chest tightness and shortness of breath.    Cardiovascular:  Negative for chest pain and leg swelling.   Gastrointestinal:  Negative for abdominal pain.   Genitourinary:  Negative for difficulty urinating.   Musculoskeletal:  Positive for arthralgias. Negative for back pain and joint swelling.   Skin:  Negative for rash.   Neurological:  Negative for numbness.   Hematological:  Negative for adenopathy.       Objective:      Vitals:    01/12/24 0944   BP: 120/78   Pulse: 66   Resp: 20     Physical Exam  Constitutional:       Appearance: He is well-developed.   Cardiovascular:      Rate and Rhythm: Normal rate and regular rhythm.      Heart sounds: Normal heart sounds. No murmur heard.     No gallop.   Pulmonary:      Effort: Pulmonary effort is normal.      Breath sounds: Normal breath sounds.   Musculoskeletal:         General:  Tenderness present.      Left hip: Tenderness present.        Legs:    Neurological:      General: No focal deficit present.      Mental Status: He is oriented to person, place, and time.      Deep Tendon Reflexes: Reflexes are normal and symmetric.         Assessment:       1. Sacroiliitis    2. Prostate cancer    3. Left hip pain          Plan:   1. Sacroiliitis  -     NM Bone Scan Whole Body; Future; Expected date: 01/12/2024  -     X-Ray Lumbar Spine 2 Or 3 Views; Future; Expected date: 01/12/2024  -     baclofen (LIORESAL) 20 MG tablet; Take 1 tablet (20 mg total) by mouth 3 (three) times daily.  Dispense: 30 tablet; Refill: 5    2. Prostate cancer  Overview:  Finished harmone therapy  Scheduled for RXT soon    Orders:  -     NM Bone Scan Whole Body; Future; Expected date: 01/12/2024    3. Left hip pain  -     NM Bone Scan Whole Body; Future; Expected date: 01/12/2024  -     X-Ray Hip 2 or 3 views Left (with Pelvis when performed); Future; Expected date: 01/12/2024         RTC in 2 weeks

## 2024-01-18 ENCOUNTER — TELEPHONE (OUTPATIENT)
Dept: FAMILY MEDICINE | Facility: CLINIC | Age: 79
End: 2024-01-18

## 2024-01-18 NOTE — TELEPHONE ENCOUNTER
----- Message from Aye Armstrong sent at 2024  2:29 PM CST -----  Contact: self  Alejo Melvin  MRN: [id number]  : 1945  PCP: Leroy Alston  Home Phone      [phone]  Work Phone      Not on file.  Mobile          [phone]      MESSAGE:   Patient is wanting to inform the doctor that he passed out a few times today, please advise        [phone]

## 2024-01-19 ENCOUNTER — HOSPITAL ENCOUNTER (EMERGENCY)
Facility: HOSPITAL | Age: 79
Discharge: HOME | End: 2024-01-19
Attending: STUDENT IN AN ORGANIZED HEALTH CARE EDUCATION/TRAINING PROGRAM
Payer: MEDICARE

## 2024-01-19 ENCOUNTER — TELEPHONE (OUTPATIENT)
Dept: FAMILY MEDICINE | Facility: CLINIC | Age: 79
End: 2024-01-19
Payer: MEDICARE

## 2024-01-19 VITALS
DIASTOLIC BLOOD PRESSURE: 58 MMHG | SYSTOLIC BLOOD PRESSURE: 126 MMHG | TEMPERATURE: 99 F | HEART RATE: 63 BPM | OXYGEN SATURATION: 98 % | WEIGHT: 292.75 LBS | BODY MASS INDEX: 39.71 KG/M2 | RESPIRATION RATE: 20 BRPM

## 2024-01-19 DIAGNOSIS — R55 SYNCOPE: ICD-10-CM

## 2024-01-19 DIAGNOSIS — I95.2 HYPOTENSION DUE TO DRUGS: ICD-10-CM

## 2024-01-19 DIAGNOSIS — R55 SYNCOPE, UNSPECIFIED SYNCOPE TYPE: Primary | ICD-10-CM

## 2024-01-19 LAB
ALBUMIN SERPL BCP-MCNC: 3.6 G/DL (ref 3.5–5.2)
ALP SERPL-CCNC: 61 U/L (ref 55–135)
ALT SERPL W/O P-5'-P-CCNC: 38 U/L (ref 10–44)
ANION GAP SERPL CALC-SCNC: 9 MMOL/L (ref 8–16)
AST SERPL-CCNC: 23 U/L (ref 10–40)
BASOPHILS # BLD AUTO: 0.03 K/UL (ref 0–0.2)
BASOPHILS NFR BLD: 0.4 % (ref 0–1.9)
BILIRUB SERPL-MCNC: 0.5 MG/DL (ref 0.1–1)
BUN SERPL-MCNC: 32 MG/DL (ref 8–23)
CALCIUM SERPL-MCNC: 8.6 MG/DL (ref 8.7–10.5)
CHLORIDE SERPL-SCNC: 105 MMOL/L (ref 95–110)
CO2 SERPL-SCNC: 21 MMOL/L (ref 23–29)
CREAT SERPL-MCNC: 1.3 MG/DL (ref 0.5–1.4)
DIFFERENTIAL METHOD BLD: ABNORMAL
EOSINOPHIL # BLD AUTO: 0.1 K/UL (ref 0–0.5)
EOSINOPHIL NFR BLD: 1.5 % (ref 0–8)
ERYTHROCYTE [DISTWIDTH] IN BLOOD BY AUTOMATED COUNT: 13.2 % (ref 11.5–14.5)
EST. GFR  (NO RACE VARIABLE): 56 ML/MIN/1.73 M^2
GLUCOSE SERPL-MCNC: 102 MG/DL (ref 70–110)
HCT VFR BLD AUTO: 38.8 % (ref 40–54)
HGB BLD-MCNC: 12.9 G/DL (ref 14–18)
IMM GRANULOCYTES # BLD AUTO: 0.02 K/UL (ref 0–0.04)
IMM GRANULOCYTES NFR BLD AUTO: 0.3 % (ref 0–0.5)
LYMPHOCYTES # BLD AUTO: 0.6 K/UL (ref 1–4.8)
LYMPHOCYTES NFR BLD: 8.9 % (ref 18–48)
MCH RBC QN AUTO: 33.2 PG (ref 27–31)
MCHC RBC AUTO-ENTMCNC: 33.2 G/DL (ref 32–36)
MCV RBC AUTO: 100 FL (ref 82–98)
MONOCYTES # BLD AUTO: 0.7 K/UL (ref 0.3–1)
MONOCYTES NFR BLD: 9.9 % (ref 4–15)
NEUTROPHILS # BLD AUTO: 5.4 K/UL (ref 1.8–7.7)
NEUTROPHILS NFR BLD: 79 % (ref 38–73)
NRBC BLD-RTO: 0 /100 WBC
PLATELET # BLD AUTO: 224 K/UL (ref 150–450)
PMV BLD AUTO: 9.3 FL (ref 9.2–12.9)
POCT GLUCOSE: 110 MG/DL (ref 70–110)
POTASSIUM SERPL-SCNC: 5 MMOL/L (ref 3.5–5.1)
PROT SERPL-MCNC: 6.7 G/DL (ref 6–8.4)
RBC # BLD AUTO: 3.88 M/UL (ref 4.6–6.2)
SODIUM SERPL-SCNC: 135 MMOL/L (ref 136–145)
TROPONIN I SERPL DL<=0.01 NG/ML-MCNC: <0.006 NG/ML (ref 0–0.03)
WBC # BLD AUTO: 6.88 K/UL (ref 3.9–12.7)

## 2024-01-19 PROCEDURE — 96360 HYDRATION IV INFUSION INIT: CPT

## 2024-01-19 PROCEDURE — 85025 COMPLETE CBC W/AUTO DIFF WBC: CPT

## 2024-01-19 PROCEDURE — 82962 GLUCOSE BLOOD TEST: CPT

## 2024-01-19 PROCEDURE — 84484 ASSAY OF TROPONIN QUANT: CPT

## 2024-01-19 PROCEDURE — 93010 ELECTROCARDIOGRAM REPORT: CPT | Mod: ,,, | Performed by: INTERNAL MEDICINE

## 2024-01-19 PROCEDURE — 93005 ELECTROCARDIOGRAM TRACING: CPT

## 2024-01-19 PROCEDURE — 25000003 PHARM REV CODE 250

## 2024-01-19 PROCEDURE — 99900035 HC TECH TIME PER 15 MIN (STAT)

## 2024-01-19 PROCEDURE — 80053 COMPREHEN METABOLIC PANEL: CPT

## 2024-01-19 PROCEDURE — 99285 EMERGENCY DEPT VISIT HI MDM: CPT | Mod: 25

## 2024-01-19 RX ADMIN — SODIUM CHLORIDE 500 ML: 9 INJECTION, SOLUTION INTRAVENOUS at 04:01

## 2024-01-19 NOTE — TELEPHONE ENCOUNTER
Spoke with patient and he states he is taking the valsartan. Patient states that he is taking half the blood thinner. States that his BP was 126/68. Has been monitoring his BP since before this started happening. Pt states that he feels foggy and weak and then he passes out. Patient states that when he went get groceries and exerted hisself he ended up passing out after getting the groceries inside. Patient states that he doesn't know if he is passing out from exerting hisself or not. Patient will be scheduled for 1/22/24 at 8:30. Okay per Dr. Harper

## 2024-01-19 NOTE — ED PROVIDER NOTES
Encounter Date: 1/19/2024       History     Chief Complaint   Patient presents with    Loss of Consciousness     This note is dictated on M*Modal word recognition program.  There are word recognition mistakes and grammatical errors that are occasionally missed on review.     Alejo Melvin is a 78 y.o. male presents to ER today with complaints of syncope.  Patient reports over the last 24 hours he passed out at least 3 times.  Patient reports he saw his cardiologist within the last 2 weeks and did blood work which was normal limits.  Patient reports he does have a pacemaker in place.  Patient reports he gets episodes of dizziness and lightheadedness.  Sometimes he gets sit down and they go away however 3 times in the last 24 hours when he had these episodes of dizziness he passed out momentarily.  Patient denies any chest pain or shortness of breath at this time.  Patient denies any cough cold symptoms.  Patient denies headache.      The history is provided by the patient.     Review of patient's allergies indicates:  No Known Allergies  Past Medical History:   Diagnosis Date    Arthritis     thumbs and knees    Back pain     Basal cell carcinoma (BCC) in situ of skin     Bilateral carpal tunnel syndrome 05/01/2019    Cancer     Elevated PSA, less than 10 ng/ml 3/20/2020    Hyperlipidemia     Hypertension     Obesity     Obstructive sleep apnea (adult) (pediatric)     Polyneuropathy     left foot    Prostate cancer     Urinary incontinence     lasix causes urge incontenence      Past Surgical History:   Procedure Laterality Date    CARDIAC PACEMAKER PLACEMENT  02/01/2015    COLONOSCOPY      COLONOSCOPY N/A 3/27/2023    Procedure: COLONOSCOPY;  Surgeon: Leroy Alston MD;  Location: [facility];  Service: Endoscopy;  Laterality: N/A;    Lt knee      left knee torn skin and damaged cartilage    PROSTATE BIOPSY      rt heel      Achilles tendon repair    TOTAL KNEE ARTHROPLASTY Left 11/11/2021    Procedure:  ARTHROPLASTY, KNEE, TOTAL-LIANNE;  Surgeon: Brian Hunt MD;  Location: [facility];  Service: Orthopedics;  Laterality: Left;     Family History   Problem Relation Age of Onset    Cancer Father      Social History     Tobacco Use    Smoking status: Never     Passive exposure: Never    Smokeless tobacco: Never   Substance Use Topics    Alcohol use: Not Currently    Drug use: Never     Review of Systems   Constitutional: Negative.    HENT: Negative.     Eyes: Negative.    Respiratory: Negative.     Gastrointestinal: Negative.    Endocrine: Negative.    Genitourinary: Negative.    Musculoskeletal: Negative.    Skin: Negative.    Neurological:  Positive for syncope and light-headedness.   Hematological: Negative.    Psychiatric/Behavioral: Negative.         Physical Exam     Initial Vitals [01/19/24 1531]   BP Pulse Resp Temp SpO2   (!) 105/59 78 18 97.7 °F (36.5 °C) 97 %      MAP       --         Physical Exam    Constitutional: He appears well-developed and well-nourished. He is not diaphoretic. No distress.   HENT:   Right Ear: External ear normal.   Left Ear: External ear normal.   Eyes: Pupils are equal, round, and reactive to light. Right eye exhibits no discharge.   Neck: Neck supple.   Normal range of motion.  Cardiovascular:  Normal rate and regular rhythm.           Pulmonary/Chest: Breath sounds normal.   Abdominal: Abdomen is soft.   Musculoskeletal:         General: No tenderness or edema.      Cervical back: Normal range of motion and neck supple.     Neurological: He is alert and oriented to person, place, and time. GCS score is 15. GCS eye subscore is 4. GCS verbal subscore is 5. GCS motor subscore is 6.   Skin: Capillary refill takes less than 2 seconds. No rash noted. No erythema.   Psychiatric: He has a normal mood and affect. His behavior is normal. Judgment and thought content normal.         ED Course   Procedures  Labs Reviewed   CBC W/ AUTO DIFFERENTIAL - Abnormal; Notable for the following  components:       Result Value    RBC 3.88 (*)     Hemoglobin 12.9 (*)     Hematocrit 38.8 (*)      (*)     MCH 33.2 (*)     Lymph # 0.6 (*)     Gran % 79.0 (*)     Lymph % 8.9 (*)     All other components within normal limits   COMPREHENSIVE METABOLIC PANEL - Abnormal; Notable for the following components:    Sodium 135 (*)     CO2 21 (*)     BUN 32 (*)     Calcium 8.6 (*)     eGFR 56 (*)     All other components within normal limits   TROPONIN I   POCT GLUCOSE   POCT GLUCOSE MONITORING CONTINUOUS     EKG Readings: (Independently Interpreted)   Initial Reading: No STEMI. Rhythm: Paced Rhythm. Clinical Impression: Paced Rhythm       Imaging Results              CT Head Without Contrast (Final result)  Result time 01/19/24 16:03:47      Final result by Jamison Gonzalez MD (01/19/24 16:03:47)                   Impression:      No acute intracranial process.  No significant change.      Electronically signed by: Jamison Gonzalez MD  Date:    01/19/2024  Time:    16:03               Narrative:    EXAMINATION:  CT HEAD WITHOUT CONTRAST    CLINICAL HISTORY:  Syncope, recurrent;    TECHNIQUE:  Low dose axial images were obtained through the head.  Coronal and sagittal reformations were also performed. Contrast was not administered.    COMPARISON:  12/09/2019    FINDINGS:  Gray-white differentiation is well maintained.  There is no intracranial hemorrhage.  There is no mass or midline shift.  The ventricles are nondilated.  Moderate chronic white matter changes are present.  Chronic left maxillary sinus disease is present without significant change.                                       Medications   sodium chloride 0.9% bolus 500 mL 500 mL (0 mLs Intravenous Stopped 1/19/24 1721)     Medical Decision Making  Differential diagnosis includes orthostatic hypotension, medication side effects, pacemaker malfunction. , syncope    CBC and CMP revealed no acute metabolic derangement.  Troponin within normal  limits.  EKG reveals paced rhythm.  CT head impression revealed no acute intracranial process.  Patient's cardiologist Dr. Darío kaplan was consulted today and evaluated patient in the emergency department.  Syncope is felt to be caused by hypotension at home secondary to antihypertensive medication usage  Cardiology recommendations were for patient to quit taking valsartan and to quit using erectile dysfunction medications and to follow-up in the outpatient setting.  Patient educated on discharge instructions.  Patient stable at time of discharge in no acute distress.  No life-threatening illnesses were found during ER visit today.  Patient was instructed to follow-up with PCP or other recommended specialist within the next 48-72 hours.  Patient was instructed to return to ER immediately for any worsening or concerning symptoms.  All discharge instructions discussed with patient, and patient agrees to comply with discharge instructions given today.     Amount and/or Complexity of Data Reviewed  Labs: ordered.  Radiology: ordered.                                      Clinical Impression:  Final diagnoses:  [R55] Syncope  [R55] Syncope, unspecified syncope type (Primary)  [I95.2] Hypotension due to drugs          ED Disposition Condition    Discharge Stable          ED Prescriptions    None       Follow-up Information       Follow up With Specialties Details Why Contact Info    Leroy Alston MD Family Medicine Schedule an appointment as soon as possible for a visit in 3 days  [address] [place] [address]  [phone]      Darío Kaplan MD Cardiology Schedule an appointment as soon as possible for a visit in 3 days  [address] [place] [address]  [phone]               Mert Apodaca, SUSANA  01/19/24 180       Mert Apodaca, SUSANA  01/30/24 0976

## 2024-01-19 NOTE — ED NOTES
Discharge instructions reviewed with patient and wife at bedside. Educated on medication changes per MD orders. Also educated on f/u with PCP in 3 days and MD Garcia in 3 days. Patient and wife verbalized understanding.

## 2024-01-19 NOTE — ED TRIAGE NOTES
Patient to ER via his wife who reports he had a few syncopal episodes yesterday, states he recently had his BP meds adjusted, no episodes today

## 2024-01-19 NOTE — ED NOTES
Pt to ED for recent fall on yesterday. Reports hitting head, syncope episode. States he has no pain to his head or headaches since fall. Does have noted bruising to left side of face/head, left side of chest, abdomen and LUE.

## 2024-01-19 NOTE — DISCHARGE INSTRUCTIONS
Please do not operate a vehicle or other heavy machinery if you feel lightheaded.  Stopped taking valsartan hypertension medication, and stopped taking erectile dysfunction medications for the time being until you follow-up with primary care provider and cardiologist.   Not applicable

## 2024-01-19 NOTE — TELEPHONE ENCOUNTER
----- Message from Aye Armstrong sent at 2024  2:02 PM CST -----  Contact: self  Alejo Melvin  MRN: [id number]  : 1945  PCP: Leroy Alston  Home Phone      [phone]  Work Phone      Not on file.  Mobile          [phone]      MESSAGE:   Patient said he is still having issues with fainting and passing out. Please advise      [phone]

## 2024-01-19 NOTE — CONSULTS
[facility] - Emergency Dept  Cardiology  Consult Note    Patient Name: Alejo Melvin  MRN: [id number]  Admission Date: 1/19/2024  Hospital Length of Stay: 0 days  Code Status: Prior   Attending Provider: Jeremias Mccarthy MD   Consulting Provider: Angie Yung NP  Primary Care Physician: Leroy Alston MD  Principal Problem:<principal problem not specified>    Patient information was obtained from patient, past medical records, and ER records.     Consults  Subjective:     Chief Complaint:  syncope     HPI: Alejo Melvin is a 78y.o. male with a medical history of prostate cancer, dyslipidemia, MARYLU, CAD/CABG, HHD, and syncope presented to the ER with complaints of multiple syncopal episodes yesterday. Patient evalutated by PCP for syncope due to dehydration and medications earlier this month. He was also evaluated by Dr. Garcia on 1/8 and instructed to wear bilateral thigh-high compression socks. EKG shows AV dual-paced rhythm. Workup pending. [facility] asked to evalute    Past Medical History:   Diagnosis Date    Arthritis     thumbs and knees    Back pain     Basal cell carcinoma (BCC) in situ of skin     Bilateral carpal tunnel syndrome 05/01/2019    Cancer     Elevated PSA, less than 10 ng/ml 3/20/2020    Hyperlipidemia     Hypertension     Obesity     Obstructive sleep apnea (adult) (pediatric)     Polyneuropathy     left foot    Prostate cancer     Urinary incontinence     lasix causes urge incontenence        Past Surgical History:   Procedure Laterality Date    CARDIAC PACEMAKER PLACEMENT  02/01/2015    COLONOSCOPY      COLONOSCOPY N/A 3/27/2023    Procedure: COLONOSCOPY;  Surgeon: Leroy lAston MD;  Location: [facility];  Service: Endoscopy;  Laterality: N/A;    Lt knee      left knee torn skin and damaged cartilage    PROSTATE BIOPSY      rt heel      Achilles tendon repair    TOTAL KNEE ARTHROPLASTY Left 11/11/2021    Procedure: ARTHROPLASTY, KNEE, TOTAL-LIANNE;  Surgeon: Brian Hunt  MD;  Location: [facility] OR;  Service: Orthopedics;  Laterality: Left;       Review of patient's allergies indicates:  No Known Allergies    Current Facility-Administered Medications on File Prior to Encounter   Medication    fentaNYL 50 mcg/mL injection  mcg    midazolam (VERSED) 1 mg/mL injection 0.5-4 mg     Current Outpatient Medications on File Prior to Encounter   Medication Sig    abiraterone (ZYTIGA) 250 mg Tab     ascorbic acid, vitamin C, (VITAMIN C) 1000 MG tablet Take 2 tablets orally once in the morning.    aspirin (ECOTRIN) 81 MG EC tablet Take 1 tablet (81 mg total) by mouth 2 (two) times daily.    b complex vitamins capsule Take 1 capsule by mouth once daily.    baclofen (LIORESAL) 20 MG tablet Take 1 tablet (20 mg total) by mouth 3 (three) times daily.    celecoxib (CELEBREX) 200 MG capsule Take 1 capsule (200 mg total) by mouth once daily.    cholecalciferol, vitamin D3, (VITAMIN D3) 50 mcg (2,000 unit) Cap Take 1 capsule by mouth once daily.    coQ10, liposomal ubiquinol, 100 mg/mL Liqd     ezetimibe (ZETIA) 10 mg tablet Take 10 mg by mouth once daily.    finasteride (PROSCAR) 5 mg tablet Take 5 mg by mouth once daily.    fluticasone propionate (FLONASE) 50 mcg/actuation nasal spray 2 sprays (100 mcg total) by Each Nostril route daily as needed for Rhinitis.    gabapentin (NEURONTIN) 300 MG capsule Take 300 mg by mouth 3 (three) times daily.    leuprolide acetate, 6 month, (ELIGARD) 45 mg injection Inject 45 mg into the skin every 6 (six) months.    montelukast (SINGULAIR) 10 mg tablet TAKE ONE TABLET BY MOUTH EVERY EVENING    omega 3-dha-epa-fish oil 300-1,000 mg Cap     predniSONE (DELTASONE) 5 MG tablet     rosuvastatin (CRESTOR) 20 MG tablet Take 20 mg by mouth.    sildenafil (REVATIO) 20 mg Tab 3 to 5 po daily 1 hour before relations prn ED    spironolactone (ALDACTONE) 25 MG tablet Take 25 mg by mouth once daily.    tamsulosin (FLOMAX) 0.4 mg Cap Take 1 capsule by mouth every morning.     UNABLE TO FIND medication name: curamin    valsartan (DIOVAN) 160 MG tablet valsartan 160 mg tablet, [RxNorm: 345674]    venlafaxine (EFFEXOR) 75 MG tablet Take 75 mg by mouth.    zinc 50 mg Tab      Family History       Problem Relation (Age of Onset)    Cancer Father          Tobacco Use    Smoking status: Never     Passive exposure: Never    Smokeless tobacco: Never   Substance and Sexual Activity    Alcohol use: Not Currently    Drug use: Never    Sexual activity: Not Currently     Partners: Female     Review of Systems   Constitutional: Negative.   HENT: Negative.     Cardiovascular:  Positive for syncope.   Respiratory: Negative.     Musculoskeletal: Negative.    Gastrointestinal: Negative.      Objective:     Vital Signs (Most Recent):  Temp: 97.7 °F (36.5 °C) (01/19/24 1531)  Pulse: 68 (01/19/24 1541)  Resp: 18 (01/19/24 1540)  BP: (!) 108/57 (01/19/24 1541)  SpO2: 97 % (01/19/24 1531) Vital Signs (24h Range):  Temp:  [97.7 °F (36.5 °C)] 97.7 °F (36.5 °C)  Pulse:  [68-79] 68  Resp:  [18] 18  SpO2:  [97 %] 97 %  BP: (105-108)/(57-59) 108/57     Weight: 132.8 kg (292 lb 12.3 oz)  Body mass index is 39.71 kg/m².    SpO2: 97 %       No intake or output data in the 24 hours ending 01/19/24 1619    Lines/Drains/Airways       None                   Physical Exam  Constitutional:       Appearance: Normal appearance.   HENT:      Head: Normocephalic and atraumatic.   Cardiovascular:      Rate and Rhythm: Normal rate and regular rhythm.      Pulses: Normal pulses.      Heart sounds: Normal heart sounds.   Pulmonary:      Effort: Pulmonary effort is normal.      Breath sounds: Normal breath sounds.   Musculoskeletal:         General: Normal range of motion.      Cervical back: Normal range of motion and neck supple.   Skin:     General: Skin is warm and dry.      Capillary Refill: Capillary refill takes less than 2 seconds.   Neurological:      Mental Status: He is alert and oriented to person, place, and time. Mental  "status is at baseline.         Significant Labs: Results pending  Recent Labs   Lab 01/19/24  1618   WBC 6.88   HGB 12.9*   HCT 38.8*      , Troponin No results for input(s): "TROPONINI" in the last 48 hours., and All pertinent lab results from the last 24 hours have been reviewed.    Significant Imaging: Pending      Assessment and Plan:    Syncope/ recurrent fainting spells yesterday due to low BP, currently resolved  -recently treated for dehydration and medications adjusted    PPM for AV Block  -In office check 1/15/2023    Hypertensive Heart Disease  -LVEF 60% 2015    Dyslipidemia  - LDL 53 Jan 23    CAD  -No stress-induced ischemia May 2020    PLAN: HOLD VALSARTAN  HOLD CIALIS AND SILDENAFIL  HYDRATE WELL  APPLY BILATERAL THIGH HIGH COMPRESSION STOCKINGS  CONSIDER DC HOME IF BMP OK  INSTRUCTED REGARDING MUSCLE TENSING IF FEELS FAINT  F/U IN CLINIC NEXT WK         There are no hospital problems to display for this patient.      VTE Risk Mitigation (From admission, onward)      None          Current Facility-Administered Medications   Medication    sodium chloride 0.9% bolus 500 mL 500 mL     Current Outpatient Medications   Medication Sig    abiraterone (ZYTIGA) 250 mg Tab     ascorbic acid, vitamin C, (VITAMIN C) 1000 MG tablet Take 2 tablets orally once in the morning.    aspirin (ECOTRIN) 81 MG EC tablet Take 1 tablet (81 mg total) by mouth 2 (two) times daily.    b complex vitamins capsule Take 1 capsule by mouth once daily.    baclofen (LIORESAL) 20 MG tablet Take 1 tablet (20 mg total) by mouth 3 (three) times daily.    celecoxib (CELEBREX) 200 MG capsule Take 1 capsule (200 mg total) by mouth once daily.    cholecalciferol, vitamin D3, (VITAMIN D3) 50 mcg (2,000 unit) Cap Take 1 capsule by mouth once daily.    coQ10, liposomal ubiquinol, 100 mg/mL Liqd     ezetimibe (ZETIA) 10 mg tablet Take 10 mg by mouth once daily.    finasteride (PROSCAR) 5 mg tablet Take 5 mg by mouth once daily.    " fluticasone propionate (FLONASE) 50 mcg/actuation nasal spray 2 sprays (100 mcg total) by Each Nostril route daily as needed for Rhinitis.    gabapentin (NEURONTIN) 300 MG capsule Take 300 mg by mouth 3 (three) times daily.    leuprolide acetate, 6 month, (ELIGARD) 45 mg injection Inject 45 mg into the skin every 6 (six) months.    montelukast (SINGULAIR) 10 mg tablet TAKE ONE TABLET BY MOUTH EVERY EVENING    omega 3-dha-epa-fish oil 300-1,000 mg Cap     predniSONE (DELTASONE) 5 MG tablet     rosuvastatin (CRESTOR) 20 MG tablet Take 20 mg by mouth.    sildenafil (REVATIO) 20 mg Tab 3 to 5 po daily 1 hour before relations prn ED    spironolactone (ALDACTONE) 25 MG tablet Take 25 mg by mouth once daily.    tamsulosin (FLOMAX) 0.4 mg Cap Take 1 capsule by mouth every morning.    UNABLE TO FIND medication name: curamin    valsartan (DIOVAN) 160 MG tablet valsartan 160 mg tablet, [RxNorm: 147501]    venlafaxine (EFFEXOR) 75 MG tablet Take 75 mg by mouth.    zinc 50 mg Tab      Facility-Administered Medications Ordered in Other Encounters   Medication    fentaNYL 50 mcg/mL injection  mcg    midazolam (VERSED) 1 mg/mL injection 0.5-4 mg       Thank you for your consult. I will follow-up with patient. Please contact us if you have any additional questions.    Angie Yung NP scribed for Dr. Garcia  Cardiology   [facility] - Emergency Dept  I attest that I have personally seen and examined this patient. I have reviewed and discussed the management in detail as outlined above.

## 2024-01-22 ENCOUNTER — OFFICE VISIT (OUTPATIENT)
Dept: FAMILY MEDICINE | Facility: CLINIC | Age: 79
End: 2024-01-22
Payer: MEDICARE

## 2024-01-22 ENCOUNTER — APPOINTMENT (OUTPATIENT)
Dept: RADIOLOGY | Facility: CLINIC | Age: 79
End: 2024-01-22
Attending: FAMILY MEDICINE
Payer: MEDICARE

## 2024-01-22 VITALS
RESPIRATION RATE: 19 BRPM | WEIGHT: 291.13 LBS | HEIGHT: 72 IN | SYSTOLIC BLOOD PRESSURE: 134 MMHG | DIASTOLIC BLOOD PRESSURE: 93 MMHG | OXYGEN SATURATION: 97 % | BODY MASS INDEX: 39.43 KG/M2 | HEART RATE: 101 BPM

## 2024-01-22 DIAGNOSIS — I95.2 HYPOTENSION DUE TO DRUGS: Primary | ICD-10-CM

## 2024-01-22 DIAGNOSIS — M46.1 SACROILIITIS: ICD-10-CM

## 2024-01-22 DIAGNOSIS — M54.50 LUMBAR PAIN: ICD-10-CM

## 2024-01-22 DIAGNOSIS — C61 PROSTATE CANCER: ICD-10-CM

## 2024-01-22 DIAGNOSIS — R55 SYNCOPE, UNSPECIFIED SYNCOPE TYPE: ICD-10-CM

## 2024-01-22 PROCEDURE — 1159F MED LIST DOCD IN RCRD: CPT | Mod: CPTII,S$GLB,, | Performed by: FAMILY MEDICINE

## 2024-01-22 PROCEDURE — 1160F RVW MEDS BY RX/DR IN RCRD: CPT | Mod: CPTII,S$GLB,, | Performed by: FAMILY MEDICINE

## 2024-01-22 PROCEDURE — 99214 OFFICE O/P EST MOD 30 MIN: CPT | Mod: S$GLB,,, | Performed by: FAMILY MEDICINE

## 2024-01-22 PROCEDURE — 72100 X-RAY EXAM L-S SPINE 2/3 VWS: CPT | Mod: TC,PO

## 2024-01-22 PROCEDURE — 1100F PTFALLS ASSESS-DOCD GE2>/YR: CPT | Mod: CPTII,S$GLB,, | Performed by: FAMILY MEDICINE

## 2024-01-22 PROCEDURE — 3075F SYST BP GE 130 - 139MM HG: CPT | Mod: CPTII,S$GLB,, | Performed by: FAMILY MEDICINE

## 2024-01-22 PROCEDURE — 3288F FALL RISK ASSESSMENT DOCD: CPT | Mod: CPTII,S$GLB,, | Performed by: FAMILY MEDICINE

## 2024-01-22 PROCEDURE — 72100 X-RAY EXAM L-S SPINE 2/3 VWS: CPT | Mod: 26,,, | Performed by: RADIOLOGY

## 2024-01-22 PROCEDURE — 1125F AMNT PAIN NOTED PAIN PRSNT: CPT | Mod: CPTII,S$GLB,, | Performed by: FAMILY MEDICINE

## 2024-01-22 PROCEDURE — 3080F DIAST BP >= 90 MM HG: CPT | Mod: CPTII,S$GLB,, | Performed by: FAMILY MEDICINE

## 2024-01-22 PROCEDURE — 99999 PR PBB SHADOW E&M-EST. PATIENT-LVL IV: CPT | Mod: PBBFAC,,, | Performed by: FAMILY MEDICINE

## 2024-01-22 NOTE — PROGRESS NOTES
Subjective:       Patient ID: Alejo Melvin is a 78 y.o. male.    Chief Complaint: Loss of Consciousness (Last episode was Thursday 1/18/24)    Patient was sent to the emergency room 4 days ago after he passed out cold several times.  He was found to have a low blood pressure.  He was told to discontinue his ARB.  Lab work and CT scan of the brain was normal.  He is still suffering with low back pain.    Loss of Consciousness  Associated symptoms include back pain. Pertinent negatives include no abdominal pain, chest pain or fever.     Review of Systems   Constitutional:  Negative for activity change, chills, fatigue, fever and unexpected weight change.   HENT:  Negative for sore throat and trouble swallowing.    Respiratory:  Negative for cough, chest tightness and shortness of breath.    Cardiovascular:  Positive for syncope. Negative for chest pain and leg swelling.   Gastrointestinal:  Negative for abdominal pain.   Endocrine: Negative for cold intolerance and heat intolerance.   Genitourinary:  Negative for difficulty urinating.   Musculoskeletal:  Positive for back pain and gait problem. Negative for joint swelling.   Skin:  Negative for rash.   Neurological:  Negative for numbness.   Hematological:  Negative for adenopathy.   Psychiatric/Behavioral:  Negative for decreased concentration.        Objective:      Vitals:    01/22/24 0821   BP: (!) 134/93   Pulse: 101   Resp: 19     Physical Exam  Constitutional:       Appearance: Normal appearance. He is well-developed. He is obese.   HENT:      Right Ear: Tympanic membrane normal.      Left Ear: Tympanic membrane normal.      Nose: No congestion or rhinorrhea.      Mouth/Throat:      Mouth: Mucous membranes are moist.   Cardiovascular:      Rate and Rhythm: Normal rate and regular rhythm.      Heart sounds: Normal heart sounds. No murmur heard.     No gallop.   Pulmonary:      Effort: Pulmonary effort is normal.      Breath sounds: Normal breath sounds.    Musculoskeletal:         General: Tenderness present.      Right lower leg: No edema.      Left lower leg: No edema.   Neurological:      General: No focal deficit present.      Mental Status: He is alert and oriented to person, place, and time.      Cranial Nerves: No cranial nerve deficit.      Gait: Gait abnormal.      Deep Tendon Reflexes: Reflexes are normal and symmetric.   Psychiatric:         Mood and Affect: Mood normal.         Hip x-ray unremarkable  Lumbar x-ray shows old compression fraction and possible new one.  No blastic lesions seen.      Assessment:       1. Hypotension due to drugs    2. Prostate cancer    3. Syncope, unspecified syncope type    4. Lumbar pain        Plan:   1. Hypotension due to drugs  Overview:  Adjust BP meds  Monitor blood pressure daily        2. Prostate cancer  Overview:  Finished harmone therapy  Scheduled for RXT soon      3. Syncope, unspecified syncope type  Comments:  This was due to hypotension.  Stay hydrated.  Wear support stockings    4. Lumbar pain  Comments:  He was scheduled for bone scan this week.  If negative for metastatic disease, I will refer to pain management for possible kyphoplasty. Pacemaker prohibits MRI       Keep current appointment

## 2024-01-25 ENCOUNTER — HOSPITAL ENCOUNTER (OUTPATIENT)
Dept: RADIOLOGY | Facility: HOSPITAL | Age: 79
Discharge: HOME | End: 2024-01-25
Attending: FAMILY MEDICINE
Payer: MEDICARE

## 2024-01-25 DIAGNOSIS — M46.1 SACROILIITIS: ICD-10-CM

## 2024-01-25 DIAGNOSIS — M25.552 LEFT HIP PAIN: ICD-10-CM

## 2024-01-25 DIAGNOSIS — C61 PROSTATE CANCER: ICD-10-CM

## 2024-01-25 PROCEDURE — 78306 BONE IMAGING WHOLE BODY: CPT | Mod: 26,,, | Performed by: RADIOLOGY

## 2024-01-25 PROCEDURE — A9503 TC99M MEDRONATE: HCPCS

## 2024-01-26 DIAGNOSIS — M54.41 ACUTE BILATERAL LOW BACK PAIN WITH RIGHT-SIDED SCIATICA: ICD-10-CM

## 2024-01-26 DIAGNOSIS — M46.1 SACROILIITIS: Primary | ICD-10-CM

## 2024-02-02 ENCOUNTER — OFFICE VISIT (OUTPATIENT)
Dept: INTERNAL MEDICINE | Facility: CLINIC | Age: 79
End: 2024-02-02
Payer: MEDICARE

## 2024-02-02 VITALS
RESPIRATION RATE: 20 BRPM | HEART RATE: 86 BPM | SYSTOLIC BLOOD PRESSURE: 120 MMHG | BODY MASS INDEX: 40.52 KG/M2 | DIASTOLIC BLOOD PRESSURE: 76 MMHG | OXYGEN SATURATION: 96 % | WEIGHT: 299.19 LBS | HEIGHT: 72 IN

## 2024-02-02 DIAGNOSIS — E66.01 CLASS 2 SEVERE OBESITY DUE TO EXCESS CALORIES WITH SERIOUS COMORBIDITY AND BODY MASS INDEX (BMI) OF 36.0 TO 36.9 IN ADULT: ICD-10-CM

## 2024-02-02 DIAGNOSIS — M46.1 SACROILIITIS: Primary | ICD-10-CM

## 2024-02-02 DIAGNOSIS — I70.0 AORTIC ATHEROSCLEROSIS: ICD-10-CM

## 2024-02-02 DIAGNOSIS — M54.41 ACUTE BILATERAL LOW BACK PAIN WITH RIGHT-SIDED SCIATICA: ICD-10-CM

## 2024-02-02 DIAGNOSIS — M25.552 LEFT HIP PAIN: ICD-10-CM

## 2024-02-02 PROCEDURE — 99999 PR PBB SHADOW E&M-EST. PATIENT-LVL V: CPT | Mod: PBBFAC,,, | Performed by: FAMILY MEDICINE

## 2024-02-02 PROCEDURE — 1160F RVW MEDS BY RX/DR IN RCRD: CPT | Mod: CPTII,S$GLB,, | Performed by: FAMILY MEDICINE

## 2024-02-02 PROCEDURE — 1125F AMNT PAIN NOTED PAIN PRSNT: CPT | Mod: CPTII,S$GLB,, | Performed by: FAMILY MEDICINE

## 2024-02-02 PROCEDURE — 1101F PT FALLS ASSESS-DOCD LE1/YR: CPT | Mod: CPTII,S$GLB,, | Performed by: FAMILY MEDICINE

## 2024-02-02 PROCEDURE — 99214 OFFICE O/P EST MOD 30 MIN: CPT | Mod: S$GLB,,, | Performed by: FAMILY MEDICINE

## 2024-02-02 PROCEDURE — 1159F MED LIST DOCD IN RCRD: CPT | Mod: CPTII,S$GLB,, | Performed by: FAMILY MEDICINE

## 2024-02-02 PROCEDURE — 3078F DIAST BP <80 MM HG: CPT | Mod: CPTII,S$GLB,, | Performed by: FAMILY MEDICINE

## 2024-02-02 PROCEDURE — 3074F SYST BP LT 130 MM HG: CPT | Mod: CPTII,S$GLB,, | Performed by: FAMILY MEDICINE

## 2024-02-02 PROCEDURE — 3288F FALL RISK ASSESSMENT DOCD: CPT | Mod: CPTII,S$GLB,, | Performed by: FAMILY MEDICINE

## 2024-02-02 RX ORDER — FUROSEMIDE 40 MG/1
40 TABLET ORAL EVERY OTHER DAY
COMMUNITY

## 2024-02-02 RX ORDER — VALSARTAN 40 MG/1
40 TABLET ORAL
COMMUNITY
End: 2024-02-23 | Stop reason: SDUPTHER

## 2024-02-02 NOTE — PROGRESS NOTES
Subjective:       Patient ID: Alejo Melvin is a 78 y.o. male.    Chief Complaint: Follow-up (2 week)    Patient was seen 8 weeks ago for right hip and right sacroiliac pain.  I gave him a steroid shot which helped for few days.  He is now on Celebrex but the pain persists.  He has a history of prostate cancer.  Bone scan consistant with arthritis.  He is still having pain but it seems be 50% better.  He was scheduled for physical therapy next week.  He still has pain radiating into his left buttock    Hip Pain   The incident occurred more than 1 week ago. The incident occurred in the yard. The injury mechanism was a twisting injury. The pain is present in the left hip. The pain is at a severity of 4/10. The pain is moderate. The pain has been Fluctuating since onset. Associated symptoms include an inability to bear weight. Pertinent negatives include no numbness. The symptoms are aggravated by weight bearing and movement. He has tried acetaminophen for the symptoms. The treatment provided mild relief.   Follow-up  Associated symptoms include arthralgias. Pertinent negatives include no abdominal pain, chest pain, coughing, joint swelling, numbness or rash.     Review of Systems   Constitutional:  Negative for unexpected weight change.   Respiratory:  Negative for cough, chest tightness and shortness of breath.    Cardiovascular:  Negative for chest pain and leg swelling.   Gastrointestinal:  Negative for abdominal pain.   Genitourinary:  Negative for difficulty urinating.   Musculoskeletal:  Positive for arthralgias and back pain. Negative for joint swelling.   Skin:  Negative for rash.   Neurological:  Negative for numbness.   Hematological:  Negative for adenopathy.       Objective:      Vitals:    02/02/24 0852   BP: 120/76   Pulse: 86   Resp: 20     Physical Exam  Constitutional:       Appearance: Normal appearance. He is well-developed. He is obese.   Cardiovascular:      Rate and Rhythm: Normal rate and  regular rhythm.      Heart sounds: Normal heart sounds. No murmur heard.     No gallop.   Pulmonary:      Effort: Pulmonary effort is normal.      Breath sounds: Normal breath sounds.   Musculoskeletal:         General: Tenderness present.      Thoracic back: No scoliosis.      Lumbar back: Tenderness and bony tenderness present. Normal range of motion. Negative right straight leg raise test and negative left straight leg raise test. No scoliosis.        Back:       Right hip: No tenderness.      Left hip: No tenderness.      Right lower leg: No edema.      Left lower leg: No edema.        Legs:       Comments: Negative straight leg raise   Neurological:      General: No focal deficit present.      Mental Status: He is alert and oriented to person, place, and time.      Deep Tendon Reflexes: Reflexes are normal and symmetric.         Assessment:       1. Sacroiliitis    2. Acute bilateral low back pain with right-sided sciatica    3. Left hip pain    4. Aortic atherosclerosis    5. Class 2 severe obesity due to excess calories with serious comorbidity and body mass index (BMI) of 36.0 to 36.9 in adult            Plan:   1. Sacroiliitis  Overview:  Start physical therapy   Continue Celebrex   Re-evaluate in 3 weeks.  He may need CT imaging, pain management.      2. Acute bilateral low back pain with right-sided sciatica  Overview:  Start physical therapy   Continue Celebrex   Recheck in 3 weeks.  If not better obtain CT scan of the lumbar spine and start with pain management.      3. Left hip pain  Overview:  Let us see how physical therapy does   Continue Celebrex      4. Aortic atherosclerosis  Overview:  This was seen on previous imaging.  Maximize treatment of cardiovascular risk factors.      5. Class 2 severe obesity due to excess calories with serious comorbidity and body mass index (BMI) of 36.0 to 36.9 in adult  Overview:  Begin progressive daily aerobic exercise program, follow a low fat, low cholesterol  diet, decrease or avoid alcohol intake, reduce salt in diet and cooking, reduce exposure to stress, continue current healthy lifestyle patterns and return for routine annual checkups.               RTC in 3 weeks

## 2024-02-08 ENCOUNTER — CLINICAL SUPPORT (OUTPATIENT)
Dept: REHABILITATION | Facility: HOSPITAL | Age: 79
End: 2024-02-08
Attending: FAMILY MEDICINE
Payer: MEDICARE

## 2024-02-08 DIAGNOSIS — M54.41 ACUTE BILATERAL LOW BACK PAIN WITH RIGHT-SIDED SCIATICA: ICD-10-CM

## 2024-02-08 DIAGNOSIS — M46.1 SACROILIITIS: ICD-10-CM

## 2024-02-08 PROCEDURE — 97110 THERAPEUTIC EXERCISES: CPT | Mod: PN

## 2024-02-08 PROCEDURE — 97161 PT EVAL LOW COMPLEX 20 MIN: CPT | Mod: PN
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
## 2024-02-21 ENCOUNTER — CLINICAL SUPPORT (OUTPATIENT)
Dept: REHABILITATION | Facility: HOSPITAL | Age: 79
End: 2024-02-21
Attending: FAMILY MEDICINE
Payer: MEDICARE

## 2024-02-21 DIAGNOSIS — M46.1 SACROILIITIS: Primary | ICD-10-CM

## 2024-02-21 DIAGNOSIS — M54.41 ACUTE BILATERAL LOW BACK PAIN WITH RIGHT-SIDED SCIATICA: ICD-10-CM

## 2024-02-21 PROCEDURE — 97110 THERAPEUTIC EXERCISES: CPT | Mod: PN

## 2024-02-21 PROCEDURE — 97032 APPL MODALITY 1+ESTIM EA 15: CPT | Mod: PN

## 2024-02-21 NOTE — PROGRESS NOTES
[facility]   Physical Therapy Treatment Note      Name: Alejo Wilder [facility]  Clinic Number: [id number]    Therapy Diagnosis:   Encounter Diagnoses   Name Primary?    Sacroiliitis Yes    Acute bilateral low back pain with right-sided sciatica      Physician: Leroy Alston MD    Visit Date: 2/21/2024    Physician Orders: PT Eval and Treat   Medical Diagnosis from Referral: M46.1 (ICD-10-CM) - Sacroiliitis M54.41 (ICD-10-CM) - Acute bilateral low back pain with right-sided sciatica  Evaluation Date: 2/8/2024  Authorization Period Expiration: 1/25/2025  Plan of Care Expiration: 3/7/2024  Progress Note Due: 2/22/2024  Visit # / Visits authorized: 1220  FOTO: 1/3     Precautions: Standard and pacemaker      Time In: 1330  Time Out: 1410  Total Appointment Time (timed & untimed codes): 40 minutes    PTA Visit #: -/5       Subjective     Patient reports: that he was able to go dancing again.  He was compliant with home exercise program.  Response to previous treatment: positive  Functional change: minimal    Pain: 5/10  Location: left back      Objective      Objective Measures updated at progress report unless specified.     Treatment     Alejo received the treatments listed below:      therapeutic exercises to develop strength, ROM, flexibility, posture, and core stabilization for 5 minutes including:  2 x 20 seconds B piriformis stretch  1 x 10 TA activation with diaphragmatic breathing   2 x 10 BKTC with SB  2 x 10 lower trunk rotations with SB  2 x 10 red theraband hip abduction  2 x 10 ball squeeze with bridge  manual therapy techniques: Joint mobilizations were applied to the: lumbar spine for 10 minutes, including:  B SI joint FDN with 75mm    neuromuscular re-education activities to improve:  for  minutes. The following activities were included:    therapeutic activities to improve functional performance for     minutes, including:    direct contact modalities after being cleared  for contraindications:     supervised modalities after being cleared for contradictions:     hot pack for 5 minutes to lumbar spine.    cold pack for  minutes to .    Patient Education and Home Exercises       Education provided:   - home exercise program to be performed at home    Written Home Exercises Provided: yes. Exercises were reviewed and Alejo was able to demonstrate them prior to the end of the session.  Alejo demonstrated good  understanding of the education provided. See Electronic Medical Record under Patient Instructions for exercises provided during therapy sessions    Assessment     Patient tolerated today's treatment well of FDN. Progressed with there ex today with no increased reports of pain or discomfort.    Alejo Is progressing well towards his goals.   Patient prognosis is Good.     Patient will continue to benefit from skilled outpatient physical therapy to address the deficits listed in the problem list box on initial evaluation, provide pt/family education and to maximize pt's level of independence in the home and community environment.     Patient's spiritual, cultural and educational needs considered and pt agreeable to plan of care and goals.     Anticipated barriers to physical therapy: none    Goals: Short Term Goals (2 Weeks):  1. Patient will be compliant with home exercise program to supplement therapy in promoting functional mobility.  2. Patient will perform sit to stand with good control to demonstrate improved core strength.  3. Patient will report no pain during thoracolumbar active range of motion to promote functional mobility.  Long Term Goals (4 Weeks):   1. Patient will improve FOTO score to </= 49% limited to decrease perceived limitation with maintaining/changing body position.   2. Patient will perform changes in position with good control to demonstrate improved core strength.  3. Patient will improve core strength and postural stability for overall decreased pain with  community ambulation.        PLAN   Plan of care Certification: 2/8/2024 to 3/7/2024.     Outpatient Physical Therapy 2 times weekly for 4 weeks to include the following interventions: Manual Therapy, Neuromuscular Re-ed, Patient Education, Therapeutic Activities, and Therapeutic Exercise.      Jojo Longoria, PT        I CERTIFY THE NEED FOR THESE SERVICES FURNISHED UNDER THIS PLAN OF TREATMENT AND WHILE UNDER MY CARE   Physician's comments:      Physician's Signature: ___________________________________________________         Jojo Longoria, PT

## 2024-02-23 ENCOUNTER — CLINICAL SUPPORT (OUTPATIENT)
Dept: REHABILITATION | Facility: HOSPITAL | Age: 79
End: 2024-02-23
Attending: FAMILY MEDICINE
Payer: MEDICARE

## 2024-02-23 ENCOUNTER — OFFICE VISIT (OUTPATIENT)
Dept: INTERNAL MEDICINE | Facility: CLINIC | Age: 79
End: 2024-02-23
Payer: MEDICARE

## 2024-02-23 VITALS — HEIGHT: 72 IN | BODY MASS INDEX: 39.18 KG/M2 | HEART RATE: 84 BPM | WEIGHT: 289.25 LBS | RESPIRATION RATE: 20 BRPM

## 2024-02-23 DIAGNOSIS — M54.41 ACUTE BILATERAL LOW BACK PAIN WITH RIGHT-SIDED SCIATICA: ICD-10-CM

## 2024-02-23 DIAGNOSIS — E78.2 MIXED HYPERLIPIDEMIA: ICD-10-CM

## 2024-02-23 DIAGNOSIS — M46.1 SACROILIITIS: Primary | ICD-10-CM

## 2024-02-23 DIAGNOSIS — I95.2 HYPOTENSION DUE TO DRUGS: ICD-10-CM

## 2024-02-23 DIAGNOSIS — M25.552 LEFT HIP PAIN: ICD-10-CM

## 2024-02-23 DIAGNOSIS — I10 PRIMARY HYPERTENSION: ICD-10-CM

## 2024-02-23 PROBLEM — G31.89 OTHER SPECIFIED DEGENERATIVE DISEASES OF NERVOUS SYSTEM: Status: ACTIVE | Noted: 2024-02-23

## 2024-02-23 PROCEDURE — 1125F AMNT PAIN NOTED PAIN PRSNT: CPT | Mod: CPTII,S$GLB,, | Performed by: FAMILY MEDICINE

## 2024-02-23 PROCEDURE — 99999 PR PBB SHADOW E&M-EST. PATIENT-LVL IV: CPT | Mod: PBBFAC,,, | Performed by: FAMILY MEDICINE

## 2024-02-23 PROCEDURE — 99214 OFFICE O/P EST MOD 30 MIN: CPT | Mod: S$GLB,,, | Performed by: FAMILY MEDICINE

## 2024-02-23 PROCEDURE — 3288F FALL RISK ASSESSMENT DOCD: CPT | Mod: CPTII,S$GLB,, | Performed by: FAMILY MEDICINE

## 2024-02-23 PROCEDURE — 97530 THERAPEUTIC ACTIVITIES: CPT | Mod: PN

## 2024-02-23 PROCEDURE — 1101F PT FALLS ASSESS-DOCD LE1/YR: CPT | Mod: CPTII,S$GLB,, | Performed by: FAMILY MEDICINE

## 2024-02-23 PROCEDURE — 97110 THERAPEUTIC EXERCISES: CPT | Mod: PN

## 2024-02-23 PROCEDURE — 1160F RVW MEDS BY RX/DR IN RCRD: CPT | Mod: CPTII,S$GLB,, | Performed by: FAMILY MEDICINE

## 2024-02-23 PROCEDURE — 1159F MED LIST DOCD IN RCRD: CPT | Mod: CPTII,S$GLB,, | Performed by: FAMILY MEDICINE

## 2024-02-23 RX ORDER — VALSARTAN 40 MG/1
40 TABLET ORAL NIGHTLY
Start: 2024-02-23

## 2024-02-23 RX ORDER — DENOSUMAB 60 MG/ML
INJECTION SUBCUTANEOUS
COMMUNITY

## 2024-02-23 NOTE — PROGRESS NOTES
[facility]   Physical Therapy Treatment Note      Name: Alejo Wilder [facility]  Clinic Number: [id number]    Therapy Diagnosis:   Encounter Diagnoses   Name Primary?    Sacroiliitis Yes    Acute bilateral low back pain with right-sided sciatica        Physician: Leroy Alston MD    Visit Date: 2/23/2024    Physician Orders: PT Eval and Treat   Medical Diagnosis from Referral: M46.1 (ICD-10-CM) - Sacroiliitis M54.41 (ICD-10-CM) - Acute bilateral low back pain with right-sided sciatica  Evaluation Date: 2/8/2024  Authorization Period Expiration: 1/25/2025  Plan of Care Expiration: 3/7/2024  Progress Note Due: 2/22/2024  Visit # / Visits authorized: 1220  FOTO: 1/3     Precautions: Standard and pacemaker      Time In: 1330  Time Out: 1410  Total Appointment Time (timed & untimed codes): 40 minutes    PTA Visit #: -/5       Subjective     Patient reports: that his back has been feeling better.  He was compliant with home exercise program.  Response to previous treatment: positive  Functional change: minimal    Pain: 5/10  Location: left back      Objective      Objective Measures updated at progress report unless specified.     Treatment     Alejo received the treatments listed below:      therapeutic exercises to develop strength, ROM, flexibility, posture, and core stabilization for 5 minutes including:  2 x 20 seconds B piriformis stretch  1 x 10 TA activation with diaphragmatic breathing   2 x 10 BKTC with SB  2 x 10 lower trunk rotations with SB  2 x 10 red theraband hip abduction  2 x 10 ball squeeze with bridge  manual therapy techniques: Joint mobilizations were applied to the: lumbar spine for 10 minutes, including:  B SI joint FDN with 75mm    neuromuscular re-education activities to improve:  for  minutes. The following activities were included:    therapeutic activities to improve functional performance for     minutes, including:    direct contact modalities after being  cleared for contraindications:     supervised modalities after being cleared for contradictions:     hot pack for 5 minutes to lumbar spine.    cold pack for  minutes to .    Patient Education and Home Exercises       Education provided:   - home exercise program to be performed at home    Written Home Exercises Provided: yes. Exercises were reviewed and Alejo was able to demonstrate them prior to the end of the session.  Alejo demonstrated good  understanding of the education provided. See Electronic Medical Record under Patient Instructions for exercises provided during therapy sessions    Assessment     Patient tolerated today's treatment well of FDN. Progressed with there ex today with no increased reports of pain or discomfort.    Alejo Is progressing well towards his goals.   Patient prognosis is Good.     Patient will continue to benefit from skilled outpatient physical therapy to address the deficits listed in the problem list box on initial evaluation, provide pt/family education and to maximize pt's level of independence in the home and community environment.     Patient's spiritual, cultural and educational needs considered and pt agreeable to plan of care and goals.     Anticipated barriers to physical therapy: none    Goals: Short Term Goals (2 Weeks):  1. Patient will be compliant with home exercise program to supplement therapy in promoting functional mobility.  2. Patient will perform sit to stand with good control to demonstrate improved core strength.  3. Patient will report no pain during thoracolumbar active range of motion to promote functional mobility.  Long Term Goals (4 Weeks):   1. Patient will improve FOTO score to </= 49% limited to decrease perceived limitation with maintaining/changing body position.   2. Patient will perform changes in position with good control to demonstrate improved core strength.  3. Patient will improve core strength and postural stability for overall decreased  pain with community ambulation.        PLAN   Plan of care Certification: 2/8/2024 to 3/7/2024.     Outpatient Physical Therapy 2 times weekly for 4 weeks to include the following interventions: Manual Therapy, Neuromuscular Re-ed, Patient Education, Therapeutic Activities, and Therapeutic Exercise.      Jojo Longoria, PT        I CERTIFY THE NEED FOR THESE SERVICES FURNISHED UNDER THIS PLAN OF TREATMENT AND WHILE UNDER MY CARE   Physician's comments:      Physician's Signature: ___________________________________________________         Jojo Longoria, PT

## 2024-02-23 NOTE — PROGRESS NOTES
Subjective:       Patient ID: Alejo Melvin is a 78 y.o. male.    Chief Complaint: Follow-up (3 week)    Patient was seen 8 weeks ago for right hip and right sacroiliac pain.  I gave him a steroid shot which helped for few days.  He is now on Celebrex but the pain persists.  He has a history of prostate cancer.  Bone scan consistant with arthritis.  He is still having pain but it seems be 50% better.  He was scheduled for physical therapy next week.  He still has pain radiating into his left buttock    Hip Pain   The incident occurred more than 1 week ago. The incident occurred in the yard. The injury mechanism was a twisting injury. The pain is present in the left hip. The pain is at a severity of 4/10. The pain is moderate. The pain has been Fluctuating since onset. Associated symptoms include an inability to bear weight. Pertinent negatives include no numbness. The symptoms are aggravated by weight bearing and movement. He has tried acetaminophen for the symptoms. The treatment provided mild relief.   Follow-up  Associated symptoms include arthralgias. Pertinent negatives include no abdominal pain, chest pain, coughing, joint swelling, numbness or rash.     Review of Systems   Constitutional:  Negative for unexpected weight change.   Respiratory:  Negative for cough, chest tightness and shortness of breath.    Cardiovascular:  Negative for chest pain and leg swelling.   Gastrointestinal:  Negative for abdominal pain.   Genitourinary:  Negative for difficulty urinating.   Musculoskeletal:  Positive for arthralgias and back pain. Negative for joint swelling.   Skin:  Negative for rash.   Neurological:  Negative for numbness.   Hematological:  Negative for adenopathy.       Objective:      Vitals:    02/23/24 0839   BP: (P) 118/68   Pulse: 84   Resp: 20     Physical Exam  Constitutional:       Appearance: Normal appearance. He is well-developed. He is obese.   Cardiovascular:      Rate and Rhythm: Normal rate  and regular rhythm.      Heart sounds: Normal heart sounds. No murmur heard.     No gallop.   Pulmonary:      Effort: Pulmonary effort is normal.      Breath sounds: Normal breath sounds.   Musculoskeletal:         General: Tenderness present.      Thoracic back: No scoliosis.      Lumbar back: Tenderness and bony tenderness present. Normal range of motion. Negative right straight leg raise test and negative left straight leg raise test. No scoliosis.        Back:       Right hip: No tenderness.      Left hip: No tenderness.      Right lower leg: No edema.      Left lower leg: No edema.        Legs:       Comments: Negative straight leg raise   Neurological:      General: No focal deficit present.      Mental Status: He is alert and oriented to person, place, and time.      Deep Tendon Reflexes: Reflexes are normal and symmetric.         Assessment:       1. Sacroiliitis    2. Acute bilateral low back pain with right-sided sciatica    3. Left hip pain    4. Hypotension due to drugs    5. Primary hypertension    6. Mixed hyperlipidemia            Plan:   1. Sacroiliitis  Overview:  Continue physical therapy   Continue Celebrex   Re-evaluate in 3 weeks.  He may need CT imaging, pain management.      2. Acute bilateral low back pain with right-sided sciatica  Overview:  Continue physical therapy   Continue Celebrex   RTC as needed      3. Left hip pain  Overview:  Continue Celebrex as needed  Continue PT      4. Hypotension due to drugs  Overview:  Continue support stockings  Monitor blood pressure daily  Decrease Valsartan to 40 mg at night        5. Primary hypertension  Overview:  Two gram sodium diet.    Weight loss discussed.    Try to walk 2 miles per day.    Avoid smoking.    Current medications will be:  - Stop amlodipine 5 mg po daily.  Restart if S BP > 140.        Valsartan  40 mg at night  - aspirin (ECOTRIN) 81 MG EC tablet; Take 1 tablet (81 mg total) by mouth 2 (two) times daily.  -     Lasix 40 mg 2  tabs daily  - continue spironolactone 25 mg daily  Hold meds if BP less than 120      Orders:  -     valsartan (DIOVAN) 40 MG tablet; Take 1 tablet (40 mg total) by mouth every evening.    6. Mixed hyperlipidemia  Overview:  Low fat diet.  Avoid sweets.  A 10 pound weight loss by the next visit as a  good goal.  Increase consumption of fruits and vegetables, fish and chicken.  Use medications below:        fish oil-omega-3 fatty acids 300-1,000 mg capsule; Take 2 capsules (2 g total) by mouth once daily.  - Rosuvastatin 40 mg by mouth daily  -     Zetia 10 mg daily        RTC in 3 months

## 2024-02-28 DIAGNOSIS — M46.1 SACROILIITIS: ICD-10-CM

## 2024-02-29 RX ORDER — CELECOXIB 200 MG/1
200 CAPSULE ORAL
Qty: 30 CAPSULE | Refills: 1 | Status: SHIPPED | OUTPATIENT
Start: 2024-02-29 | End: 2024-03-26

## 2024-03-04 ENCOUNTER — CLINICAL SUPPORT (OUTPATIENT)
Dept: REHABILITATION | Facility: HOSPITAL | Age: 79
End: 2024-03-04
Payer: MEDICARE

## 2024-03-04 DIAGNOSIS — M46.1 SACROILIITIS: Primary | ICD-10-CM

## 2024-03-04 PROCEDURE — 97110 THERAPEUTIC EXERCISES: CPT | Mod: PN,CQ

## 2024-03-04 NOTE — PROGRESS NOTES
"[facility]   Physical Therapy Treatment Note      Name: Alejo Wilder [facility]  Clinic Number: [id number]    Therapy Diagnosis:   No diagnosis found.      Physician: Leroy Alston MD    Visit Date: 3/4/2024    Physician Orders: PT Eval and Treat   Medical Diagnosis from Referral: M46.1 (ICD-10-CM) - Sacroiliitis M54.41 (ICD-10-CM) - Acute bilateral low back pain with right-sided sciatica  Evaluation Date: 2/8/2024  Authorization Period Expiration: 1/25/2025  Plan of Care Expiration: 3/7/2024  Progress Note Due: 2/22/2024  Visit # / Visits authorized: 1220  FOTO: 1/3     Precautions: Standard and pacemaker      Time In: 1013  Time Out: 1106  Total Appointment Time (timed & untimed codes): 53 minutes    PTA Visit #: 1/5       Subjective     Patient reports: 10' limitation during sitting. L hip > R hip.   He was compliant with home exercise program.  Response to previous treatment: positive  Functional change: minimal    Pain: 3/10  Location: left back, L hip    Objective      Objective Measures updated at progress report unless specified.     Treatment     Alejo received the treatments listed below:      therapeutic exercises to develop strength, ROM, flexibility, posture, and core stabilization for 38 minutes including:  3 x 20 seconds B piriformis stretch  1 x 10 TA activation with diaphragmatic breathing  2 x 10 BKTC with SB  2 x 10 lower trunk rotations with SB  2 x 10 red theraband hip abduction  2 x 10 ball squeeze with bridge  10 x 5" hold seated forward green pball rolls   5 x 5" hold seated lateral green pball rolls    manual therapy techniques: Joint mobilizations were applied to the: lumbar spine for 10 minutes, including:  B SI joint FDN with 75mm - Performed by supervising PTJojo    neuromuscular re-education activities to improve: for  minutes. The following activities were included:    therapeutic activities to improve functional performance for    minutes, " including:    direct contact modalities after being cleared for contraindications:     supervised modalities after being cleared for contradictions:     hot pack for  minutes to lumbar spine.    cold pack for  minutes to .    Patient Education and Home Exercises       Education provided:   - home exercise program to be performed at home    Written Home Exercises Provided: yes. Exercises were reviewed and Alejo was able to demonstrate them prior to the end of the session.  Alejo demonstrated good  understanding of the education provided. See Electronic Medical Record under Patient Instructions for exercises provided during therapy sessions    Assessment     TDN performed by PT at the end of session. Post session, pt reports PT has really been helping him, and he is experiencing less discomfort overall.   Alejo Is progressing well towards his goals.   Patient prognosis is Good.     Patient will continue to benefit from skilled outpatient physical therapy to address the deficits listed in the problem list box on initial evaluation, provide pt/family education and to maximize pt's level of independence in the home and community environment.     Patient's spiritual, cultural and educational needs considered and pt agreeable to plan of care and goals.     Anticipated barriers to physical therapy: none    Goals: Short Term Goals (2 Weeks):  1. Patient will be compliant with home exercise program to supplement therapy in promoting functional mobility.  2. Patient will perform sit to stand with good control to demonstrate improved core strength.  3. Patient will report no pain during thoracolumbar active range of motion to promote functional mobility.  Long Term Goals (4 Weeks):   1. Patient will improve FOTO score to </= 49% limited to decrease perceived limitation with maintaining/changing body position.   2. Patient will perform changes in position with good control to demonstrate improved core strength.  3. Patient will  improve core strength and postural stability for overall decreased pain with community ambulation.        PLAN   Plan of care Certification: 2/8/2024 to 3/7/2024.     Outpatient Physical Therapy 2 times weekly for 4 weeks to include the following interventions: Manual Therapy, Neuromuscular Re-ed, Patient Education, Therapeutic Activities, and Therapeutic Exercise.      Jojo Longoria, PT     Mirna Escobar, PTA

## 2024-03-11 ENCOUNTER — CLINICAL SUPPORT (OUTPATIENT)
Dept: REHABILITATION | Facility: HOSPITAL | Age: 79
End: 2024-03-11
Payer: MEDICARE

## 2024-03-11 DIAGNOSIS — M46.1 SACROILIITIS: Primary | ICD-10-CM

## 2024-03-11 PROCEDURE — 97530 THERAPEUTIC ACTIVITIES: CPT | Mod: PN,CQ

## 2024-03-11 PROCEDURE — 97140 MANUAL THERAPY 1/> REGIONS: CPT | Mod: PN,CQ

## 2024-03-11 NOTE — PROGRESS NOTES
"[facility]   Physical Therapy Treatment Note      Name: Alejo Wilder [facility]  Clinic Number: [id number]    Therapy Diagnosis:   No diagnosis found.      Physician: Leroy Alston MD    Visit Date: 3/11/2024    Physician Orders: PT Eval and Treat   Medical Diagnosis from Referral: M46.1 (ICD-10-CM) - Sacroiliitis M54.41 (ICD-10-CM) - Acute bilateral low back pain with right-sided sciatica  Evaluation Date: 2/8/2024  Authorization Period Expiration: 1/25/2025  Plan of Care Expiration: 3/7/2024  Progress Note Due: 2/22/2024  Visit # / Visits authorized: 1220  FOTO: 1/3     Precautions: Standard and pacemaker      Time In: 1032  Time Out: 1100  Total Appointment Time (timed & untimed codes): 28 minutes (23' billable)    PTA Visit #: 2/5       Subjective     Patient reports: pain is now only when standing or anything vertical. He states the needling has helped him the most.   He was compliant with home exercise program.  Response to previous treatment: positive  Functional change: minimal    Pain: 1/10  Location: left back, L hip    Objective      Objective Measures updated at progress report unless specified.     Treatment     Alejo received the treatments listed below:      therapeutic exercises to develop strength, ROM, flexibility, posture, and core stabilization for 0 minutes including:  3 x 20 seconds B piriformis stretch  1 x 10 TA activation with diaphragmatic breathing  2 x 10 BKTC with SB  2 x 10 lower trunk rotations with SB  2 x 10 red theraband hip abduction  2 x 10 ball squeeze with bridge  10 x 5" hold seated forward green pball rolls   5 x 5" hold seated lateral green pball rolls    manual therapy techniques: Joint mobilizations were applied to the: lumbar spine for 15 minutes, including:  B SI joint FDN with 75mm - Performed by supervising PTJojo    neuromuscular re-education activities to improve: for  minutes. The following activities were " included:    therapeutic activities to improve functional performance for 8   minutes, including:  Positional changes    direct contact modalities after being cleared for contraindications:     supervised modalities after being cleared for contradictions:     hot pack for 5 minutes to lumbar spine.    cold pack for  minutes to .    Patient Education and Home Exercises       Education provided:   - home exercise program to be performed at home    Written Home Exercises Provided: yes. Exercises were reviewed and Alejo was able to demonstrate them prior to the end of the session.  Alejo demonstrated good  understanding of the education provided. See Electronic Medical Record under Patient Instructions for exercises provided during therapy sessions    Assessment   Time limitation today resulting in treatment consisting mostly of TDN performed by supervising PT, Jojo Longoria with good response.     Alejo Is progressing well towards his goals.   Patient prognosis is Good.     Patient will continue to benefit from skilled outpatient physical therapy to address the deficits listed in the problem list box on initial evaluation, provide pt/family education and to maximize pt's level of independence in the home and community environment.     Patient's spiritual, cultural and educational needs considered and pt agreeable to plan of care and goals.     Anticipated barriers to physical therapy: none    Goals: Short Term Goals (2 Weeks):  1. Patient will be compliant with home exercise program to supplement therapy in promoting functional mobility.  2. Patient will perform sit to stand with good control to demonstrate improved core strength.  3. Patient will report no pain during thoracolumbar active range of motion to promote functional mobility.  Long Term Goals (4 Weeks):   1. Patient will improve FOTO score to </= 49% limited to decrease perceived limitation with maintaining/changing body position.   2. Patient will perform  changes in position with good control to demonstrate improved core strength.  3. Patient will improve core strength and postural stability for overall decreased pain with community ambulation.        PLAN   Plan of care Certification: 2/8/2024 to 3/7/2024.     Outpatient Physical Therapy 2 times weekly for 4 weeks to include the following interventions: Manual Therapy, Neuromuscular Re-ed, Patient Education, Therapeutic Activities, and Therapeutic Exercise.      Jojo Longoria, PT     Minra Escobar, PTA

## 2024-03-13 ENCOUNTER — CLINICAL SUPPORT (OUTPATIENT)
Dept: REHABILITATION | Facility: HOSPITAL | Age: 79
End: 2024-03-13
Payer: MEDICARE

## 2024-03-13 DIAGNOSIS — M54.41 ACUTE BILATERAL LOW BACK PAIN WITH RIGHT-SIDED SCIATICA: ICD-10-CM

## 2024-03-13 DIAGNOSIS — M46.1 SACROILIITIS: Primary | ICD-10-CM

## 2024-03-13 PROCEDURE — 97110 THERAPEUTIC EXERCISES: CPT | Mod: PN

## 2024-03-13 PROCEDURE — 97530 THERAPEUTIC ACTIVITIES: CPT | Mod: PN

## 2024-03-13 NOTE — PROGRESS NOTES
"[facility]   Physical Therapy Treatment Note      Name: Alejo Wilder [facility]  Clinic Number: [id number]    Therapy Diagnosis:   Encounter Diagnoses   Name Primary?    Sacroiliitis Yes    Acute bilateral low back pain with right-sided sciatica          Physician: Leroy Alston MD    Visit Date: 3/13/2024    Physician Orders: PT Eval and Treat   Medical Diagnosis from Referral: M46.1 (ICD-10-CM) - Sacroiliitis M54.41 (ICD-10-CM) - Acute bilateral low back pain with right-sided sciatica  Evaluation Date: 2/8/2024  Authorization Period Expiration: 1/25/2025  Plan of Care Expiration: 3/7/2024  Progress Note Due: 2/22/2024  Visit # / Visits authorized: 6/8  FOTO: 1/3     Precautions: Standard and pacemaker      Time In: 1000  Time Out: 1040  Total Appointment Time (timed & untimed codes): 40 min    PTA Visit #: -/5       Subjective     Patient reports: pain is now only when standing or anything vertical. He states the needling has helped him the most.   He was compliant with home exercise program.  Response to previous treatment: positive  Functional change: minimal    Pain: 1/10  Location: left back, L hip    Objective      Objective Measures updated at progress report unless specified.     Treatment     Alejo received the treatments listed below:      therapeutic exercises to develop strength, ROM, flexibility, posture, and core stabilization for 0 minutes including:  3 x 20 seconds B piriformis stretch  1 x 10 TA activation with diaphragmatic breathing  2 x 10 BKTC with SB  2 x 10 lower trunk rotations with SB  2 x 10 green theraband hip abduction  2 x 10 ball squeeze with bridge  10 x 5" hold seated forward green pball rolls   5 x 5" hold seated lateral green pball rolls  5' recumbent bike Level 3    manual therapy techniques: Joint mobilizations were applied to the: lumbar spine for 15 minutes, including:  Right glute med 2- FDN with 75mm     neuromuscular re-education activities to " improve: for  minutes. The following activities were included:    therapeutic activities to improve functional performance for 8   minutes, including:  Positional changes    direct contact modalities after being cleared for contraindications:     supervised modalities after being cleared for contradictions:     hot pack for 5 minutes to lumbar spine.    cold pack for  minutes to .    Patient Education and Home Exercises       Education provided:   - home exercise program to be performed at home    Written Home Exercises Provided: yes. Exercises were reviewed and Alejo was able to demonstrate them prior to the end of the session.  Alejo demonstrated good  understanding of the education provided. See Electronic Medical Record under Patient Instructions for exercises provided during therapy sessions    Assessment   Patient tolerated today's treatment well with mild reports of complaints with changing positions. Added recumbent bike with no increased reports of pain.    Alejo Is progressing well towards his goals.   Patient prognosis is Good.     Patient will continue to benefit from skilled outpatient physical therapy to address the deficits listed in the problem list box on initial evaluation, provide pt/family education and to maximize pt's level of independence in the home and community environment.     Patient's spiritual, cultural and educational needs considered and pt agreeable to plan of care and goals.     Anticipated barriers to physical therapy: none    Goals: Short Term Goals (2 Weeks):  1. Patient will be compliant with home exercise program to supplement therapy in promoting functional mobility.  2. Patient will perform sit to stand with good control to demonstrate improved core strength.  3. Patient will report no pain during thoracolumbar active range of motion to promote functional mobility.  Long Term Goals (4 Weeks):   1. Patient will improve FOTO score to </= 49% limited to decrease perceived  limitation with maintaining/changing body position.   2. Patient will perform changes in position with good control to demonstrate improved core strength.  3. Patient will improve core strength and postural stability for overall decreased pain with community ambulation.        PLAN   Plan of care Certification: 2/8/2024 to 3/7/2024.     Outpatient Physical Therapy 2 times weekly for 4 weeks to include the following interventions: Manual Therapy, Neuromuscular Re-ed, Patient Education, Therapeutic Activities, and Therapeutic Exercise.      Jojo Longoria, PT     Jojo Longoria, PT

## 2024-03-17 ENCOUNTER — PATIENT MESSAGE (OUTPATIENT)
Dept: OTHER | Facility: OTHER | Age: 79
End: 2024-03-17
Payer: MEDICARE

## 2024-03-18 ENCOUNTER — PATIENT MESSAGE (OUTPATIENT)
Dept: ADMINISTRATIVE | Facility: OTHER | Age: 79
End: 2024-03-18
Payer: MEDICARE

## 2024-03-26 ENCOUNTER — OFFICE VISIT (OUTPATIENT)
Dept: INTERNAL MEDICINE | Facility: CLINIC | Age: 79
End: 2024-03-26
Payer: MEDICARE

## 2024-03-26 VITALS
BODY MASS INDEX: 40.61 KG/M2 | HEIGHT: 72 IN | WEIGHT: 299.81 LBS | RESPIRATION RATE: 18 BRPM | DIASTOLIC BLOOD PRESSURE: 70 MMHG | SYSTOLIC BLOOD PRESSURE: 118 MMHG | HEART RATE: 60 BPM

## 2024-03-26 DIAGNOSIS — M46.1 SACROILIITIS: ICD-10-CM

## 2024-03-26 DIAGNOSIS — E66.01 CLASS 2 SEVERE OBESITY DUE TO EXCESS CALORIES WITH SERIOUS COMORBIDITY AND BODY MASS INDEX (BMI) OF 36.0 TO 36.9 IN ADULT: ICD-10-CM

## 2024-03-26 DIAGNOSIS — I70.0 AORTIC ATHEROSCLEROSIS: ICD-10-CM

## 2024-03-26 DIAGNOSIS — G62.9 POLYNEUROPATHY: ICD-10-CM

## 2024-03-26 DIAGNOSIS — Z00.00 ENCOUNTER FOR PREVENTIVE HEALTH EXAMINATION: ICD-10-CM

## 2024-03-26 DIAGNOSIS — Z95.0 PACEMAKER: ICD-10-CM

## 2024-03-26 DIAGNOSIS — E78.2 MIXED HYPERLIPIDEMIA: ICD-10-CM

## 2024-03-26 DIAGNOSIS — S40.812A ABRASION OF LEFT ARM, INITIAL ENCOUNTER: ICD-10-CM

## 2024-03-26 DIAGNOSIS — C61 PROSTATE CANCER: ICD-10-CM

## 2024-03-26 DIAGNOSIS — D69.2 SENILE PURPURA: ICD-10-CM

## 2024-03-26 DIAGNOSIS — R26.9 ABNORMALITY OF GAIT AND MOBILITY: Primary | ICD-10-CM

## 2024-03-26 DIAGNOSIS — G47.33 OSA ON CPAP: ICD-10-CM

## 2024-03-26 DIAGNOSIS — I10 PRIMARY HYPERTENSION: ICD-10-CM

## 2024-03-26 PROCEDURE — 3288F FALL RISK ASSESSMENT DOCD: CPT | Mod: CPTII,S$GLB,, | Performed by: NURSE PRACTITIONER

## 2024-03-26 PROCEDURE — 99999 PR PBB SHADOW E&M-EST. PATIENT-LVL V: CPT | Mod: PBBFAC,,, | Performed by: NURSE PRACTITIONER

## 2024-03-26 PROCEDURE — 1125F AMNT PAIN NOTED PAIN PRSNT: CPT | Mod: CPTII,S$GLB,, | Performed by: NURSE PRACTITIONER

## 2024-03-26 PROCEDURE — 3078F DIAST BP <80 MM HG: CPT | Mod: CPTII,S$GLB,, | Performed by: NURSE PRACTITIONER

## 2024-03-26 PROCEDURE — 1100F PTFALLS ASSESS-DOCD GE2>/YR: CPT | Mod: CPTII,S$GLB,, | Performed by: NURSE PRACTITIONER

## 2024-03-26 PROCEDURE — 90715 TDAP VACCINE 7 YRS/> IM: CPT | Mod: S$GLB,,, | Performed by: NURSE PRACTITIONER

## 2024-03-26 PROCEDURE — 3074F SYST BP LT 130 MM HG: CPT | Mod: CPTII,S$GLB,, | Performed by: NURSE PRACTITIONER

## 2024-03-26 PROCEDURE — G0439 PPPS, SUBSEQ VISIT: HCPCS | Mod: S$GLB,,, | Performed by: NURSE PRACTITIONER

## 2024-03-26 PROCEDURE — 1160F RVW MEDS BY RX/DR IN RCRD: CPT | Mod: CPTII,S$GLB,, | Performed by: NURSE PRACTITIONER

## 2024-03-26 PROCEDURE — 1159F MED LIST DOCD IN RCRD: CPT | Mod: CPTII,S$GLB,, | Performed by: NURSE PRACTITIONER

## 2024-03-26 PROCEDURE — 1170F FXNL STATUS ASSESSED: CPT | Mod: CPTII,S$GLB,, | Performed by: NURSE PRACTITIONER

## 2024-03-26 PROCEDURE — 90471 IMMUNIZATION ADMIN: CPT | Mod: S$GLB,,, | Performed by: NURSE PRACTITIONER

## 2024-03-26 NOTE — PROGRESS NOTES
lAejo Melvin presented for a  Medicare AWV and comprehensive Health Risk Assessment today. The following components were reviewed and updated:    Medical history  Family History  Social history  Allergies and Current Medications  Health Risk Assessment  Health Maintenance  Care Team         ** See Completed Assessments for Annual Wellness Visit within the encounter summary.**         The following assessments were completed:  Living Situation  CAGE  Depression Screening  Timed Get Up and Go  Whisper Test  Cognitive Function Screening  Nutrition Screening  ADL Screening  PAQ Screening      Opioid documentation:      Patient does not have a current opioid prescription.      Fills gabapentin monthly 300mg po TID 90 capsules   Was filling norco 9/2023 x 1 month > ut none since   Vitals:    03/26/24 1529   BP: 118/70   Pulse: 60   Resp: 18   Weight: 136 kg (299 lb 13.2 oz)   Height: 6' (1.829 m)     Body mass index is 40.66 kg/m².  Physical Exam  Vitals and nursing note reviewed.   Constitutional:       Appearance: He is obese.   HENT:      Head: Normocephalic and atraumatic.   Cardiovascular:      Rate and Rhythm: Normal rate and regular rhythm.      Heart sounds: No murmur heard.  Pulmonary:      Effort: Pulmonary effort is normal.      Breath sounds: Normal breath sounds.   Abdominal:      General: There is no distension.      Palpations: Abdomen is soft.   Musculoskeletal:         General: No swelling. Normal range of motion.   Skin:     General: Skin is warm and dry.      Capillary Refill: Capillary refill takes less than 2 seconds.      Findings: Bruising present.   Neurological:      General: No focal deficit present.      Mental Status: He is alert.   Psychiatric:         Mood and Affect: Mood normal.         Behavior: Behavior normal.         Thought Content: Thought content normal.         Judgment: Judgment normal.               Diagnoses and health risks identified today and associated  recommendations/orders:    1. Encounter for preventive health examination  He follows with Charu/surjit at hematology oncology as well as Arielle with urology for prostate ca 2 years ago and has PSA followed there had radiation now on chemo po  He also follows with PCP and had artur panel 12/2023 2023 Colonoscopy tubular adenoma noted repeat due in 3 years   Tdap - today for abrasion to left arm  Otherwise utd with vaccines     2. Abnormality of gait and mobility  Has sciatic leg pain and neuropathy- trouble with stairs but does not use any assistnve devices for walking     3. Senile purpura  Noted on exam see photo  Lab Results   Component Value Date    WBC 6.88 01/19/2024    HGB 12.9 (L) 01/19/2024    HCT 38.8 (L) 01/19/2024     (H) 01/19/2024     01/19/2024   On asa         4. Abrasion of left arm, initial encounter  Unsure what he hit but bruising and skin abrasion noted   - (In Office Administered) Tdap Vaccine    5. Aortic atherosclerosis  X ray lumbar spine > Aortic calcifications   On crestor and asa     6. Class 2 severe obesity due to excess calories with serious comorbidity and body mass index (BMI) of 36.0 to 36.9 in adult  Noted BMI 40.66  Rec diet and exercise     7. Mixed hyperlipidemia  Well controlled on lipiotr    8. Primary hypertension  Well controlled follows with Dr Garcia on diovan/aldactone/;asix     9. MARYLU on CPAP  cpap    10. Pacemaker  Due to MARYLU causing bradycardia 16bpm at night     11. Polyneuropathy  ?due to back/chemo? Follows with neuro on gabapentin with relief     12. Prostate cancer  Follows with radiation and [facility]antology oncology at [facility]. On zytiga and s/p radiation beads   On flomax and proscar   Now on prolia for osteopenia as well     13. Sacroiliitis  ?causing neuropathy  On gabaopentin per neuro       Provided Alejo with a 5-10 year written screening schedule and personal prevention plan. Recommendations were developed using the USPSTF age appropriate  recommendations. Education, counseling, and referrals were provided as needed. After Visit Summary printed and given to patient which includes a list of additional screenings\tests needed.    No follow-ups on file.    Lily Mejia NP      I offered to discuss advanced care planning, including how to pick a person who would make decisions for you if you were unable to make them for yourself, called a health care power of , and what kind of decisions you might make such as use of life sustaining treatments such as ventilators and tube feeding when faced with a life limiting illness recorded on a living will that they will need to know. (How you want to be cared for as you near the end of your natural life)     X  Patient has advanced directives written and agrees to provide copies to the institution.

## 2024-03-26 NOTE — PATIENT INSTRUCTIONS
Counseling and Referral of Other Preventative  (Italic type indicates deductible and co-insurance are waived)    Patient Name: Alejo Melvin  Today's Date: 3/26/2024    Health Maintenance       Date Due Completion Date    RSV Vaccine (Age 60+ and Pregnant patients) (1 - 1-dose 60+ series) Never done ---    COVID-19 Vaccine (6 - 2023-24 season) 09/01/2023 10/10/2022    TETANUS VACCINE 11/11/2024 11/11/2014    Lipid Panel 12/12/2024 12/12/2023        No orders of the defined types were placed in this encounter.      The following information is provided to all patients.  This information is to help you find resources for any of the problems found today that may be affecting your health:                  Living healthy guide: www.[facility].[place].gov      Understanding Diabetes: www.diabetes.org      Eating healthy: www.cdc.gov/healthyweight      [facility] home safety checklist: www.cdc.gov/steadi/patient.html      Agency on Aging: www.goea.[place].gov      Alcoholics anonymous (AA): www.aa.org      Physical Activity: www.ar.nih.gov/vl6xmgb      Tobacco use: www.quitwithusla.org

## 2024-03-27 ENCOUNTER — CLINICAL SUPPORT (OUTPATIENT)
Dept: REHABILITATION | Facility: HOSPITAL | Age: 79
End: 2024-03-27
Payer: MEDICARE

## 2024-03-27 DIAGNOSIS — M46.1 SACROILIITIS: Primary | ICD-10-CM

## 2024-03-27 DIAGNOSIS — M54.41 ACUTE BILATERAL LOW BACK PAIN WITH RIGHT-SIDED SCIATICA: ICD-10-CM

## 2024-03-27 PROCEDURE — 97110 THERAPEUTIC EXERCISES: CPT | Mod: PN

## 2024-03-27 PROCEDURE — 97530 THERAPEUTIC ACTIVITIES: CPT | Mod: PN

## 2024-03-27 NOTE — PROGRESS NOTES
"[facility]   Physical Therapy Treatment Note      Name: Alejo Wilder [facility]  Clinic Number: [id number]    Therapy Diagnosis:   Encounter Diagnoses   Name Primary?    Sacroiliitis Yes    Acute bilateral low back pain with right-sided sciatica          Physician: Leroy Alston MD    Visit Date: 3/27/2024    Physician Orders: PT Eval and Treat   Medical Diagnosis from Referral: M46.1 (ICD-10-CM) - Sacroiliitis M54.41 (ICD-10-CM) - Acute bilateral low back pain with right-sided sciatica  Evaluation Date: 2/8/2024  Authorization Period Expiration: 1/25/2025  Plan of Care Expiration: 3/7/2024  Progress Note Due: 2/22/2024  Visit # / Visits authorized: 7/8  FOTO: 1/3     Precautions: Standard and pacemaker      Time In: 0730  Time Out: 0810  Total Appointment Time (timed & untimed codes): 40 min    PTA Visit #: -/5       Subjective     Patient reports: pain is now only when standing or anything vertical. He states the needling has helped him the most.   He was compliant with home exercise program.  Response to previous treatment: positive  Functional change: minimal    Pain: 1/10  Location: left back, L hip    Objective      Objective Measures updated at progress report unless specified.     Treatment     Alejo received the treatments listed below:      therapeutic exercises to develop strength, ROM, flexibility, posture, and core stabilization for 0 minutes including:  3 x 20 seconds B piriformis stretch  1 x 10 TA activation with diaphragmatic breathing  2 x 10 BKTC with SB  2 x 10 lower trunk rotations with SB  2 x 10 green theraband hip abduction  2 x 10 ball squeeze with bridge  10 x 5" hold seated forward green pball rolls   5 x 5" hold seated lateral green pball rolls  5' recumbent bike Level 3    manual therapy techniques: Joint mobilizations were applied to the: lumbar spine for 10 minutes, including:  Right glute med 2- FDN with 75mm , right PSIS, right S1 with 2- 75 " mm    neuromuscular re-education activities to improve: for  minutes. The following activities were included:    therapeutic activities to improve functional performance for 8   minutes, including:  Positional changes    direct contact modalities after being cleared for contraindications:     supervised modalities after being cleared for contradictions:     hot pack for 5 minutes to lumbar spine.    cold pack for  minutes to .    Patient Education and Home Exercises       Education provided:   - return to aquatic fitness training    Written Home Exercises Provided: yes. Exercises were reviewed and Alejo was able to demonstrate them prior to the end of the session.  Alejo demonstrated good  understanding of the education provided. See Electronic Medical Record under Patient Instructions for exercises provided during therapy sessions    Assessment   Patient tolerated today's treatment well with mild reports of complaints with changing positions.     Alejo Is progressing well towards his goals.   Patient prognosis is Good.     Patient will continue to benefit from skilled outpatient physical therapy to address the deficits listed in the problem list box on initial evaluation, provide pt/family education and to maximize pt's level of independence in the home and community environment.     Patient's spiritual, cultural and educational needs considered and pt agreeable to plan of care and goals.     Anticipated barriers to physical therapy: none    Goals: Short Term Goals (2 Weeks):  1. Patient will be compliant with home exercise program to supplement therapy in promoting functional mobility. MET  2. Patient will perform sit to stand with good control to demonstrate improved core strength. MET  3. Patient will report no pain during thoracolumbar active range of motion to promote functional mobility. MET  Long Term Goals (4 Weeks):   1. Patient will improve FOTO score to </= 49% limited to decrease perceived limitation  with maintaining/changing body position.   2. Patient will perform changes in position with good control to demonstrate improved core strength.  3. Patient will improve core strength and postural stability for overall decreased pain with community ambulation.        PLAN   Plan of care Certification: 2/8/2024 to 3/7/2024.     Outpatient Physical Therapy 2 times weekly for 4 weeks to include the following interventions: Manual Therapy, Neuromuscular Re-ed, Patient Education, Therapeutic Activities, and Therapeutic Exercise.      Jojo Longoria, PT     Jojo Longoria, PT

## 2024-04-01 ENCOUNTER — CLINICAL SUPPORT (OUTPATIENT)
Dept: REHABILITATION | Facility: HOSPITAL | Age: 79
End: 2024-04-01
Payer: MEDICARE

## 2024-04-01 DIAGNOSIS — M54.41 ACUTE BILATERAL LOW BACK PAIN WITH RIGHT-SIDED SCIATICA: Chronic | ICD-10-CM

## 2024-04-01 DIAGNOSIS — M46.1 SACROILIITIS: Primary | Chronic | ICD-10-CM

## 2024-04-01 PROCEDURE — 97110 THERAPEUTIC EXERCISES: CPT | Mod: PN

## 2024-04-01 NOTE — PROGRESS NOTES
"[facility]   Physical Therapy Treatment Note      Name: Alejo Wilder [facility]  Clinic Number: [id number]    Therapy Diagnosis:   Encounter Diagnoses   Name Primary?    Sacroiliitis Yes    Acute bilateral low back pain with right-sided sciatica          Physician: Leroy Alston MD    Visit Date: 4/1/2024    Physician Orders: PT Eval and Treat   Medical Diagnosis from Referral: M46.1 (ICD-10-CM) - Sacroiliitis M54.41 (ICD-10-CM) - Acute bilateral low back pain with right-sided sciatica  Evaluation Date: 2/8/2024  Authorization Period Expiration: 1/25/2025  Plan of Care Expiration: 3/7/2024  Progress Note Due: 2/22/2024  Visit # / Visits authorized: 8/8  FOTO: 1/3     Precautions: Standard and pacemaker      Time In: 1015  Time Out: 1055  Total Appointment Time (timed & untimed codes): 40 min    PTA Visit #: -/5       Subjective     Patient reports: he does get pain relief from dry needling but does not last.  He was compliant with home exercise program.  Response to previous treatment: positive  Functional change: minimal    Pain: 1/10  Location: left back, L hip    Objective      Objective Measures updated at progress report unless specified.     Treatment     Alejo received the treatments listed below:      therapeutic exercises to develop strength, ROM, flexibility, posture, and core stabilization for 0 minutes including:  3 x 20 seconds B piriformis stretch  1 x 10 TA activation with diaphragmatic breathing  2 x 10 BKTC with SB  2 x 10 lower trunk rotations with SB  2 x 10 green theraband hip abduction  2 x 10 ball squeeze with bridge  10 x 5" hold seated forward green pball rolls   5 x 5" hold seated lateral green pball rolls  5' recumbent bike Level 3    manual therapy techniques: Joint mobilizations were applied to the: lumbar spine for 10 minutes, including:  Right glute med 2- FDN with 75mm , right PSIS, right S1 with 2- 75 mm    neuromuscular re-education activities to " improve: for  minutes. The following activities were included:    therapeutic activities to improve functional performance for 8   minutes, including:  Positional changes    direct contact modalities after being cleared for contraindications:     supervised modalities after being cleared for contradictions:     hot pack for 5 minutes to lumbar spine.    cold pack for  minutes to .    Patient Education and Home Exercises       Education provided:   - return to aquatic fitness training    Written Home Exercises Provided: yes. Exercises were reviewed and Alejo was able to demonstrate them prior to the end of the session.  Alejo demonstrated good  understanding of the education provided. See Electronic Medical Record under Patient Instructions for exercises provided during therapy sessions    Assessment   Patient is discharged at this time. Patient I with home exercise program and would like to possibly try JON at this point.    Alejo Is progressing well towards his goals.   Patient prognosis is Good.     Patient will continue to benefit from skilled outpatient physical therapy to address the deficits listed in the problem list box on initial evaluation, provide pt/family education and to maximize pt's level of independence in the home and community environment.     Patient's spiritual, cultural and educational needs considered and pt agreeable to plan of care and goals.     Anticipated barriers to physical therapy: none    Goals: Short Term Goals (2 Weeks):  1. Patient will be compliant with home exercise program to supplement therapy in promoting functional mobility. MET  2. Patient will perform sit to stand with good control to demonstrate improved core strength. MET  3. Patient will report no pain during thoracolumbar active range of motion to promote functional mobility. MET  Long Term Goals (4 Weeks):   1. Patient will improve FOTO score to </= 49% limited to decrease perceived limitation with maintaining/changing  body position. MET  2. Patient will perform changes in position with good control to demonstrate improved core strength. MET  3. Patient will improve core strength and postural stability for overall decreased pain with community ambulation.        PLAN   Patient is discharged at this time.  Plan of care Certification: 2/8/2024 to 3/7/2024.     Outpatient Physical Therapy 2 times weekly for 4 weeks to include the following interventions: Manual Therapy, Neuromuscular Re-ed, Patient Education, Therapeutic Activities, and Therapeutic Exercise.      Jojo Longoria, PT     Jojo Longoria, PT

## 2024-04-03 ENCOUNTER — OFFICE VISIT (OUTPATIENT)
Dept: INTERNAL MEDICINE | Facility: CLINIC | Age: 79
End: 2024-04-03
Payer: MEDICARE

## 2024-04-03 VITALS — BODY MASS INDEX: 40.13 KG/M2 | HEIGHT: 72 IN | WEIGHT: 296.31 LBS | HEART RATE: 92 BPM | RESPIRATION RATE: 20 BRPM

## 2024-04-03 DIAGNOSIS — M46.1 SACROILIITIS: ICD-10-CM

## 2024-04-03 DIAGNOSIS — E78.2 MIXED HYPERLIPIDEMIA: ICD-10-CM

## 2024-04-03 DIAGNOSIS — M54.41 ACUTE BILATERAL LOW BACK PAIN WITH RIGHT-SIDED SCIATICA: ICD-10-CM

## 2024-04-03 DIAGNOSIS — M54.16 LUMBAR RADICULOPATHY, CHRONIC: Primary | ICD-10-CM

## 2024-04-03 DIAGNOSIS — I10 PRIMARY HYPERTENSION: ICD-10-CM

## 2024-04-03 PROCEDURE — 1100F PTFALLS ASSESS-DOCD GE2>/YR: CPT | Mod: CPTII,S$GLB,, | Performed by: FAMILY MEDICINE

## 2024-04-03 PROCEDURE — 1159F MED LIST DOCD IN RCRD: CPT | Mod: CPTII,S$GLB,, | Performed by: FAMILY MEDICINE

## 2024-04-03 PROCEDURE — 3288F FALL RISK ASSESSMENT DOCD: CPT | Mod: CPTII,S$GLB,, | Performed by: FAMILY MEDICINE

## 2024-04-03 PROCEDURE — 99214 OFFICE O/P EST MOD 30 MIN: CPT | Mod: S$GLB,,, | Performed by: FAMILY MEDICINE

## 2024-04-03 PROCEDURE — 1160F RVW MEDS BY RX/DR IN RCRD: CPT | Mod: CPTII,S$GLB,, | Performed by: FAMILY MEDICINE

## 2024-04-03 PROCEDURE — 99999 PR PBB SHADOW E&M-EST. PATIENT-LVL V: CPT | Mod: PBBFAC,,, | Performed by: FAMILY MEDICINE

## 2024-04-03 PROCEDURE — 1125F AMNT PAIN NOTED PAIN PRSNT: CPT | Mod: CPTII,S$GLB,, | Performed by: FAMILY MEDICINE

## 2024-04-03 RX ORDER — CELECOXIB 200 MG/1
CAPSULE ORAL 2 TIMES DAILY
COMMUNITY
Start: 2024-03-26 | End: 2024-05-10 | Stop reason: ALTCHOICE

## 2024-04-03 NOTE — PROGRESS NOTES
Subjective:       Patient ID: Alejo Melvin is a 78 y.o. male.    Chief Complaint: Follow-up (6 mo)    Patient was seen 2 months ago for right hip and right sacroiliac pain.  I gave him a steroid shot which helped for few days.  He is now on Celebrex but the pain persists.  He has a history of prostate cancer.  Bone scan consistant with arthritis.  No sign of metastatic disease He is still having pain   He seems worse than the last time I saw him.  He still has pain radiating into his left buttock.  This is worse with movement.  He is doing less and less at home because movement is so painful.  Patient completed 5 weeks of physical therapy he has not much better.    Hip Pain   The incident occurred more than 1 week ago. The incident occurred in the yard. The injury mechanism was a twisting injury. The pain is present in the left hip. The pain is at a severity of 4/10. The pain is moderate. The pain has been Fluctuating since onset. Associated symptoms include an inability to bear weight. Pertinent negatives include no numbness. The symptoms are aggravated by weight bearing and movement. He has tried acetaminophen for the symptoms. The treatment provided mild relief.   Follow-up  Associated symptoms include arthralgias. Pertinent negatives include no abdominal pain, chest pain, coughing, joint swelling, numbness or rash.     Review of Systems   Constitutional:  Negative for unexpected weight change.   Respiratory:  Negative for cough, chest tightness and shortness of breath.    Cardiovascular:  Negative for chest pain and leg swelling.   Gastrointestinal:  Negative for abdominal pain.   Genitourinary:  Negative for difficulty urinating.   Musculoskeletal:  Positive for arthralgias and back pain. Negative for joint swelling.   Skin:  Negative for rash.   Neurological:  Negative for numbness.   Hematological:  Negative for adenopathy.       Objective:      Vitals:    04/03/24 1353   BP: (P) 124/84   Pulse: 92    Resp: 20     Physical Exam  Constitutional:       Appearance: Normal appearance. He is well-developed. He is obese.   Cardiovascular:      Rate and Rhythm: Normal rate and regular rhythm.      Heart sounds: Normal heart sounds. No murmur heard.     No gallop.   Pulmonary:      Effort: Pulmonary effort is normal.      Breath sounds: Normal breath sounds.   Musculoskeletal:         General: Tenderness present.      Thoracic back: No scoliosis.      Lumbar back: Tenderness and bony tenderness present. Normal range of motion. Negative right straight leg raise test and negative left straight leg raise test. No scoliosis.        Back:       Right hip: No tenderness. Normal range of motion.      Left hip: No tenderness. Normal range of motion.      Right lower leg: No edema.      Left lower leg: No edema.        Legs:       Comments: Negative straight leg raise   Neurological:      General: No focal deficit present.      Mental Status: He is alert and oriented to person, place, and time.      Deep Tendon Reflexes: Reflexes are normal and symmetric.       Assessment:       1. Lumbar radiculopathy, chronic    2. Mixed hyperlipidemia    3. Primary hypertension    4. Sacroiliitis    5. Acute bilateral low back pain with right-sided sciatica              Plan:   1. Lumbar radiculopathy, chronic  -     CT Lumbar Spine Without Contrast; Future; Expected date: 04/03/2024  -     Ambulatory referral/consult to Pain Clinic; Future; Expected date: 04/10/2024  -     Ambulatory referral/consult to Neurosurgery; Future; Expected date: 04/10/2024    2. Mixed hyperlipidemia  Overview:  Low fat diet.  Avoid sweets.  A 10 pound weight loss by the next visit as a  good goal.  Increase consumption of fruits and vegetables, fish and chicken.  Use medications below:        fish oil-omega-3 fatty acids 300-1,000 mg capsule; Take 2 capsules (2 g total) by mouth once daily.  - Rosuvastatin 40 mg by mouth daily  -     Zetia 10 mg daily        3.  Primary hypertension  Overview:  Two gram sodium diet.    Weight loss discussed.    Try to walk 2 miles per day.    Avoid smoking.    Current medications will be:  - Stop amlodipine 5 mg po daily.  Restart if S BP > 140.        Valsartan  40 mg at night  - aspirin (ECOTRIN) 81 MG EC tablet; Take 1 tablet (81 mg total) by mouth 2 (two) times daily.  -     Lasix 40 mg 1 tabs daily  - continue spironolactone 25 mg daily  Hold meds if BP less than 120        4. Sacroiliitis  Overview:  Continue physical therapy   Continue Celebrex   Re-evaluate in 3 weeks.  He may need CT imaging, pain management.    Orders:  -     CT Lumbar Spine Without Contrast; Future; Expected date: 04/03/2024  -     Ambulatory referral/consult to Pain Clinic; Future; Expected date: 04/10/2024  -     Ambulatory referral/consult to Neurosurgery; Future; Expected date: 04/10/2024    5. Acute bilateral low back pain with right-sided sciatica  Overview:  Continue physical therapy   Continue Celebrex   RTC as needed    Orders:  -     CT Lumbar Spine Without Contrast; Future; Expected date: 04/03/2024  -     Ambulatory referral/consult to Pain Clinic; Future; Expected date: 04/10/2024  -     Ambulatory referral/consult to Neurosurgery; Future; Expected date: 04/10/2024      RTC in 3 months

## 2024-04-09 ENCOUNTER — HOSPITAL ENCOUNTER (OUTPATIENT)
Dept: RADIOLOGY | Facility: HOSPITAL | Age: 79
Discharge: HOME | End: 2024-04-09
Attending: FAMILY MEDICINE
Payer: MEDICARE

## 2024-04-09 DIAGNOSIS — M54.16 LUMBAR RADICULOPATHY, CHRONIC: ICD-10-CM

## 2024-04-09 DIAGNOSIS — M54.41 ACUTE BILATERAL LOW BACK PAIN WITH RIGHT-SIDED SCIATICA: ICD-10-CM

## 2024-04-09 DIAGNOSIS — M46.1 SACROILIITIS: ICD-10-CM

## 2024-04-09 PROCEDURE — 72131 CT LUMBAR SPINE W/O DYE: CPT | Mod: TC

## 2024-04-09 PROCEDURE — 72131 CT LUMBAR SPINE W/O DYE: CPT | Mod: 26,,, | Performed by: RADIOLOGY

## 2024-04-10 ENCOUNTER — PATIENT MESSAGE (OUTPATIENT)
Dept: HEPATOLOGY | Facility: HOSPITAL | Age: 79
End: 2024-04-10
Payer: MEDICARE

## 2024-04-10 ENCOUNTER — TELEPHONE (OUTPATIENT)
Dept: FAMILY MEDICINE | Facility: CLINIC | Age: 79
End: 2024-04-10

## 2024-04-10 DIAGNOSIS — M84.48XA PATHOLOGICAL FRACTURE OF SACRAL VERTEBRA: ICD-10-CM

## 2024-04-10 DIAGNOSIS — M48.07 SPINAL STENOSIS OF LUMBOSACRAL REGION: ICD-10-CM

## 2024-04-10 DIAGNOSIS — M54.9 DORSALGIA, UNSPECIFIED: Primary | ICD-10-CM

## 2024-04-10 NOTE — TELEPHONE ENCOUNTER
Diagnoses and all orders for this visit:    Dorsalgia, unspecified  -     MRI Lumbar Spine Without Contrast; Future    Spinal stenosis of lumbosacral region  -     MRI Lumbar Spine Without Contrast; Future    Pathological fracture of sacral vertebra  -     MRI Lumbar Spine Without Contrast; Future      [facility]

## 2024-04-10 NOTE — TELEPHONE ENCOUNTER
----- Message from Luna Sparks sent at 4/10/2024  2:51 PM CDT -----  Contact: pt  Alejo Melvin  MRN: [id number]  : 1945  PCP: Leroy Alston.  Home Phone      [phone]  Work Phone      Not on file.  Mobile          [phone]      MESSAGE:     Pt states he has been having back pain and is wondering if    would recommend a chiropractor or if  thinks that will be a bad idea. Pt has a Cat scan done yesterday.         Please advise   [phone]

## 2024-04-11 ENCOUNTER — PATIENT MESSAGE (OUTPATIENT)
Dept: FAMILY MEDICINE | Facility: CLINIC | Age: 79
End: 2024-04-11
Payer: MEDICARE

## 2024-04-17 ENCOUNTER — TELEPHONE (OUTPATIENT)
Dept: INTERNAL MEDICINE | Facility: CLINIC | Age: 79
End: 2024-04-17
Payer: MEDICARE

## 2024-04-17 NOTE — TELEPHONE ENCOUNTER
LOV 4/3/24, he was suppose to have a MRI , but it was cancelled due to :    (Patient's [employer] pacemakers generator is not compatible with MRI     PLEASE ADVISE

## 2024-04-17 NOTE — TELEPHONE ENCOUNTER
----- Message from Xander Davenport sent at 2024  4:00 PM CDT -----  Contact: pt  Alejo Melvin  MRN: [id number]  : 1945  PCP: Leroy Alston  Home Phone      [phone]  Work Phone      Not on file.  Mobile          [phone]      MESSAGE:     Pt called wanting to speak with nurse about MRI he was supposed to have this Thursday. Pt stated [facility] won't be able to do the MRI.          Please advise  [phone]

## 2024-04-24 ENCOUNTER — HOSPITAL ENCOUNTER (OUTPATIENT)
Dept: RADIOLOGY | Facility: HOSPITAL | Age: 79
Discharge: HOME | End: 2024-04-24
Attending: NEUROLOGICAL SURGERY
Payer: MEDICARE

## 2024-04-24 ENCOUNTER — TELEPHONE (OUTPATIENT)
Dept: NEUROSURGERY | Facility: CLINIC | Age: 79
End: 2024-04-24
Payer: MEDICARE

## 2024-04-24 ENCOUNTER — OFFICE VISIT (OUTPATIENT)
Dept: NEUROSURGERY | Facility: CLINIC | Age: 79
End: 2024-04-24
Payer: MEDICARE

## 2024-04-24 VITALS
HEIGHT: 72 IN | DIASTOLIC BLOOD PRESSURE: 80 MMHG | BODY MASS INDEX: 40.13 KG/M2 | HEART RATE: 83 BPM | SYSTOLIC BLOOD PRESSURE: 130 MMHG | WEIGHT: 296.31 LBS

## 2024-04-24 DIAGNOSIS — M48.02 SPINAL STENOSIS OF CERVICAL REGION WITH RADICULOPATHY: ICD-10-CM

## 2024-04-24 DIAGNOSIS — M54.9 BACK PAIN, UNSPECIFIED BACK LOCATION, UNSPECIFIED BACK PAIN LATERALITY, UNSPECIFIED CHRONICITY: Primary | ICD-10-CM

## 2024-04-24 DIAGNOSIS — M48.062 SPINAL STENOSIS OF LUMBAR REGION WITH NEUROGENIC CLAUDICATION: Primary | ICD-10-CM

## 2024-04-24 DIAGNOSIS — M54.12 SPINAL STENOSIS OF CERVICAL REGION WITH RADICULOPATHY: ICD-10-CM

## 2024-04-24 DIAGNOSIS — M54.41 ACUTE BILATERAL LOW BACK PAIN WITH RIGHT-SIDED SCIATICA: ICD-10-CM

## 2024-04-24 DIAGNOSIS — M48.07 FORAMINAL STENOSIS OF LUMBOSACRAL REGION: ICD-10-CM

## 2024-04-24 DIAGNOSIS — M54.9 BACK PAIN, UNSPECIFIED BACK LOCATION, UNSPECIFIED BACK PAIN LATERALITY, UNSPECIFIED CHRONICITY: ICD-10-CM

## 2024-04-24 DIAGNOSIS — M54.16 LUMBAR RADICULOPATHY, CHRONIC: ICD-10-CM

## 2024-04-24 PROCEDURE — 1159F MED LIST DOCD IN RCRD: CPT | Mod: CPTII,S$GLB,, | Performed by: NEUROLOGICAL SURGERY

## 2024-04-24 PROCEDURE — 3079F DIAST BP 80-89 MM HG: CPT | Mod: CPTII,S$GLB,, | Performed by: NEUROLOGICAL SURGERY

## 2024-04-24 PROCEDURE — 1100F PTFALLS ASSESS-DOCD GE2>/YR: CPT | Mod: CPTII,S$GLB,, | Performed by: NEUROLOGICAL SURGERY

## 2024-04-24 PROCEDURE — 3075F SYST BP GE 130 - 139MM HG: CPT | Mod: CPTII,S$GLB,, | Performed by: NEUROLOGICAL SURGERY

## 2024-04-24 PROCEDURE — 72082 X-RAY EXAM ENTIRE SPI 2/3 VW: CPT | Mod: TC,FY

## 2024-04-24 PROCEDURE — 1125F AMNT PAIN NOTED PAIN PRSNT: CPT | Mod: CPTII,S$GLB,, | Performed by: NEUROLOGICAL SURGERY

## 2024-04-24 PROCEDURE — 99999 PR PBB SHADOW E&M-EST. PATIENT-LVL IV: CPT | Mod: PBBFAC,,, | Performed by: NEUROLOGICAL SURGERY

## 2024-04-24 PROCEDURE — 99205 OFFICE O/P NEW HI 60 MIN: CPT | Mod: S$GLB,,, | Performed by: NEUROLOGICAL SURGERY

## 2024-04-24 PROCEDURE — 72082 X-RAY EXAM ENTIRE SPI 2/3 VW: CPT | Mod: 26,,, | Performed by: INTERNAL MEDICINE

## 2024-04-24 PROCEDURE — 3288F FALL RISK ASSESSMENT DOCD: CPT | Mod: CPTII,S$GLB,, | Performed by: NEUROLOGICAL SURGERY

## 2024-04-24 RX ORDER — GABAPENTIN 600 MG/1
600 TABLET ORAL 3 TIMES DAILY
Qty: 90 TABLET | Refills: 11 | Status: SHIPPED | OUTPATIENT
Start: 2024-04-24 | End: 2025-04-24

## 2024-04-24 NOTE — PROGRESS NOTES
NEUROSURGICAL CONSULTATION NOTE    DATE OF SERVICE:  04/24/2024    ATTENDING PHYSICIAN:  Max Avendano MD    SUBJECTIVE:    INTERIM HISTORY:    This is a very pleasant 78 y.o. male, complaining with worsening bilateral buttock pain radiating down his legs.  The pain travels below the knees.  Pain is interfering with his ability to walk or stand for long period of time.  Patient has developed an antalgic gait.  Pain is affecting his quality of life and functional status.  Pain is partially relieved by sitting down.  He has more right-sided pain than left-sided pain.  No new onset of motor weakness numbness or sphincter dysfunction symptoms.  He is completed physical therapy.  He has a Jehovah Witness.  He is taking gabapentin 300 mg 3 times daily.  He is also being treated for prostate cancer.  He has a pacemaker and can not get an MRI.  He is on aspirin 81 mg daily.              PAST MEDICAL HISTORY:  Active Ambulatory Problems     Diagnosis Date Noted    Hypertension     Hyperlipidemia     AR (allergic rhinitis) 10/04/2013    Colon cancer screening 02/25/2014    Polyp of ascending colon 02/25/2014    Diverticulosis 02/25/2014    Primary osteoarthritis of right knee 04/01/2016    Nasal polyp 04/01/2016    Other seasonal allergic rhinitis 10/04/2016    MARYLU on CPAP 10/04/2016    Polyneuropathy 05/01/2019    Pacemaker 05/01/2019    Class 2 severe obesity due to excess calories with serious comorbidity and body mass index (BMI) of 36.0 to 36.9 in adult 11/01/2021    Prostate cancer 08/22/2022    Hypotension due to drugs 12/04/2023    Aortic atherosclerosis 02/02/2024    Left hip pain 02/02/2024    Acute bilateral low back pain with right-sided sciatica 02/02/2024    Sacroiliitis 02/02/2024    Other specified degenerative diseases of nervous system 02/23/2024     Resolved Ambulatory Problems     Diagnosis Date Noted    Obstructive sleep apnea     Bradycardia 11/25/2014    Bilateral carpal tunnel syndrome 05/01/2019     Elevated PSA, less than 10 ng/ml 03/20/2020     Past Medical History:   Diagnosis Date    Arthritis     Back pain     Basal cell carcinoma (BCC) in situ of skin     Cancer     Obesity     Osteoporosis     Sleep apnea     Urinary incontinence        PAST SURGICAL HISTORY:  Past Surgical History:   Procedure Laterality Date    CARDIAC PACEMAKER PLACEMENT  02/01/2015    COLONOSCOPY      COLONOSCOPY N/A 3/27/2023    Procedure: COLONOSCOPY;  Surgeon: Leroy Alston MD;  Location: [facility];  Service: Endoscopy;  Laterality: N/A;    Lt knee      left knee torn skin and damaged cartilage    PROSTATE BIOPSY      rt heel      Achilles tendon repair    TOTAL KNEE ARTHROPLASTY Left 11/11/2021    Procedure: ARTHROPLASTY, KNEE, TOTAL-LIANNE;  Surgeon: Brian Hunt MD;  Location: [facility];  Service: Orthopedics;  Laterality: Left;       SOCIAL HISTORY:   Social History     Socioeconomic History    Marital status:      Spouse name: Yessenai Cote    Number of children: 5   Occupational History     Comment: [employer] retired-  age 62   Tobacco Use    Smoking status: Never     Passive exposure: Never    Smokeless tobacco: Never   Substance and Sexual Activity    Alcohol use: Not Currently    Drug use: Never    Sexual activity: Not Currently     Partners: Female   Social History Narrative    Lives with a house no stairs     Social Determinants of Health     Financial Resource Strain: Low Risk  (3/26/2024)    Overall Financial Resource Strain (CARDIA)     Difficulty of Paying Living Expenses: Not very hard   Food Insecurity: No Food Insecurity (3/26/2024)    Hunger Vital Sign     Worried About Running Out of Food in the Last Year: Never true     Ran Out of Food in the Last Year: Never true   Transportation Needs: No Transportation Needs (3/26/2024)    PRAPARE - Transportation     Lack of Transportation (Medical): No     Lack of Transportation (Non-Medical): No   Physical Activity: Inactive (3/26/2024)    Exercise  Vital Sign     Days of Exercise per Week: 0 days     Minutes of Exercise per Session: 0 min   Stress: No Stress Concern Present (3/26/2024)    [employer] [place] [employer] - Occupational Stress Questionnaire     Feeling of Stress : Not at all   Social Connections: Socially Integrated (3/26/2024)    Social Connection and Isolation Panel [NHANES]     Frequency of Communication with Friends and Family: Three times a week     Frequency of Social Gatherings with Friends and Family: Once a week     Attends Jewish Services: More than 4 times per year     Active Member of Clubs or Organizations: Yes     Attends Club or Organization Meetings: More than 4 times per year     Marital Status:    Housing Stability: Low Risk  (3/26/2024)    Housing Stability Vital Sign     Unable to Pay for Housing in the Last Year: No     Number of Places Lived in the Last Year: 1     Unstable Housing in the Last Year: No       FAMILY HISTORY:  Family History   Problem Relation Name Age of Onset    Cancer Father         CURRENTS MEDICATIONS:  Current Outpatient Medications on File Prior to Visit   Medication Sig Dispense Refill    abiraterone (ZYTIGA) 250 mg Tab Take 1,000 mg by mouth nightly.      ascorbic acid, vitamin C, (VITAMIN C) 1000 MG tablet Take 2 tablets orally once in the morning.      aspirin (ECOTRIN) 81 MG EC tablet Take 1 tablet (81 mg total) by mouth 2 (two) times daily. 60 tablet 0    b complex vitamins capsule Take 1 capsule by mouth once daily.      celecoxib (CELEBREX) 200 MG capsule TAKE ONE CAPSULE BY MOUTH DAILY WITH FOOD      cholecalciferol, vitamin D3, (VITAMIN D3) 50 mcg (2,000 unit) Cap Take 1 capsule by mouth once daily.      denosumab (PROLIA) 60 mg/mL Syrg       finasteride (PROSCAR) 5 mg tablet Take 5 mg by mouth once daily.      fluticasone propionate (FLONASE) 50 mcg/actuation nasal spray 2 sprays (100 mcg total) by Each Nostril route daily as needed for Rhinitis. 16 g 5    furosemide  (LASIX) 40 MG tablet Take 40 mg by mouth every other day.      gabapentin (NEURONTIN) 300 MG capsule Take 300 mg by mouth 3 (three) times daily.      leuprolide acetate, 6 month, (ELIGARD) 45 mg injection Inject 45 mg into the skin every 6 (six) months.      montelukast (SINGULAIR) 10 mg tablet TAKE ONE TABLET BY MOUTH EVERY EVENING 90 tablet 3    predniSONE (DELTASONE) 5 MG tablet Take 5 mg by mouth 2 (two) times daily.      rosuvastatin (CRESTOR) 20 MG tablet Take 20 mg by mouth.      spironolactone (ALDACTONE) 25 MG tablet Take 25 mg by mouth once daily.      tamsulosin (FLOMAX) 0.4 mg Cap Take 1 capsule by mouth every other day.      valsartan (DIOVAN) 40 MG tablet Take 1 tablet (40 mg total) by mouth every evening.      venlafaxine (EFFEXOR) 75 MG tablet Take 75 mg by mouth.      zinc 50 mg Tab        Current Facility-Administered Medications on File Prior to Visit   Medication Dose Route Frequency Provider Last Rate Last Admin    fentaNYL 50 mcg/mL injection  mcg   mcg Intravenous PRN Garry Mcrae MD        midazolam (VERSED) 1 mg/mL injection 0.5-4 mg  0.5-4 mg Intravenous PRN Garry Mcrae MD           ALLERGIES:  Review of patient's allergies indicates:  No Known Allergies    REVIEW OF SYSTEMS:  Review of Systems   Constitutional:  Negative for diaphoresis, fever and weight loss.   Respiratory:  Negative for shortness of breath.    Cardiovascular:  Negative for chest pain.   Gastrointestinal:  Negative for blood in stool.   Genitourinary:  Negative for hematuria.   Endo/Heme/Allergies:  Does not bruise/bleed easily.   All other systems reviewed and are negative.      OBJECTIVE:    PHYSICAL EXAMINATION:   Vitals:    04/24/24 1616   BP: 130/80   Pulse: 83       Physical Exam:  Vitals reviewed.    Constitutional: He appears well-developed and well-nourished.     Eyes: Pupils are equal, round, and reactive to light. Conjunctivae and EOM are normal.     Cardiovascular: Normal distal  pulses and no edema.     Abdominal: Soft.     Skin: Skin displays no rash on trunk and no rash on extremities. Skin displays no lesions on trunk and no lesions on extremities.     Psych/Behavior: He is alert. He is oriented to person, place, and time. He has a normal mood and affect.     Musculoskeletal:        Neck: Range of motion is full.     Neurological:        DTRs: Tricep reflexes are 0 on the right side and 0 on the left side. Bicep reflexes are 0 on the right side and 0 on the left side. Brachioradialis reflexes are 0 on the right side and 0 on the left side. Patellar reflexes are 0 on the right side and 0 on the left side. Achilles reflexes are 0 on the right side and 0 on the left side.     Back Exam     Muscle Strength   Right Quadriceps:  5/5   Left Quadriceps:  5/5   Right Hamstrings:  5/5   Left Hamstrings:  5/5           Neurologic Exam     Mental Status   Oriented to person, place, and time.   Speech: speech is normal   Level of consciousness: alert    Cranial Nerves   Cranial nerves II through XII intact.     CN III, IV, VI   Pupils are equal, round, and reactive to light.  Extraocular motions are normal.     Motor Exam   Muscle bulk: normal  Overall muscle tone: normal    Strength   Right deltoid: 5/5  Left deltoid: 5/5  Right biceps: 5/5  Left biceps: 5/5  Right triceps: 5/5  Left triceps: 5/5  Right wrist flexion: 5/5  Left wrist flexion: 5/5  Right wrist extension: 5/5  Left wrist extension: 5/5  Right interossei: 5/5  Left interossei: 5/5  Right iliopsoas: 5/5  Left iliopsoas: 5/5  Right quadriceps: 5/5  Left quadriceps: 5/5  Right hamstrin/5  Left hamstrin/5  Right anterior tibial: 5/5  Left anterior tibial: 5/5  Right posterior tibial: 5/5  Left posterior tibial: 5/5  Right peroneal: 5/5  Left peroneal: 5/5  Right gastroc: 5/5  Left gastroc: 5/5    Sensory Exam   Light touch normal.   Pinprick normal.     Gait, Coordination, and Reflexes     Gait  Gait: (Antalgic)    Reflexes   Right  brachioradialis: 0  Left brachioradialis: 0  Right biceps: 0  Left biceps: 0  Right triceps: 0  Left triceps: 0  Right patellar: 0  Left patellar: 0  Right achilles: 0  Left achilles: 0  Right plantar: normal  Left plantar: normal  Right Garcia: absent  Left Garcia: absent  Right ankle clonus: absent  Left ankle clonus: absent      DIAGNOSTIC DATA:  I personally interpreted the following imaging:   CT of the lumbar spine shows severe L4-5 stenosis, right L5-S1 foraminal stenosis.  Ankylosis at L2-3 and T12-L1    ASSESSMENT:  This is a 78 y.o. male with     Problem List Items Addressed This Visit          Orthopedic    Acute bilateral low back pain with right-sided sciatica    Overview     Continue physical therapy   Continue Celebrex   RTC as needed          Other Visit Diagnoses       Spinal stenosis of lumbar region with neurogenic claudication    -  Primary    Lumbar radiculopathy, chronic        Spinal stenosis of cervical region with radiculopathy        Foraminal stenosis of lumbosacral region                  PLAN:  I explained the natural history of the disease and all treatment options. I recommended a minimally invasive left L4-5, L5-S1 laminectomy, medial facetectomy and contralateral foraminotomy.  The goals of the surgery is to improve his neurogenic claudication symptoms and radiculopathy symptoms by decompressing the severe stenosis at those levels.    We have discussed the risks of surgery including death, coma, bleeding, infection, failure of surgery, CSF leak, nerve root injury, spinal cord injury, ureter injury, weakness, paralysis, peripheral neuropathy, need for reoperation. Patient understands the risks and would like to proceed with surgery.    Patient has a BMI of 40          Max Avendano MD  Cell:[phone]

## 2024-04-25 ENCOUNTER — TELEPHONE (OUTPATIENT)
Dept: NEUROSURGERY | Facility: CLINIC | Age: 79
End: 2024-04-25
Payer: MEDICARE

## 2024-04-25 NOTE — TELEPHONE ENCOUNTER
Called and spoke to pharmacy staff about prescription to fill the 600 mg not the 300 mg. He voiced understanding

## 2024-04-25 NOTE — TELEPHONE ENCOUNTER
----- Message from Vivian Contreras sent at 4/25/2024  9:03 AM CDT -----  Contact: to  Type:  Pharmacy Calling to Clarify an RX    Name of Caller:Buck  Pharmacy Name:[facility] - [place], [place] - [address]   Phone: [phone]  Fax: [phone]        Prescription Name:gabapentin (NEURONTIN) 600 MG tablet  What do they need to clarify?:pt got another prescription few weeks ago for 300 MG, is this replacing that medication ?  Best Call Back Number:[phone]  Additional Information:

## 2024-04-26 ENCOUNTER — OFFICE VISIT (OUTPATIENT)
Dept: INTERNAL MEDICINE | Facility: CLINIC | Age: 79
End: 2024-04-26
Payer: MEDICARE

## 2024-04-26 VITALS — WEIGHT: 295.44 LBS | HEART RATE: 88 BPM | BODY MASS INDEX: 40.02 KG/M2 | HEIGHT: 72 IN | RESPIRATION RATE: 19 BRPM

## 2024-04-26 DIAGNOSIS — D69.2 SENILE PURPURA: Primary | ICD-10-CM

## 2024-04-26 DIAGNOSIS — E78.2 MIXED HYPERLIPIDEMIA: ICD-10-CM

## 2024-04-26 DIAGNOSIS — M54.41 ACUTE BILATERAL LOW BACK PAIN WITH RIGHT-SIDED SCIATICA: ICD-10-CM

## 2024-04-26 DIAGNOSIS — I10 PRIMARY HYPERTENSION: ICD-10-CM

## 2024-04-26 PROCEDURE — 1159F MED LIST DOCD IN RCRD: CPT | Mod: CPTII,S$GLB,, | Performed by: FAMILY MEDICINE

## 2024-04-26 PROCEDURE — 1101F PT FALLS ASSESS-DOCD LE1/YR: CPT | Mod: CPTII,S$GLB,, | Performed by: FAMILY MEDICINE

## 2024-04-26 PROCEDURE — 99214 OFFICE O/P EST MOD 30 MIN: CPT | Mod: S$GLB,,, | Performed by: FAMILY MEDICINE

## 2024-04-26 PROCEDURE — 1160F RVW MEDS BY RX/DR IN RCRD: CPT | Mod: CPTII,S$GLB,, | Performed by: FAMILY MEDICINE

## 2024-04-26 PROCEDURE — 1125F AMNT PAIN NOTED PAIN PRSNT: CPT | Mod: CPTII,S$GLB,, | Performed by: FAMILY MEDICINE

## 2024-04-26 PROCEDURE — 99999 PR PBB SHADOW E&M-EST. PATIENT-LVL IV: CPT | Mod: PBBFAC,,, | Performed by: FAMILY MEDICINE

## 2024-04-26 PROCEDURE — 1157F ADVNC CARE PLAN IN RCRD: CPT | Mod: CPTII,S$GLB,, | Performed by: FAMILY MEDICINE

## 2024-04-26 PROCEDURE — 3288F FALL RISK ASSESSMENT DOCD: CPT | Mod: CPTII,S$GLB,, | Performed by: FAMILY MEDICINE

## 2024-04-26 RX ORDER — MUPIROCIN 20 MG/G
OINTMENT TOPICAL 2 TIMES DAILY
Qty: 22 G | Refills: 3 | Status: SHIPPED | OUTPATIENT
Start: 2024-04-26

## 2024-04-26 NOTE — PROGRESS NOTES
Subjective:       Patient ID: Alejo Melvin is a 78 y.o. male.    Chief Complaint: Follow-up (3 week)    Patient was seen 2 months ago for right hip and right sacroiliac pain.  I gave him a steroid shot which helped for few days.  He is now on Celebrex but the pain persists.  He has a history of prostate cancer.  Bone scan consistant with arthritis.  No sign of metastatic disease He is still having pain   He seems worse than the last time I saw him.  He still has pain radiating into his left buttock.  This is worse with movement.  He is doing less and less at home because movement is so painful.  Patient completed 5 weeks of physical therapy he has not much better.  He saw Dr Avendano.  He is scheduled for surgery for the sciatica.  BP has been more stable.  He is on the digital medicine program.    Hip Pain   The incident occurred more than 1 week ago. The incident occurred in the yard. The injury mechanism was a twisting injury. The pain is present in the left hip. The pain is at a severity of 4/10. The pain is moderate. The pain has been Fluctuating since onset. Associated symptoms include an inability to bear weight. Pertinent negatives include no numbness. The symptoms are aggravated by weight bearing and movement. He has tried acetaminophen for the symptoms. The treatment provided mild relief.   Follow-up  Associated symptoms include arthralgias. Pertinent negatives include no abdominal pain, chest pain, coughing, joint swelling, numbness or rash.     Review of Systems   Constitutional:  Negative for unexpected weight change.   Respiratory:  Negative for cough, chest tightness and shortness of breath.    Cardiovascular:  Negative for chest pain and leg swelling.   Gastrointestinal:  Negative for abdominal pain.   Genitourinary:  Negative for difficulty urinating.   Musculoskeletal:  Positive for arthralgias and back pain. Negative for joint swelling.   Skin:  Negative for rash.   Neurological:  Negative  for numbness.   Hematological:  Negative for adenopathy.       Objective:      Vitals:    04/26/24 0923   BP: (P) 122/82   Pulse: 88   Resp: 19     Physical Exam  Constitutional:       Appearance: Normal appearance. He is well-developed. He is obese.   Cardiovascular:      Rate and Rhythm: Normal rate and regular rhythm.      Heart sounds: Normal heart sounds. No murmur heard.     No gallop.   Pulmonary:      Effort: Pulmonary effort is normal.      Breath sounds: Normal breath sounds.   Musculoskeletal:         General: Tenderness present.      Thoracic back: No scoliosis.      Lumbar back: Tenderness and bony tenderness present. Normal range of motion. Positive left straight leg raise test. Negative right straight leg raise test. No scoliosis.      Right hip: No tenderness. Normal range of motion.      Left hip: No tenderness. Normal range of motion.      Right lower leg: No edema.      Left lower leg: No edema.   Neurological:      General: No focal deficit present.      Mental Status: He is alert and oriented to person, place, and time.      Deep Tendon Reflexes: Reflexes are normal and symmetric.         BMP  Lab Results   Component Value Date     (L) 01/19/2024    K 5.0 01/19/2024     01/19/2024    CO2 21 (L) 01/19/2024    BUN 32 (H) 01/19/2024    CREATININE 1.3 01/19/2024    CALCIUM 8.6 (L) 01/19/2024    ANIONGAP 9 01/19/2024    EGFRNORACEVR 56 (A) 01/19/2024     Lab Results   Component Value Date    WBC 6.88 01/19/2024    HGB 12.9 (L) 01/19/2024    HCT 38.8 (L) 01/19/2024     (H) 01/19/2024     01/19/2024       Lab Results   Component Value Date    LDLCALC 30.2 (L) 12/08/2023       Assessment:       1. Senile purpura    2. Acute bilateral low back pain with right-sided sciatica    3. Primary hypertension    4. Mixed hyperlipidemia              Plan:   1. Senile purpura  -     mupirocin (BACTROBAN) 2 % ointment; Apply topically 2 (two) times daily.  Dispense: 22 g; Refill: 3    2.  Acute bilateral low back pain with right-sided sciatica  Overview:  Continue physical therapy   Continue Celebrex   RTC as needed      3. Primary hypertension  Overview:  Two gram sodium diet.    Weight loss discussed.    Try to walk 2 miles per day.    Avoid smoking.    Current medications will be:  - Stop amlodipine 5 mg po daily.  Restart if S BP > 140.        Valsartan  40 mg at night  - aspirin (ECOTRIN) 81 MG EC tablet; Take 1 tablet (81 mg total) by mouth 2 (two) times daily.  -     Lasix 40 mg 1 tabs daily  - continue spironolactone 25 mg daily  Hold meds if BP less than 120        4. Mixed hyperlipidemia  Overview:  Low fat diet.  Avoid sweets.  A 10 pound weight loss by the next visit as a  good goal.  Increase consumption of fruits and vegetables, fish and chicken.  Use medications below:        fish oil-omega-3 fatty acids 300-1,000 mg capsule; Take 2 capsules (2 g total) by mouth once daily.  - Rosuvastatin 40 mg by mouth daily  -     Zetia 10 mg daily          RTC in 3 months

## 2024-04-30 ENCOUNTER — TELEPHONE (OUTPATIENT)
Dept: NEUROSURGERY | Facility: CLINIC | Age: 79
End: 2024-04-30
Payer: MEDICARE

## 2024-04-30 DIAGNOSIS — Z01.818 PREOPERATIVE EVALUATION TO RULE OUT SURGICAL CONTRAINDICATION: Primary | ICD-10-CM

## 2024-04-30 DIAGNOSIS — I70.0 AORTIC ATHEROSCLEROSIS: ICD-10-CM

## 2024-04-30 DIAGNOSIS — D49.9 NEOPLASM OF UNSPECIFIED BEHAVIOR OF UNSPECIFIED SITE: ICD-10-CM

## 2024-04-30 NOTE — TELEPHONE ENCOUNTER
Good Morning,  This patient is having spinal surgery with  on May 16. He will need pre-op clearance, he will need the following test completed      CBC  CMP  PT/PTT  Urinalysis    EKG  Chest Xray

## 2024-05-01 ENCOUNTER — TELEPHONE (OUTPATIENT)
Dept: NEUROSURGERY | Facility: CLINIC | Age: 79
End: 2024-05-01
Payer: MEDICARE

## 2024-05-01 DIAGNOSIS — M48.062 SPINAL STENOSIS OF LUMBAR REGION WITH NEUROGENIC CLAUDICATION: Primary | ICD-10-CM

## 2024-05-01 NOTE — TELEPHONE ENCOUNTER
Pt notified that labs and other test were ordered to clear him for surgery and that he can go get them done at [place]. Pt verbalized understanding and will go complete the test that were ordered.

## 2024-05-01 NOTE — TELEPHONE ENCOUNTER
Diagnoses and all orders for this visit:    Preoperative evaluation to rule out surgical contraindication  -     CBC Auto Differential; Future  -     Comprehensive Metabolic Panel; Future  -     Protime-INR; Future  -     APTT; Future  -     EKG 12-lead; Future  -     X-Ray Chest PA And Lateral; Future    Aortic atherosclerosis  -     Protime-INR; Future  -     APTT; Future  -     EKG 12-lead; Future    Neoplasm of unspecified behavior of unspecified site  -     APTT; Future      Tell patient to get these labs done

## 2024-05-02 ENCOUNTER — ANESTHESIA EVENT (OUTPATIENT)
Dept: SURGERY | Facility: HOSPITAL | Age: 79
End: 2024-05-02
Payer: MEDICARE

## 2024-05-03 ENCOUNTER — HOSPITAL ENCOUNTER (OUTPATIENT)
Dept: RADIOLOGY | Facility: HOSPITAL | Age: 79
Discharge: HOME | End: 2024-05-03
Attending: FAMILY MEDICINE
Payer: MEDICARE

## 2024-05-03 ENCOUNTER — HOSPITAL ENCOUNTER (OUTPATIENT)
Dept: PULMONOLOGY | Facility: HOSPITAL | Age: 79
Discharge: HOME | End: 2024-05-03
Attending: FAMILY MEDICINE
Payer: MEDICARE

## 2024-05-03 DIAGNOSIS — I70.0 AORTIC ATHEROSCLEROSIS: ICD-10-CM

## 2024-05-03 DIAGNOSIS — Z01.818 PREOPERATIVE EVALUATION TO RULE OUT SURGICAL CONTRAINDICATION: ICD-10-CM

## 2024-05-03 LAB
OHS QRS DURATION: 156 MS
OHS QTC CALCULATION: 492 MS

## 2024-05-03 PROCEDURE — 93010 ELECTROCARDIOGRAM REPORT: CPT | Mod: ,,, | Performed by: INTERNAL MEDICINE

## 2024-05-03 PROCEDURE — 93005 ELECTROCARDIOGRAM TRACING: CPT

## 2024-05-03 PROCEDURE — 99900035 HC TECH TIME PER 15 MIN (STAT)

## 2024-05-03 PROCEDURE — 71046 X-RAY EXAM CHEST 2 VIEWS: CPT | Mod: TC

## 2024-05-03 PROCEDURE — 71046 X-RAY EXAM CHEST 2 VIEWS: CPT | Mod: 26,,, | Performed by: RADIOLOGY

## 2024-05-06 ENCOUNTER — PATIENT MESSAGE (OUTPATIENT)
Dept: FAMILY MEDICINE | Facility: CLINIC | Age: 79
End: 2024-05-06
Payer: MEDICARE

## 2024-05-10 ENCOUNTER — HOSPITAL ENCOUNTER (OUTPATIENT)
Dept: PREADMISSION TESTING | Facility: HOSPITAL | Age: 79
Discharge: HOME | End: 2024-05-10
Attending: NURSE PRACTITIONER
Payer: MEDICARE

## 2024-05-10 ENCOUNTER — PATIENT MESSAGE (OUTPATIENT)
Dept: PREADMISSION TESTING | Facility: HOSPITAL | Age: 79
End: 2024-05-10

## 2024-05-10 RX ORDER — AMOXICILLIN 875 MG/1
875 TABLET, FILM COATED ORAL 2 TIMES DAILY
Status: ON HOLD | COMMUNITY
Start: 2024-05-06 | End: 2024-05-23 | Stop reason: HOSPADM

## 2024-05-10 NOTE — ANESTHESIA PREPROCEDURE EVALUATION
"                                                                                                             05/10/2024  Alejo Melvin is a 79 y.o., male scheduled for  FACETECTOMY (Left) - Procedure:Left L4-5, L5-S1 laminectomy, medial facetectomy, formanitomy(contralateral) 5/21/24.    Per family medicine Dr. Alston, "labs look good. He was medically cleared for surgery".  Per cardiology Dr. Darío Garcia, "Schedule for pharmacological perfusion study next week for preop evaluation given poor functional capacity and multiple comorbidities     Past Medical History:   Diagnosis Date    Arthritis     thumbs and knees    Back pain     Basal cell carcinoma (BCC) in situ of skin     Bilateral carpal tunnel syndrome 05/01/2019    Cancer     Elevated PSA, less than 10 ng/ml 03/20/2020    Hyperlipidemia     Hypertension     Obesity     Osteoporosis     Polyneuropathy     left foot    Prostate cancer     skin ca left cheek    Sleep apnea     cpap    Urinary incontinence     lasix causes urge incontenence      Past Surgical History:   Procedure Laterality Date    CARDIAC PACEMAKER PLACEMENT  02/01/2015    COLONOSCOPY      COLONOSCOPY N/A 3/27/2023    Procedure: COLONOSCOPY;  Surgeon: Leroy Alston MD;  Location: [facility];  Service: Endoscopy;  Laterality: N/A;    Lt knee      left knee torn skin and damaged cartilage    PROSTATE BIOPSY      rt heel      Achilles tendon repair    TOTAL KNEE ARTHROPLASTY Left 11/11/2021    Procedure: ARTHROPLASTY, KNEE, TOTAL-LIANNE;  Surgeon: Brian Hunt MD;  Location: [facility];  Service: Orthopedics;  Laterality: Left;     Review of patient's allergies indicates:  No Known Allergies    Current Outpatient Medications   Medication Instructions    abiraterone (ZYTIGA) 1,000 mg, Oral, Nightly    ascorbic acid, vitamin C, (VITAMIN C) 1000 MG tablet Take 2 tablets orally once in the morning.    aspirin (ECOTRIN) 81 mg, Oral, 2 times daily    b complex vitamins capsule 1 " capsule, Oral, Daily    celecoxib (CELEBREX) 200 MG capsule 2 times daily    cholecalciferol, vitamin D3, (VITAMIN D3) 50 mcg (2,000 unit) Cap 1 capsule, Oral, Daily    denosumab (PROLIA) 60 mg/mL Syrg     finasteride (PROSCAR) 5 mg, Oral, Daily    fluticasone propionate (FLONASE) 100 mcg, Each Nostril, Daily PRN    furosemide (LASIX) 40 mg, Oral, Every other day    gabapentin (NEURONTIN) 600 mg, Oral, 3 times daily    leuprolide acetate (6 month) (ELIGARD) 45 mg, Subcutaneous, Every 6 months    montelukast (SINGULAIR) 10 mg tablet TAKE ONE TABLET BY MOUTH EVERY EVENING    mupirocin (BACTROBAN) 2 % ointment Topical (Top), 2 times daily    predniSONE (DELTASONE) 5 mg, Oral, 2 times daily    rosuvastatin (CRESTOR) 20 mg, Oral    spironolactone (ALDACTONE) 25 mg, Oral, Daily    tamsulosin (FLOMAX) 0.4 mg Cap 1 capsule, Oral, Every other day    valsartan (DIOVAN) 40 mg, Oral, Nightly    venlafaxine (EFFEXOR) 75 mg, Oral    zinc 50 mg Tab No dose, route, or frequency recorded.       Pre-op Assessment    I have reviewed the Patient Summary Reports.    I have reviewed the NPO Status.   I have reviewed the Medications.   Steroids Taken In Past Year: Prednisone    Review of Systems  Anesthesia Hx:  No problems with previous Anesthesia               Denies Personal Hx of Anesthesia complications.                    Social:  Non-Smoker, No Alcohol Use       Hematology/Oncology:       -- Anemia:                    --  Cancer in past history:                     EENT/Dental:  chronic allergic rhinitis           Cardiovascular:  Exercise tolerance: good  Pacemaker Hypertension    Dysrhythmias (Atrioventricular block, Mobitz type 1, Wenckebach)   Denies Angina.     hyperlipidemia                             Pulmonary:       Denies Shortness of breath.  Sleep Apnea, CPAP           Education provided regarding risk of obstructive sleep apnea            Renal/:  Renal/ Normal                 Hepatic/GI:  Hepatic/GI Normal                  Musculoskeletal:  Arthritis               Neurological:  Denies TIA.  Denies CVA. Neuromuscular Disease,   Denies Seizures.          Peripheral Neuropathy (bilateral feet)                          Endocrine:  Denies Diabetes.         Morbid Obesity / BMI > 40      Physical Exam  General: Cooperative, Oriented and Alert    Airway:  Mallampati: III / II  Mouth Opening: Normal  TM Distance: Normal  Tongue: Normal  Neck ROM: Normal ROM    Dental:  Partial Dentures  Upper and lower partial dentures  Chest/Lungs:  Normal Respiratory Rate    Heart:  Rate: Normal      Lab Results   Component Value Date    WBC 5.39 05/03/2024    HGB 12.9 (L) 05/03/2024    HCT 38.9 (L) 05/03/2024     05/03/2024    CHOL 80 (L) 12/08/2023    TRIG 49 12/08/2023    HDL 40 12/08/2023    ALT 15 05/03/2024    AST 18 05/03/2024     05/03/2024    K 4.6 05/03/2024     05/03/2024    CREATININE 0.8 05/03/2024    BUN 17 05/03/2024    CO2 22 (L) 05/03/2024    TSH 1.442 12/08/2023    PSA 10.0 (H) 03/20/2020    INR 1.0 05/03/2024    HGBA1C 5.3 03/30/2021     Results for orders placed or performed during the hospital encounter of 05/03/24   EKG 12-lead    Collection Time: 05/03/24  9:15 AM   Result Value Ref Range    QRS Duration 156 ms    OHS QTC Calculation 492 ms    Narrative    Test Reason : Z01.818,I70.0,    Vent. Rate : 093 BPM     Atrial Rate : 093 BPM     P-R Int : 154 ms          QRS Dur : 156 ms      QT Int : 396 ms       P-R-T Axes : 055 -07 143 degrees     QTc Int : 492 ms    V paced  When compared with ECG of 19-JAN-2024 15:36,  Definite A spikes not seen  Confirmed by Kole JEFFERY MD ([id number]) on 5/3/2024 10:18:31 PM    Referred By: MARQUES MANUEL           Confirmed By:Kole JEFFERY MD     X-Ray Chest PA And Lateral  Narrative: EXAMINATION:  XR CHEST PA AND LATERAL    CLINICAL HISTORY:  Encounter for other preprocedural examination    TECHNIQUE:  PA and lateral views of the chest were  performed.    COMPARISON:  07/01/2019    FINDINGS:  Left-sided pacer device is in place.  The heart is enlarged.  There is central pulmonary vascular congestion.  No consolidation or pleural effusions.  Age-appropriate degenerative changes affect the skeleton.  Impression: As above.    Electronically signed by: Adali Melvin MD  Date:    05/03/2024  Time:    09:44    Cardiac Implanted Device  Type: Pacemaker  : [employer]tronic  Primary indication for placement:   Pacing-dependent:   Current settings:   Most recent interrogation:      Anesthesia Plan  Type of Anesthesia, risks & benefits discussed:    Anesthesia Type: Gen ETT  Intra-op Monitoring Plan: Standard ASA Monitors  Post Op Pain Control Plan: multimodal analgesia  Induction:  IV  Airway Plan: Direct, Post-Induction  Informed Consent: Informed consent signed with the Patient and all parties understand the risks and agree with anesthesia plan.  All questions answered.   ASA Score: 3  Day of Surgery Review of History & Physical: H&P Update referred to the surgeon/provider.    Ready For Surgery From Anesthesia Perspective.     .

## 2024-05-20 ENCOUNTER — TELEPHONE (OUTPATIENT)
Dept: ANESTHESIOLOGY | Facility: HOSPITAL | Age: 79
End: 2024-05-20
Payer: MEDICARE

## 2024-05-21 ENCOUNTER — HOSPITAL ENCOUNTER (OUTPATIENT)
Facility: HOSPITAL | Age: 79
Discharge: HOME HEALTH | End: 2024-05-23
Attending: NEUROLOGICAL SURGERY | Admitting: NEUROLOGICAL SURGERY
Payer: MEDICARE

## 2024-05-21 ENCOUNTER — ANESTHESIA (OUTPATIENT)
Dept: SURGERY | Facility: HOSPITAL | Age: 79
End: 2024-05-21
Payer: MEDICARE

## 2024-05-21 DIAGNOSIS — M48.062 SPINAL STENOSIS OF LUMBAR REGION WITH NEUROGENIC CLAUDICATION: ICD-10-CM

## 2024-05-21 DIAGNOSIS — G47.33 OSA ON CPAP: ICD-10-CM

## 2024-05-21 DIAGNOSIS — M48.062 LUMBAR STENOSIS WITH NEUROGENIC CLAUDICATION: Primary | ICD-10-CM

## 2024-05-21 DIAGNOSIS — I10 PRIMARY HYPERTENSION: ICD-10-CM

## 2024-05-21 DIAGNOSIS — M48.07 FORAMINAL STENOSIS OF LUMBOSACRAL REGION: ICD-10-CM

## 2024-05-21 PROBLEM — E66.01 CLASS 2 SEVERE OBESITY DUE TO EXCESS CALORIES WITH SERIOUS COMORBIDITY AND BODY MASS INDEX (BMI) OF 36.0 TO 36.9 IN ADULT: Status: RESOLVED | Noted: 2021-11-01 | Resolved: 2024-05-21

## 2024-05-21 PROBLEM — E66.812 CLASS 2 SEVERE OBESITY DUE TO EXCESS CALORIES WITH SERIOUS COMORBIDITY AND BODY MASS INDEX (BMI) OF 36.0 TO 36.9 IN ADULT: Status: RESOLVED | Noted: 2021-11-01 | Resolved: 2024-05-21

## 2024-05-21 LAB — POCT GLUCOSE: 115 MG/DL (ref 70–110)

## 2024-05-21 PROCEDURE — 25000003 PHARM REV CODE 250: Performed by: NEUROLOGICAL SURGERY

## 2024-05-21 PROCEDURE — 99900035 HC TECH TIME PER 15 MIN (STAT)

## 2024-05-21 PROCEDURE — 94761 N-INVAS EAR/PLS OXIMETRY MLT: CPT

## 2024-05-21 PROCEDURE — D9220A PRA ANESTHESIA: Mod: ANES,,, | Performed by: STUDENT IN AN ORGANIZED HEALTH CARE EDUCATION/TRAINING PROGRAM

## 2024-05-21 PROCEDURE — D9220A PRA ANESTHESIA: Mod: CRNA,,, | Performed by: NURSE ANESTHETIST, CERTIFIED REGISTERED

## 2024-05-21 PROCEDURE — 27201423 OPTIME MED/SURG SUP & DEVICES STERILE SUPPLY: Performed by: NEUROLOGICAL SURGERY

## 2024-05-21 PROCEDURE — 63600175 PHARM REV CODE 636 W HCPCS: Performed by: NURSE PRACTITIONER

## 2024-05-21 PROCEDURE — 63048 LAM FACETEC &FORAMOT EA ADDL: CPT | Mod: ,,, | Performed by: NEUROLOGICAL SURGERY

## 2024-05-21 PROCEDURE — 36620 INSERTION CATHETER ARTERY: CPT | Mod: 59,,, | Performed by: STUDENT IN AN ORGANIZED HEALTH CARE EDUCATION/TRAINING PROGRAM

## 2024-05-21 PROCEDURE — 71000033 HC RECOVERY, INTIAL HOUR: Performed by: NEUROLOGICAL SURGERY

## 2024-05-21 PROCEDURE — 36000711: Performed by: NEUROLOGICAL SURGERY

## 2024-05-21 PROCEDURE — 94660 CPAP INITIATION&MGMT: CPT

## 2024-05-21 PROCEDURE — 71000039 HC RECOVERY, EACH ADD'L HOUR: Performed by: NEUROLOGICAL SURGERY

## 2024-05-21 PROCEDURE — 63600175 PHARM REV CODE 636 W HCPCS: Performed by: NEUROLOGICAL SURGERY

## 2024-05-21 PROCEDURE — 63600175 PHARM REV CODE 636 W HCPCS: Performed by: NURSE ANESTHETIST, CERTIFIED REGISTERED

## 2024-05-21 PROCEDURE — C1729 CATH, DRAINAGE: HCPCS | Performed by: NEUROLOGICAL SURGERY

## 2024-05-21 PROCEDURE — 36000710: Performed by: NEUROLOGICAL SURGERY

## 2024-05-21 PROCEDURE — 25000003 PHARM REV CODE 250: Performed by: NURSE ANESTHETIST, CERTIFIED REGISTERED

## 2024-05-21 PROCEDURE — 37000008 HC ANESTHESIA 1ST 15 MINUTES: Performed by: NEUROLOGICAL SURGERY

## 2024-05-21 PROCEDURE — 37000009 HC ANESTHESIA EA ADD 15 MINS: Performed by: NEUROLOGICAL SURGERY

## 2024-05-21 PROCEDURE — 27000190 HC CPAP FULL FACE MASK W/VALVE

## 2024-05-21 PROCEDURE — 63047 LAM FACETEC & FORAMOT LUMBAR: CPT | Mod: 22,,, | Performed by: NEUROLOGICAL SURGERY

## 2024-05-21 RX ORDER — PROCHLORPERAZINE EDISYLATE 5 MG/ML
5 INJECTION INTRAMUSCULAR; INTRAVENOUS EVERY 30 MIN PRN
Status: DISCONTINUED | OUTPATIENT
Start: 2024-05-21 | End: 2024-05-21 | Stop reason: HOSPADM

## 2024-05-21 RX ORDER — FLUTICASONE PROPIONATE 50 MCG
2 SPRAY, SUSPENSION (ML) NASAL DAILY PRN
Status: DISCONTINUED | OUTPATIENT
Start: 2024-05-21 | End: 2024-05-23 | Stop reason: HOSPADM

## 2024-05-21 RX ORDER — ACETAMINOPHEN 325 MG/1
650 TABLET ORAL EVERY 6 HOURS
Status: DISCONTINUED | OUTPATIENT
Start: 2024-05-21 | End: 2024-05-23 | Stop reason: HOSPADM

## 2024-05-21 RX ORDER — HEPARIN SODIUM 5000 [USP'U]/ML
5000 INJECTION, SOLUTION INTRAVENOUS; SUBCUTANEOUS EVERY 8 HOURS
Status: DISCONTINUED | OUTPATIENT
Start: 2024-05-21 | End: 2024-05-23 | Stop reason: HOSPADM

## 2024-05-21 RX ORDER — TAMSULOSIN HYDROCHLORIDE 0.4 MG/1
1 CAPSULE ORAL EVERY OTHER DAY
Status: DISCONTINUED | OUTPATIENT
Start: 2024-05-22 | End: 2024-05-23 | Stop reason: HOSPADM

## 2024-05-21 RX ORDER — SODIUM CHLORIDE, SODIUM LACTATE, POTASSIUM CHLORIDE, CALCIUM CHLORIDE 600; 310; 30; 20 MG/100ML; MG/100ML; MG/100ML; MG/100ML
INJECTION, SOLUTION INTRAVENOUS CONTINUOUS
Status: DISCONTINUED | OUTPATIENT
Start: 2024-05-21 | End: 2024-05-22

## 2024-05-21 RX ORDER — METHOCARBAMOL 750 MG/1
750 TABLET, FILM COATED ORAL 3 TIMES DAILY PRN
Status: DISCONTINUED | OUTPATIENT
Start: 2024-05-21 | End: 2024-05-23 | Stop reason: HOSPADM

## 2024-05-21 RX ORDER — SPIRONOLACTONE 25 MG/1
25 TABLET ORAL DAILY
Status: DISCONTINUED | OUTPATIENT
Start: 2024-05-21 | End: 2024-05-23 | Stop reason: HOSPADM

## 2024-05-21 RX ORDER — DEXAMETHASONE SODIUM PHOSPHATE 4 MG/ML
INJECTION, SOLUTION INTRA-ARTICULAR; INTRALESIONAL; INTRAMUSCULAR; INTRAVENOUS; SOFT TISSUE
Status: DISCONTINUED | OUTPATIENT
Start: 2024-05-21 | End: 2024-05-21

## 2024-05-21 RX ORDER — OXYCODONE HYDROCHLORIDE 5 MG/1
5 TABLET ORAL
Status: DISCONTINUED | OUTPATIENT
Start: 2024-05-21 | End: 2024-05-21 | Stop reason: HOSPADM

## 2024-05-21 RX ORDER — MIDAZOLAM HYDROCHLORIDE 1 MG/ML
INJECTION INTRAMUSCULAR; INTRAVENOUS
Status: DISCONTINUED | OUTPATIENT
Start: 2024-05-21 | End: 2024-05-21

## 2024-05-21 RX ORDER — LIDOCAINE HYDROCHLORIDE 10 MG/ML
1 INJECTION, SOLUTION EPIDURAL; INFILTRATION; INTRACAUDAL; PERINEURAL ONCE
Status: DISCONTINUED | OUTPATIENT
Start: 2024-05-21 | End: 2024-05-21 | Stop reason: HOSPADM

## 2024-05-21 RX ORDER — HYDROMORPHONE HYDROCHLORIDE 2 MG/ML
1 INJECTION, SOLUTION INTRAMUSCULAR; INTRAVENOUS; SUBCUTANEOUS
Status: DISCONTINUED | OUTPATIENT
Start: 2024-05-21 | End: 2024-05-23 | Stop reason: HOSPADM

## 2024-05-21 RX ORDER — ROCURONIUM BROMIDE 10 MG/ML
INJECTION, SOLUTION INTRAVENOUS
Status: DISCONTINUED | OUTPATIENT
Start: 2024-05-21 | End: 2024-05-21

## 2024-05-21 RX ORDER — ALUMINUM HYDROXIDE, MAGNESIUM HYDROXIDE, AND SIMETHICONE 1200; 120; 1200 MG/30ML; MG/30ML; MG/30ML
30 SUSPENSION ORAL EVERY 4 HOURS PRN
Status: DISCONTINUED | OUTPATIENT
Start: 2024-05-21 | End: 2024-05-23 | Stop reason: HOSPADM

## 2024-05-21 RX ORDER — CELECOXIB 100 MG/1
200 CAPSULE ORAL
Status: COMPLETED | OUTPATIENT
Start: 2024-05-21 | End: 2024-05-21

## 2024-05-21 RX ORDER — CYCLOBENZAPRINE HCL 10 MG
10 TABLET ORAL
Status: COMPLETED | OUTPATIENT
Start: 2024-05-21 | End: 2024-05-21

## 2024-05-21 RX ORDER — ABIRATERONE 500 MG/1
1000 TABLET ORAL NIGHTLY
Status: DISCONTINUED | OUTPATIENT
Start: 2024-05-21 | End: 2024-05-23 | Stop reason: HOSPADM

## 2024-05-21 RX ORDER — SODIUM CHLORIDE, SODIUM LACTATE, POTASSIUM CHLORIDE, CALCIUM CHLORIDE 600; 310; 30; 20 MG/100ML; MG/100ML; MG/100ML; MG/100ML
INJECTION, SOLUTION INTRAVENOUS CONTINUOUS
Status: DISCONTINUED | OUTPATIENT
Start: 2024-05-21 | End: 2024-05-21

## 2024-05-21 RX ORDER — CEFAZOLIN SODIUM 1 G/3ML
INJECTION, POWDER, FOR SOLUTION INTRAMUSCULAR; INTRAVENOUS
Status: DISCONTINUED | OUTPATIENT
Start: 2024-05-21 | End: 2024-05-21

## 2024-05-21 RX ORDER — ONDANSETRON 8 MG/1
8 TABLET, ORALLY DISINTEGRATING ORAL EVERY 6 HOURS PRN
Status: DISCONTINUED | OUTPATIENT
Start: 2024-05-21 | End: 2024-05-23 | Stop reason: HOSPADM

## 2024-05-21 RX ORDER — VENLAFAXINE 37.5 MG/1
75 TABLET ORAL 2 TIMES DAILY
Status: DISCONTINUED | OUTPATIENT
Start: 2024-05-21 | End: 2024-05-23 | Stop reason: HOSPADM

## 2024-05-21 RX ORDER — PREDNISONE 5 MG/1
5 TABLET ORAL 2 TIMES DAILY
Status: DISCONTINUED | OUTPATIENT
Start: 2024-05-21 | End: 2024-05-23 | Stop reason: HOSPADM

## 2024-05-21 RX ORDER — OXYCODONE HCL 10 MG/1
10 TABLET, FILM COATED, EXTENDED RELEASE ORAL
Status: COMPLETED | OUTPATIENT
Start: 2024-05-21 | End: 2024-05-21

## 2024-05-21 RX ORDER — PROPOFOL 10 MG/ML
VIAL (ML) INTRAVENOUS
Status: DISCONTINUED | OUTPATIENT
Start: 2024-05-21 | End: 2024-05-21

## 2024-05-21 RX ORDER — OXYCODONE HYDROCHLORIDE 5 MG/1
10 TABLET ORAL EVERY 6 HOURS PRN
Status: DISCONTINUED | OUTPATIENT
Start: 2024-05-21 | End: 2024-05-23 | Stop reason: HOSPADM

## 2024-05-21 RX ORDER — BUPIVACAINE HCL/EPINEPHRINE 0.5-1:200K
VIAL (ML) INJECTION
Status: DISCONTINUED | OUTPATIENT
Start: 2024-05-21 | End: 2024-05-21 | Stop reason: HOSPADM

## 2024-05-21 RX ORDER — FINASTERIDE 5 MG/1
5 TABLET, FILM COATED ORAL DAILY
Status: DISCONTINUED | OUTPATIENT
Start: 2024-05-21 | End: 2024-05-23 | Stop reason: HOSPADM

## 2024-05-21 RX ORDER — PROCHLORPERAZINE EDISYLATE 5 MG/ML
5 INJECTION INTRAMUSCULAR; INTRAVENOUS EVERY 6 HOURS PRN
Status: DISCONTINUED | OUTPATIENT
Start: 2024-05-21 | End: 2024-05-23 | Stop reason: HOSPADM

## 2024-05-21 RX ORDER — MONTELUKAST SODIUM 10 MG/1
10 TABLET ORAL NIGHTLY
Status: DISCONTINUED | OUTPATIENT
Start: 2024-05-21 | End: 2024-05-23 | Stop reason: HOSPADM

## 2024-05-21 RX ORDER — HYDROMORPHONE HYDROCHLORIDE 2 MG/ML
INJECTION, SOLUTION INTRAMUSCULAR; INTRAVENOUS; SUBCUTANEOUS
Status: DISCONTINUED | OUTPATIENT
Start: 2024-05-21 | End: 2024-05-21

## 2024-05-21 RX ORDER — FENTANYL CITRATE 50 UG/ML
INJECTION, SOLUTION INTRAMUSCULAR; INTRAVENOUS
Status: DISCONTINUED | OUTPATIENT
Start: 2024-05-21 | End: 2024-05-21

## 2024-05-21 RX ORDER — HYDROMORPHONE HYDROCHLORIDE 2 MG/ML
0.2 INJECTION, SOLUTION INTRAMUSCULAR; INTRAVENOUS; SUBCUTANEOUS EVERY 5 MIN PRN
Status: DISCONTINUED | OUTPATIENT
Start: 2024-05-21 | End: 2024-05-21 | Stop reason: HOSPADM

## 2024-05-21 RX ORDER — BISACODYL 10 MG/1
10 SUPPOSITORY RECTAL DAILY
Status: DISCONTINUED | OUTPATIENT
Start: 2024-05-22 | End: 2024-05-23 | Stop reason: HOSPADM

## 2024-05-21 RX ORDER — KETOROLAC TROMETHAMINE 30 MG/ML
15 INJECTION, SOLUTION INTRAMUSCULAR; INTRAVENOUS EVERY 6 HOURS
Status: COMPLETED | OUTPATIENT
Start: 2024-05-21 | End: 2024-05-22

## 2024-05-21 RX ORDER — AMOXICILLIN 250 MG
2 CAPSULE ORAL NIGHTLY PRN
Status: DISCONTINUED | OUTPATIENT
Start: 2024-05-21 | End: 2024-05-23 | Stop reason: HOSPADM

## 2024-05-21 RX ORDER — PREGABALIN 75 MG/1
75 CAPSULE ORAL
Status: COMPLETED | OUTPATIENT
Start: 2024-05-21 | End: 2024-05-21

## 2024-05-21 RX ORDER — TRAMADOL HYDROCHLORIDE 50 MG/1
50 TABLET ORAL EVERY 6 HOURS PRN
Status: DISCONTINUED | OUTPATIENT
Start: 2024-05-21 | End: 2024-05-23 | Stop reason: HOSPADM

## 2024-05-21 RX ORDER — LIDOCAINE HYDROCHLORIDE 20 MG/ML
INJECTION INTRAVENOUS
Status: DISCONTINUED | OUTPATIENT
Start: 2024-05-21 | End: 2024-05-21

## 2024-05-21 RX ORDER — PHENYLEPHRINE HYDROCHLORIDE 10 MG/ML
INJECTION INTRAVENOUS
Status: DISCONTINUED | OUTPATIENT
Start: 2024-05-21 | End: 2024-05-21

## 2024-05-21 RX ORDER — ACETAMINOPHEN 325 MG/1
650 TABLET ORAL
Status: COMPLETED | OUTPATIENT
Start: 2024-05-21 | End: 2024-05-21

## 2024-05-21 RX ORDER — ONDANSETRON HYDROCHLORIDE 2 MG/ML
INJECTION, SOLUTION INTRAVENOUS
Status: DISCONTINUED | OUTPATIENT
Start: 2024-05-21 | End: 2024-05-21

## 2024-05-21 RX ORDER — MUPIROCIN 20 MG/G
OINTMENT TOPICAL 2 TIMES DAILY
Status: COMPLETED | OUTPATIENT
Start: 2024-05-21 | End: 2024-05-23

## 2024-05-21 RX ADMIN — PREDNISONE 5 MG: 5 TABLET ORAL at 09:05

## 2024-05-21 RX ADMIN — DEXAMETHASONE SODIUM PHOSPHATE 8 MG: 4 INJECTION, SOLUTION INTRA-ARTICULAR; INTRALESIONAL; INTRAMUSCULAR; INTRAVENOUS; SOFT TISSUE at 07:05

## 2024-05-21 RX ADMIN — HYDROMORPHONE HYDROCHLORIDE 0.4 MG: 2 INJECTION INTRAMUSCULAR; INTRAVENOUS; SUBCUTANEOUS at 11:05

## 2024-05-21 RX ADMIN — KETOROLAC TROMETHAMINE 15 MG: 30 INJECTION, SOLUTION INTRAMUSCULAR; INTRAVENOUS at 12:05

## 2024-05-21 RX ADMIN — CYCLOBENZAPRINE 10 MG: 10 TABLET, FILM COATED ORAL at 05:05

## 2024-05-21 RX ADMIN — ACETAMINOPHEN 650 MG: 325 TABLET ORAL at 02:05

## 2024-05-21 RX ADMIN — MIDAZOLAM HYDROCHLORIDE 1 MG: 1 INJECTION, SOLUTION INTRAMUSCULAR; INTRAVENOUS at 06:05

## 2024-05-21 RX ADMIN — PHENYLEPHRINE HYDROCHLORIDE 80 MCG: 10 INJECTION INTRAVENOUS at 08:05

## 2024-05-21 RX ADMIN — ROCURONIUM BROMIDE 50 MG: 10 INJECTION, SOLUTION INTRAVENOUS at 07:05

## 2024-05-21 RX ADMIN — SODIUM CHLORIDE, SODIUM LACTATE, POTASSIUM CHLORIDE, AND CALCIUM CHLORIDE: .6; .31; .03; .02 INJECTION, SOLUTION INTRAVENOUS at 08:05

## 2024-05-21 RX ADMIN — PREGABALIN 75 MG: 75 CAPSULE ORAL at 05:05

## 2024-05-21 RX ADMIN — CELECOXIB 200 MG: 100 CAPSULE ORAL at 05:05

## 2024-05-21 RX ADMIN — VENLAFAXINE 75 MG: 37.5 TABLET ORAL at 02:05

## 2024-05-21 RX ADMIN — OXYCODONE HYDROCHLORIDE 10 MG: 10 TABLET, FILM COATED, EXTENDED RELEASE ORAL at 05:05

## 2024-05-21 RX ADMIN — HYDROMORPHONE HYDROCHLORIDE 0.4 MG: 2 INJECTION INTRAMUSCULAR; INTRAVENOUS; SUBCUTANEOUS at 10:05

## 2024-05-21 RX ADMIN — HYDROMORPHONE HYDROCHLORIDE 0.4 MG: 2 INJECTION INTRAMUSCULAR; INTRAVENOUS; SUBCUTANEOUS at 09:05

## 2024-05-21 RX ADMIN — SODIUM CHLORIDE, POTASSIUM CHLORIDE, SODIUM LACTATE AND CALCIUM CHLORIDE: 600; 310; 30; 20 INJECTION, SOLUTION INTRAVENOUS at 05:05

## 2024-05-21 RX ADMIN — SODIUM CHLORIDE, SODIUM LACTATE, POTASSIUM CHLORIDE, AND CALCIUM CHLORIDE: .6; .31; .03; .02 INJECTION, SOLUTION INTRAVENOUS at 06:05

## 2024-05-21 RX ADMIN — ACETAMINOPHEN 650 MG: 325 TABLET ORAL at 05:05

## 2024-05-21 RX ADMIN — LIDOCAINE HYDROCHLORIDE 100 MG: 20 INJECTION, SOLUTION INTRAVENOUS at 07:05

## 2024-05-21 RX ADMIN — KETOROLAC TROMETHAMINE 15 MG: 30 INJECTION, SOLUTION INTRAMUSCULAR; INTRAVENOUS at 05:05

## 2024-05-21 RX ADMIN — VENLAFAXINE 75 MG: 37.5 TABLET ORAL at 09:05

## 2024-05-21 RX ADMIN — PROPOFOL 150 MG: 10 INJECTION, EMULSION INTRAVENOUS at 07:05

## 2024-05-21 RX ADMIN — ACETAMINOPHEN 650 MG: 325 TABLET ORAL at 07:05

## 2024-05-21 RX ADMIN — MUPIROCIN: 20 OINTMENT TOPICAL at 02:05

## 2024-05-21 RX ADMIN — MUPIROCIN: 20 OINTMENT TOPICAL at 09:05

## 2024-05-21 RX ADMIN — SUGAMMADEX 200 MG: 100 INJECTION, SOLUTION INTRAVENOUS at 11:05

## 2024-05-21 RX ADMIN — MONTELUKAST 10 MG: 10 TABLET, FILM COATED ORAL at 09:05

## 2024-05-21 RX ADMIN — PHENYLEPHRINE HYDROCHLORIDE 80 MCG: 10 INJECTION INTRAVENOUS at 07:05

## 2024-05-21 RX ADMIN — PREDNISONE 5 MG: 5 TABLET ORAL at 02:05

## 2024-05-21 RX ADMIN — ONDANSETRON 4 MG: 2 INJECTION, SOLUTION INTRAMUSCULAR; INTRAVENOUS at 10:05

## 2024-05-21 RX ADMIN — SPIRONOLACTONE 25 MG: 25 TABLET ORAL at 02:05

## 2024-05-21 RX ADMIN — CEFAZOLIN 3 G: 330 INJECTION, POWDER, FOR SOLUTION INTRAMUSCULAR; INTRAVENOUS at 07:05

## 2024-05-21 RX ADMIN — HEPARIN SODIUM 5000 UNITS: 5000 INJECTION INTRAVENOUS; SUBCUTANEOUS at 09:05

## 2024-05-21 RX ADMIN — FENTANYL CITRATE 100 MCG: 50 INJECTION INTRAMUSCULAR; INTRAVENOUS at 07:05

## 2024-05-21 RX ADMIN — FINASTERIDE 5 MG: 5 TABLET, FILM COATED ORAL at 02:05

## 2024-05-21 RX ADMIN — HEPARIN SODIUM 5000 UNITS: 5000 INJECTION INTRAVENOUS; SUBCUTANEOUS at 02:05

## 2024-05-21 NOTE — ANESTHESIA PROCEDURE NOTES
Intubation    Date/Time: 5/21/2024 7:23 AM    Performed by: Mallorie Rubio CRNA  Authorized by: Max Palacios MD    Intubation:     Induction:  Intravenous    Intubated:  Postinduction    Mask Ventilation:  Easy mask    Attempts:  1    Attempted By:  CRNA    Method of Intubation:  Video laryngoscopy    Blade:  Grimes 3    Laryngeal View Grade: Grade I - full view of cords      Difficult Airway Encountered?: No      Complications:  None    Airway Device:  Oral endotracheal tube    Airway Device Size:  7.5    Style/Cuff Inflation:  Cuffed (inflated to minimal occlusive pressure)    Inflation Amount (mL):  6    Tube secured:  21    Secured at:  The lips    Placement Verified By:  Capnometry    Complicating Factors:  None    Findings Post-Intubation:  BS equal bilateral and atraumatic/condition of teeth unchanged

## 2024-05-21 NOTE — TRANSFER OF CARE
Anesthesia Transfer of Care Note    Patient: Alejo Melvin    Procedure(s) Performed: Procedure(s) (LRB):  FACETECTOMY (Left)    Patient location: PACU    Anesthesia Type: general    Transport from OR: Transported from OR on 6-10 L/min O2 by face mask with adequate spontaneous ventilation    Post pain: adequate analgesia    Post assessment: no apparent anesthetic complications    Post vital signs: stable    Level of consciousness: awake, alert and oriented    Nausea/Vomiting: no nausea/vomiting    Complications: none    Transfer of care protocol was followed      Last vitals: Visit Vitals  /76   Pulse 62   Temp 36.5 °C (97.7 °F) (Skin)   Resp 11   Ht 6' (1.829 m)   Wt 135.6 kg (299 lb)   SpO2 100%   BMI 40.55 kg/m²

## 2024-05-21 NOTE — H&P
NEUROSURGICAL CONSULTATION NOTE     DATE OF SERVICE:  05/21/24     ATTENDING PHYSICIAN:  Max Avendano MD     SUBJECTIVE:     INTERIM HISTORY:     This is a very pleasant 78 y.o. male, complaining with worsening bilateral buttock pain radiating down his legs.  The pain travels below the knees.  Pain is interfering with his ability to walk or stand for long period of time.  Patient has developed an antalgic gait.  Pain is affecting his quality of life and functional status.  Pain is partially relieved by sitting down.  He has more right-sided pain than left-sided pain.  No new onset of motor weakness numbness or sphincter dysfunction symptoms.  He is completed physical therapy.  He has a Jehovah Witness.  He is taking gabapentin 300 mg 3 times daily.  He is also being treated for prostate cancer.  He has a pacemaker and can not get an MRI.  He is on aspirin 81 mg daily.           PAST MEDICAL HISTORY:       Active Ambulatory Problems     Diagnosis Date Noted    Hypertension      Hyperlipidemia      AR (allergic rhinitis) 10/04/2013    Colon cancer screening 02/25/2014    Polyp of ascending colon 02/25/2014    Diverticulosis 02/25/2014    Primary osteoarthritis of right knee 04/01/2016    Nasal polyp 04/01/2016    Other seasonal allergic rhinitis 10/04/2016    MARYLU on CPAP 10/04/2016    Polyneuropathy 05/01/2019    Pacemaker 05/01/2019    Class 2 severe obesity due to excess calories with serious comorbidity and body mass index (BMI) of 36.0 to 36.9 in adult 11/01/2021    Prostate cancer 08/22/2022    Hypotension due to drugs 12/04/2023    Aortic atherosclerosis 02/02/2024    Left hip pain 02/02/2024    Acute bilateral low back pain with right-sided sciatica 02/02/2024    Sacroiliitis 02/02/2024    Other specified degenerative diseases of nervous system 02/23/2024           Resolved Ambulatory Problems     Diagnosis Date Noted    Obstructive sleep apnea      Bradycardia 11/25/2014    Bilateral carpal tunnel syndrome  05/01/2019    Elevated PSA, less than 10 ng/ml 03/20/2020           Past Medical History:   Diagnosis Date    Arthritis      Back pain      Basal cell carcinoma (BCC) in situ of skin      Cancer      Obesity      Osteoporosis      Sleep apnea      Urinary incontinence           PAST SURGICAL HISTORY:        Past Surgical History:   Procedure Laterality Date    CARDIAC PACEMAKER PLACEMENT   02/01/2015    COLONOSCOPY        COLONOSCOPY N/A 3/27/2023     Procedure: COLONOSCOPY;  Surgeon: Leroy Alston MD;  Location: [facility];  Service: Endoscopy;  Laterality: N/A;    Lt knee         left knee torn skin and damaged cartilage    PROSTATE BIOPSY        rt heel         Achilles tendon repair    TOTAL KNEE ARTHROPLASTY Left 11/11/2021     Procedure: ARTHROPLASTY, KNEE, TOTAL-LIANNE;  Surgeon: Brian Hunt MD;  Location: [facility];  Service: Orthopedics;  Laterality: Left;         SOCIAL HISTORY:   Social History            Socioeconomic History    Marital status:        Spouse name: Yessenia Cote    Number of children: 5   Occupational History       Comment: [employer]-  age 62   Tobacco Use    Smoking status: Never       Passive exposure: Never    Smokeless tobacco: Never   Substance and Sexual Activity    Alcohol use: Not Currently    Drug use: Never    Sexual activity: Not Currently       Partners: Female   Social History Narrative     Lives with a house no stairs      Social Determinants of Health           Financial Resource Strain: Low Risk  (3/26/2024)     Overall Financial Resource Strain (CARDIA)      Difficulty of Paying Living Expenses: Not very hard   Food Insecurity: No Food Insecurity (3/26/2024)     Hunger Vital Sign      Worried About Running Out of Food in the Last Year: Never true      Ran Out of Food in the Last Year: Never true   Transportation Needs: No Transportation Needs (3/26/2024)     PRAPARE - Transportation      Lack of Transportation (Medical): No      Lack of  Transportation (Non-Medical): No   Physical Activity: Inactive (3/26/2024)     Exercise Vital Sign      Days of Exercise per Week: 0 days      Minutes of Exercise per Session: 0 min   Stress: No Stress Concern Present (3/26/2024)     [place] [place] [employer] - Occupational Stress Questionnaire      Feeling of Stress : Not at all   Social Connections: Socially Integrated (3/26/2024)     Social Connection and Isolation Panel [NHANES]      Frequency of Communication with Friends and Family: Three times a week      Frequency of Social Gatherings with Friends and Family: Once a week      Attends Faith Services: More than 4 times per year      Active Member of Clubs or Organizations: Yes      Attends Club or Organization Meetings: More than 4 times per year      Marital Status:    Housing Stability: Low Risk  (3/26/2024)     Housing Stability Vital Sign      Unable to Pay for Housing in the Last Year: No      Number of Places Lived in the Last Year: 1      Unstable Housing in the Last Year: No         FAMILY HISTORY:         Family History   Problem Relation Name Age of Onset    Cancer Father             CURRENTS MEDICATIONS:  Medications Ordered Prior to Encounter          Current Outpatient Medications on File Prior to Visit   Medication Sig Dispense Refill    abiraterone (ZYTIGA) 250 mg Tab Take 1,000 mg by mouth nightly.        ascorbic acid, vitamin C, (VITAMIN C) 1000 MG tablet Take 2 tablets orally once in the morning.        aspirin (ECOTRIN) 81 MG EC tablet Take 1 tablet (81 mg total) by mouth 2 (two) times daily. 60 tablet 0    b complex vitamins capsule Take 1 capsule by mouth once daily.        celecoxib (CELEBREX) 200 MG capsule TAKE ONE CAPSULE BY MOUTH DAILY WITH FOOD        cholecalciferol, vitamin D3, (VITAMIN D3) 50 mcg (2,000 unit) Cap Take 1 capsule by mouth once daily.        denosumab (PROLIA) 60 mg/mL Syrg          finasteride (PROSCAR) 5 mg tablet Take 5 mg by mouth once  daily.        fluticasone propionate (FLONASE) 50 mcg/actuation nasal spray 2 sprays (100 mcg total) by Each Nostril route daily as needed for Rhinitis. 16 g 5    furosemide (LASIX) 40 MG tablet Take 40 mg by mouth every other day.        gabapentin (NEURONTIN) 300 MG capsule Take 300 mg by mouth 3 (three) times daily.        leuprolide acetate, 6 month, (ELIGARD) 45 mg injection Inject 45 mg into the skin every 6 (six) months.        montelukast (SINGULAIR) 10 mg tablet TAKE ONE TABLET BY MOUTH EVERY EVENING 90 tablet 3    predniSONE (DELTASONE) 5 MG tablet Take 5 mg by mouth 2 (two) times daily.        rosuvastatin (CRESTOR) 20 MG tablet Take 20 mg by mouth.        spironolactone (ALDACTONE) 25 MG tablet Take 25 mg by mouth once daily.        tamsulosin (FLOMAX) 0.4 mg Cap Take 1 capsule by mouth every other day.        valsartan (DIOVAN) 40 MG tablet Take 1 tablet (40 mg total) by mouth every evening.        venlafaxine (EFFEXOR) 75 MG tablet Take 75 mg by mouth.        zinc 50 mg Tab                      Current Facility-Administered Medications on File Prior to Visit   Medication Dose Route Frequency Provider Last Rate Last Admin    fentaNYL 50 mcg/mL injection  mcg   mcg Intravenous PRN Garry Mcrae MD        midazolam (VERSED) 1 mg/mL injection 0.5-4 mg  0.5-4 mg Intravenous PRN Garry Mcrae MD                ALLERGIES:  Review of patient's allergies indicates:  No Known Allergies     REVIEW OF SYSTEMS:  Review of Systems   Constitutional:  Negative for diaphoresis, fever and weight loss.   Respiratory:  Negative for shortness of breath.    Cardiovascular:  Negative for chest pain.   Gastrointestinal:  Negative for blood in stool.   Genitourinary:  Negative for hematuria.   Endo/Heme/Allergies:  Does not bruise/bleed easily.   All other systems reviewed and are negative.        OBJECTIVE:     PHYSICAL EXAMINATION:       Vitals:     04/24/24 1616   BP: 130/80   Pulse: 83          Physical Exam:  Vitals reviewed.     Constitutional: He appears well-developed and well-nourished.      Eyes: Pupils are equal, round, and reactive to light. Conjunctivae and EOM are normal.      Cardiovascular: Normal distal pulses and no edema.      Abdominal: Soft.      Skin: Skin displays no rash on trunk and no rash on extremities. Skin displays no lesions on trunk and no lesions on extremities.      Psych/Behavior: He is alert. He is oriented to person, place, and time. He has a normal mood and affect.      Musculoskeletal:        Neck: Range of motion is full.      Neurological:        DTRs: Tricep reflexes are 0 on the right side and 0 on the left side. Bicep reflexes are 0 on the right side and 0 on the left side. Brachioradialis reflexes are 0 on the right side and 0 on the left side. Patellar reflexes are 0 on the right side and 0 on the left side. Achilles reflexes are 0 on the right side and 0 on the left side.      Back Exam      Muscle Strength   Right Quadriceps:  5/5   Left Quadriceps:  5/5   Right Hamstrings:  5/5   Left Hamstrings:  5/5               Neurologic Exam      Mental Status   Oriented to person, place, and time.   Speech: speech is normal   Level of consciousness: alert     Cranial Nerves   Cranial nerves II through XII intact.      CN III, IV, VI   Pupils are equal, round, and reactive to light.  Extraocular motions are normal.      Motor Exam   Muscle bulk: normal  Overall muscle tone: normal     Strength   Right deltoid: 5/5  Left deltoid: 5/5  Right biceps: 5/5  Left biceps: 5/5  Right triceps: 5/5  Left triceps: 5/5  Right wrist flexion: 5/5  Left wrist flexion: 5/5  Right wrist extension: 5/5  Left wrist extension: 5/5  Right interossei: 5/5  Left interossei: 5/5  Right iliopsoas: 5/5  Left iliopsoas: 5/5  Right quadriceps: 5/5  Left quadriceps: 5/5  Right hamstrin/5  Left hamstrin/5  Right anterior tibial: 5/5  Left anterior tibial: 5/5  Right posterior tibial:  5/5  Left posterior tibial: 5/5  Right peroneal: 5/5  Left peroneal: 5/5  Right gastroc: 5/5  Left gastroc: 5/5     Sensory Exam   Light touch normal.   Pinprick normal.      Gait, Coordination, and Reflexes      Gait  Gait: (Antalgic)     Reflexes   Right brachioradialis: 0  Left brachioradialis: 0  Right biceps: 0  Left biceps: 0  Right triceps: 0  Left triceps: 0  Right patellar: 0  Left patellar: 0  Right achilles: 0  Left achilles: 0  Right plantar: normal  Left plantar: normal  Right Garcia: absent  Left Garcia: absent  Right ankle clonus: absent  Left ankle clonus: absent        DIAGNOSTIC DATA:  I personally interpreted the following imaging:   CT of the lumbar spine shows severe L4-5 stenosis, right L5-S1 foraminal stenosis.  Ankylosis at L2-3 and T12-L1     ASSESSMENT:  This is a 78 y.o. male with      Problem List Items Addressed This Visit                  Orthopedic     Acute bilateral low back pain with right-sided sciatica     Overview       Continue physical therapy   Continue Celebrex   RTC as needed            Other Visit Diagnoses         Spinal stenosis of lumbar region with neurogenic claudication    -  Primary     Lumbar radiculopathy, chronic         Spinal stenosis of cervical region with radiculopathy         Foraminal stenosis of lumbosacral region                       PLAN:  I explained the natural history of the disease and all treatment options. I recommended a minimally invasive left L4-5, L5-S1 laminectomy, medial facetectomy and contralateral foraminotomy.  The goals of the surgery is to improve his neurogenic claudication symptoms and radiculopathy symptoms by decompressing the severe stenosis at those levels.     We have discussed the risks of surgery including death, coma, bleeding, infection, failure of surgery, CSF leak, nerve root injury, spinal cord injury, ureter injury, weakness, paralysis, peripheral neuropathy, need for reoperation. Patient understands the risks and  would like to proceed with surgery.     Patient has a BMI of 40             Max Avendano MD  Cell:[phone]

## 2024-05-21 NOTE — PROGRESS NOTES
VIRTUAL NURSE:  Cued into patient's room.  Permission received per patient to turn camera to view patient.  Introduced as VN that will be working with floor nurse and nursing assistant.  Educated patient on VN's role in patient care and  [facility] model.  Plan of care reviewed with patient.  Education per flowsheet.   Informed patient that staff will round on them every 2 hours but to use call light for any other needs they may have; informed of fall risk and fall precautions.  Patient verbalized understanding.  Call light within reach; bed siderails up x3.  Opportunity given for questions and questions answered.  Admission assessment questions answered.  Patient denies complaints or any needs at this time. Instructed to call for assistance.  Will cont to monitor and intervene as needed.    Labs, notes, orders, and careplan reviewed.        05/21/24 2246   Admission   Initial VN Admission Questions Complete   Communication Issues? None   Shift   Virtual Nurse - Rounding Complete   Pain Management Interventions quiet environment facilitated;relaxation techniques promoted   Virtual Nurse - Patient Verbalized Approval Of Camera Use;VN Rounding   Type of Frequent Check   Type Patient Rounds   Safety/Activity   Patient Rounds bed in low position;bed wheels locked;call light in patient/parent reach;clutter free environment maintained;ID band on;placement of personal items at bedside;visualized patient   Safety Promotion/Fall Prevention Fall Risk reviewed with patient/family;instructed to call staff for mobility

## 2024-05-21 NOTE — PLAN OF CARE
Pt has small laceration on his back, to the left of his midline incision. Laceration was bleeding. A pressure dressing with gauze and tape was applied and bleeding was controlled. Dr. Avendano was notified. He is aware. No new orders.

## 2024-05-21 NOTE — ANESTHESIA PROCEDURE NOTES
Arterial    Diagnosis: Surgery    Patient location during procedure: done in OR  Timeout: 5/21/2024 7:15 AM  Procedure end time: 5/21/2024 7:18 AM    Staffing  Authorizing Provider: Max Palacios MD  Performing Provider: Mallorie Rubio CRNA    Staffing  Performed by: Mallorie Rubio CRNA  Authorized by: Max Palacios MD    Anesthesiologist was present at the time of the procedure.    Preanesthetic Checklist  Completed: patient identified, IV checked, site marked, risks and benefits discussed, surgical consent, monitors and equipment checked, pre-op evaluation, timeout performed and anesthesia consent givenArterial  Skin Prep: chlorhexidine gluconate  Local Infiltration: none  Orientation: left  Location: radial    Catheter Size: 20 G  Catheter placement by Ultrasound guidance. Heme positive aspiration all ports.   Vessel Caliber: medium, patent, compressibility normal  Vascular Doppler:  not done  Needle advanced into vessel with real time Ultrasound guidance.  Guidewire confirmed in vessel.Insertion Attempts: 1  Assessment  Dressing: secured with tape and tegaderm  Patient: Tolerated well  Additional Notes  Juan C Perez SRNA

## 2024-05-21 NOTE — OP NOTE
Date of surgery 05/21/2024    Preop diagnosis   1. L4-5 severe spinal stenosis with radiculopathy and neurogenic claudication  2. Right L5-S1 severe foraminal stenosis with radiculopathy  3. Morbid obesity with BMI of 40.55    Postop diagnosis   Same    Surgery   1. Tubular L4-5 bilateral laminectomy, medial facetectomy, foraminotomy and L5-S1 laminectomy, medial facetectomy and right-sided foraminotomy  2. Fluoroscopy    Surgeon   Max Avendano MD    Indication   This is a very pleasant 78 y.o. male, complaining with worsening bilateral buttock pain radiating down his legs.  The pain travels below the knees.  Pain is interfering with his ability to walk or stand for long period of time.  Patient has developed an antalgic gait.  Pain is affecting his quality of life and functional status.  Pain is partially relieved by sitting down.  He has more right-sided pain than left-sided pain.  No new onset of motor weakness numbness or sphincter dysfunction symptoms.  He is completed physical therapy.  He has a Jehovah Witness.  He is taking gabapentin 300 mg 3 times daily.  He is also being treated for prostate cancer.  He has a pacemaker and can not get an MRI.  He is on aspirin 81 mg daily.     Complexity  This was deemed to be a more complex procedure requiring 2 hour more due to the morbid obesity of the patient.  More time was needed for the closure due to the thickness of the adipose tissue.    Procedure  Patient was intubated under general anesthesia and positioned prone on a Trey table.  All pressure points were carefully padded.  Using fluoroscopy we planned an incision from L4-S1 at 15 mm left to the midline.  Local anesthesia with 1% lidocaine with epi.  The skin was incised and hemostasis was carried out.  The thoracolumbar fascia was divided just left to the spinous processes using serial dilation we docked a tubular retractor measuring 18 mm x 8 cm at the junction of the left L4-5 lamina and medial facet.   Under microscopic visualization we proceeded with subperiosteal dissection of the multifidus.  Using the high-speed drill we drilled the inferior bilateral L4 and superior bilateral L5 laminectomy.  The yellow ligament was identified and resected in a piecemeal fashion to decompress the dura.  We then drilled the medial facet bilaterally to decompress the lateral recesses.  This was done with Kerrisons rongeur.  The contralateral L4-5 foramen was decompressed by removing part of the inferior articulating process of L4 and superior articular process of L5.  The exiting nerve root was fully decompressed by removing synovial tissue.  We then moved our retractor more inferiorly.  Again we proceeded with subperiosteal dissection.  We removed the remaining of the superior L5 lamina and identified the dura by removing yellow ligaments between L5 and S1.  We then drilled the contralateral medial pars and medial inferior articulating process of L5.  We probed the foramen and the contralateral disc space.  We then identified the exiting L5 nerve root on the right side and unroof the foramen by removing the remaining of the inferior articulating process of L5 and creating a tunnel above the L5 exiting nerve root.  Good decompression was achieved.  Hemostasis was carried out with bipolar and Gelfoam powder mixed with thrombin.  Irrigation and hemostasis.  In order to achieve a good closure of the fascia we extended the skin incision inferiorly.  We left a epidural 10 [place] drain and tunneled a drain superiorly through the skin.  The muscular fascia was closed primarily with 0 Vicryl.  Then left a epi fascial drain.  The adipose tissue was closed with interrupted 0 Vicryl.  The dermal layer was closed with inverted interrupted 2-0 Vicryl.  The skin was closed with a running vertical mattress suture using 2-0 nylon.  Bacitracin and sterile dressing.  Blood loss was estimated at 200 cc.  No complications.

## 2024-05-22 LAB
ANION GAP SERPL CALC-SCNC: 5 MMOL/L (ref 8–16)
BASOPHILS # BLD AUTO: 0.01 K/UL (ref 0–0.2)
BASOPHILS NFR BLD: 0.1 % (ref 0–1.9)
BUN SERPL-MCNC: 24 MG/DL (ref 8–23)
CALCIUM SERPL-MCNC: 8.2 MG/DL (ref 8.7–10.5)
CHLORIDE SERPL-SCNC: 105 MMOL/L (ref 95–110)
CO2 SERPL-SCNC: 25 MMOL/L (ref 23–29)
CREAT SERPL-MCNC: 0.8 MG/DL (ref 0.5–1.4)
DIFFERENTIAL METHOD BLD: ABNORMAL
EOSINOPHIL # BLD AUTO: 0 K/UL (ref 0–0.5)
EOSINOPHIL NFR BLD: 0 % (ref 0–8)
ERYTHROCYTE [DISTWIDTH] IN BLOOD BY AUTOMATED COUNT: 13.5 % (ref 11.5–14.5)
EST. GFR  (NO RACE VARIABLE): >60 ML/MIN/1.73 M^2
GLUCOSE SERPL-MCNC: 118 MG/DL (ref 70–110)
HCT VFR BLD AUTO: 33 % (ref 40–54)
HGB BLD-MCNC: 10.9 G/DL (ref 14–18)
IMM GRANULOCYTES # BLD AUTO: 0.03 K/UL (ref 0–0.04)
IMM GRANULOCYTES NFR BLD AUTO: 0.3 % (ref 0–0.5)
LYMPHOCYTES # BLD AUTO: 0.6 K/UL (ref 1–4.8)
LYMPHOCYTES NFR BLD: 6.3 % (ref 18–48)
MCH RBC QN AUTO: 32.5 PG (ref 27–31)
MCHC RBC AUTO-ENTMCNC: 33 G/DL (ref 32–36)
MCV RBC AUTO: 99 FL (ref 82–98)
MONOCYTES # BLD AUTO: 0.4 K/UL (ref 0.3–1)
MONOCYTES NFR BLD: 4.8 % (ref 4–15)
NEUTROPHILS # BLD AUTO: 7.7 K/UL (ref 1.8–7.7)
NEUTROPHILS NFR BLD: 88.5 % (ref 38–73)
NRBC BLD-RTO: 0 /100 WBC
PLATELET # BLD AUTO: 184 K/UL (ref 150–450)
PMV BLD AUTO: 10.1 FL (ref 9.2–12.9)
POCT GLUCOSE: 118 MG/DL (ref 70–110)
POCT GLUCOSE: 127 MG/DL (ref 70–110)
POCT GLUCOSE: 232 MG/DL (ref 70–110)
POTASSIUM SERPL-SCNC: 4.6 MMOL/L (ref 3.5–5.1)
RBC # BLD AUTO: 3.35 M/UL (ref 4.6–6.2)
SODIUM SERPL-SCNC: 135 MMOL/L (ref 136–145)
WBC # BLD AUTO: 8.68 K/UL (ref 3.9–12.7)

## 2024-05-22 PROCEDURE — 97165 OT EVAL LOW COMPLEX 30 MIN: CPT

## 2024-05-22 PROCEDURE — 97530 THERAPEUTIC ACTIVITIES: CPT

## 2024-05-22 PROCEDURE — 63600175 PHARM REV CODE 636 W HCPCS: Performed by: NEUROLOGICAL SURGERY

## 2024-05-22 PROCEDURE — 85025 COMPLETE CBC W/AUTO DIFF WBC: CPT | Performed by: NEUROLOGICAL SURGERY

## 2024-05-22 PROCEDURE — 97116 GAIT TRAINING THERAPY: CPT

## 2024-05-22 PROCEDURE — 80048 BASIC METABOLIC PNL TOTAL CA: CPT | Performed by: NEUROLOGICAL SURGERY

## 2024-05-22 PROCEDURE — 94761 N-INVAS EAR/PLS OXIMETRY MLT: CPT

## 2024-05-22 PROCEDURE — 94799 UNLISTED PULMONARY SVC/PX: CPT | Mod: XB

## 2024-05-22 PROCEDURE — 36415 COLL VENOUS BLD VENIPUNCTURE: CPT | Performed by: NEUROLOGICAL SURGERY

## 2024-05-22 PROCEDURE — 97162 PT EVAL MOD COMPLEX 30 MIN: CPT

## 2024-05-22 PROCEDURE — 99900035 HC TECH TIME PER 15 MIN (STAT)

## 2024-05-22 PROCEDURE — 25000003 PHARM REV CODE 250: Performed by: NEUROLOGICAL SURGERY

## 2024-05-22 RX ADMIN — ACETAMINOPHEN 650 MG: 325 TABLET ORAL at 06:05

## 2024-05-22 RX ADMIN — SODIUM CHLORIDE, POTASSIUM CHLORIDE, SODIUM LACTATE AND CALCIUM CHLORIDE: 600; 310; 30; 20 INJECTION, SOLUTION INTRAVENOUS at 02:05

## 2024-05-22 RX ADMIN — PREDNISONE 5 MG: 5 TABLET ORAL at 09:05

## 2024-05-22 RX ADMIN — HEPARIN SODIUM 5000 UNITS: 5000 INJECTION INTRAVENOUS; SUBCUTANEOUS at 09:05

## 2024-05-22 RX ADMIN — FINASTERIDE 5 MG: 5 TABLET, FILM COATED ORAL at 08:05

## 2024-05-22 RX ADMIN — HEPARIN SODIUM 5000 UNITS: 5000 INJECTION INTRAVENOUS; SUBCUTANEOUS at 01:05

## 2024-05-22 RX ADMIN — MUPIROCIN: 20 OINTMENT TOPICAL at 09:05

## 2024-05-22 RX ADMIN — PREDNISONE 5 MG: 5 TABLET ORAL at 08:05

## 2024-05-22 RX ADMIN — ACETAMINOPHEN 650 MG: 325 TABLET ORAL at 11:05

## 2024-05-22 RX ADMIN — KETOROLAC TROMETHAMINE 15 MG: 30 INJECTION, SOLUTION INTRAMUSCULAR; INTRAVENOUS at 12:05

## 2024-05-22 RX ADMIN — ACETAMINOPHEN 650 MG: 325 TABLET ORAL at 12:05

## 2024-05-22 RX ADMIN — VENLAFAXINE 75 MG: 37.5 TABLET ORAL at 08:05

## 2024-05-22 RX ADMIN — ABIRATERONE 1000 MG: 500 TABLET ORAL at 09:05

## 2024-05-22 RX ADMIN — MUPIROCIN: 20 OINTMENT TOPICAL at 08:05

## 2024-05-22 RX ADMIN — VENLAFAXINE 75 MG: 37.5 TABLET ORAL at 09:05

## 2024-05-22 RX ADMIN — SPIRONOLACTONE 25 MG: 25 TABLET ORAL at 08:05

## 2024-05-22 RX ADMIN — TAMSULOSIN HYDROCHLORIDE 0.4 MG: 0.4 CAPSULE ORAL at 08:05

## 2024-05-22 RX ADMIN — ABIRATERONE 1000 MG: 500 TABLET ORAL at 12:05

## 2024-05-22 RX ADMIN — HEPARIN SODIUM 5000 UNITS: 5000 INJECTION INTRAVENOUS; SUBCUTANEOUS at 06:05

## 2024-05-22 RX ADMIN — MONTELUKAST 10 MG: 10 TABLET, FILM COATED ORAL at 09:05

## 2024-05-22 NOTE — PLAN OF CARE
Problem: Occupational Therapy  Goal: Occupational Therapy Goal  Description: Goals to be met by: 6/22/24     Patient will increase functional independence with ADLs by performing:    LE Dressing with Modified [place].  Grooming while standing with Modified [place].  Toileting from toilet with Modified [place] for hygiene and clothing management.   Supine to sit with Modified [place].  Step transfer with Modified [place]  Toilet transfer to toilet with Modified [place].    Outcome: Progressing       Pt would benefit from cont OT services in order to maximize functional independence. Recommending low intensity therapy at d/c. Pt owns recommended DME.

## 2024-05-22 NOTE — PLAN OF CARE
Anticoagulants  Amiodarone  Beta-blocker  Monitor for bleeding   Pt on RA; will continue to monitor.

## 2024-05-22 NOTE — ANESTHESIA POSTPROCEDURE EVALUATION
Anesthesia Post Evaluation    Patient: Alejo Melvin    Procedure(s) Performed: Procedure(s) (LRB):  FACETECTOMY (Left)    Final Anesthesia Type: general      Patient location during evaluation: PACU  Patient participation: Yes- Able to Participate  Level of consciousness: awake and alert  Post-procedure vital signs: reviewed and stable  Pain management: adequate  Airway patency: patent  MARYLU mitigation strategies: Multimodal analgesia  PONV status at discharge: No PONV  Anesthetic complications: no      Cardiovascular status: hemodynamically stable  Respiratory status: spontaneous ventilation and room air  Hydration status: euvolemic  Follow-up not needed.              Vitals Value Taken Time   /80 05/22/24 0722   Temp 36.7 °C (98 °F) 05/22/24 0722   Pulse 70 05/22/24 0722   Resp 20 05/22/24 0722   SpO2 97 % 05/22/24 0802         Event Time   Out of Recovery 17:00:00         Pain/Joseph Score: Pain Rating Prior to Med Admin: 2 (5/22/2024  6:01 AM)  Pain Rating Post Med Admin: 2 (5/22/2024  1:15 AM)  Joseph Score: 10 (5/21/2024  4:50 PM)

## 2024-05-22 NOTE — PLAN OF CARE
Received pt on RA; no distress noted. CPAP setup and ready for use; will cont to monitor   Problem: Adult Inpatient Plan of Care  Goal: Plan of Care Review  Outcome: Progressing

## 2024-05-22 NOTE — PLAN OF CARE
AAO x 4.  Oriented to room/surroundings.  Regular diet.  Dinner meal provided.  ELEUTERIO drains intact.  Sanguineous drainage.  Patient brought home medication to hospital ([facility]) for Prostate cancer treatment.  Walked medication down to pharmacy for verification.  No numbness/no tingling at this time.  Safety maintained.  Encouraged to call for assistance.  Contacted on coming nurse to  from inpatient pharmacy.

## 2024-05-22 NOTE — NURSING
Pt removed CPAP, does not like fit of mask and does not want to continue to use. Offered to call respiratory to try to adjust mask and headpiece for more comfort, pt declined.

## 2024-05-22 NOTE — PLAN OF CARE
Virtual Nurse note: Patient chart, labs, and vitals reviewed. Care plan and goals updated as needed.   VN to continue to be available as needed.

## 2024-05-22 NOTE — PLAN OF CARE
Patient is AAOx4. Patient given medications as ordered per MAR. Scheduled Tylenol given for mild incisional back pain. Incisions site CDI. Midline back ELEUTERIO drains x2 CDI. Ambulated hallway using walker with PT/OT. Tolerating diet. Urinal provided and within reach. RAYSHAWN at bedside. Safety maintained. Bed alarm set. Instructed to use call light for assistance. Will continue to monitor.         Problem: Fall Injury Risk  Goal: Absence of Fall and Fall-Related Injury  Outcome: Progressing     Problem: Spinal Surgery  Goal: Optimal Coping with Surgery  Outcome: Progressing     Problem: Spinal Surgery  Goal: Optimal Pain Control and Function  Outcome: Progressing

## 2024-05-22 NOTE — PT/OT/SLP EVAL
Occupational Therapy   Evaluation/Treatment     Name: Alejo Melvin  MRN: [id number]  Admitting Diagnosis: Spinal stenosis of lumbar region with neurogenic claudication  Recent Surgery: Procedure(s) (LRB):  FACETECTOMY (Left)  LAMINECTOMY, SPINE (Bilateral) 1 Day Post-Op    Recommendations:     Discharge Recommendations: Low Intensity Therapy  Discharge Equipment Recommendations:  none  Barriers to discharge:  None    Assessment:     Alejo Melvin is a 79 y.o. male with a medical diagnosis of Spinal stenosis of lumbar region with neurogenic claudication.  He presents with The primary encounter diagnosis was Lumbar stenosis with neurogenic claudication. Diagnoses of Primary hypertension, MARYLU on CPAP, BMI 40.0-44.9, adult, Foraminal stenosis of lumbosacral region, and Spinal stenosis of lumbar region with neurogenic claudication were also pertinent to this visit.  . Performance deficits affecting function: weakness, impaired endurance, impaired self care skills, impaired functional mobility, gait instability, impaired balance, pain, orthopedic precautions.      Pt would benefit from cont OT services in order to maximize functional independence. Recommending low intensity therapy at d/c. Pt owns recommended DME.    Rehab Prognosis: Good; patient would benefit from acute skilled OT services to address these deficits and reach maximum level of function.       Plan:     Patient to be seen 5 x/week to address the above listed problems via self-care/home management, therapeutic activities, therapeutic exercises  Plan of Care Expires: 06/22/24  Plan of Care Reviewed with: patient, spouse    Subjective     Chief Complaint: no complaints; states pain he was having prior to surgery is no longer present   Patient/Family Comments/goals: return home without drains intact; return to dancing and hobbies he enjoys     Occupational Profile:  Living Environment: with spouse in [facility], thres to enter, WIS with built in seat and  grab bars   Previous level of function: independent without use of DME; drives; limited ax tolerance recently 2/2 increased back pain   Equipment Used at Home: none, grab bar (access to SPC, RW, BSC)  Assistance upon Discharge: from spouse     Pain/Comfort:  Pain Rating 1:  (did not rate)  Location - Orientation 1: generalized  Location 1: back  Pain Addressed 1: Reposition, Distraction, Cessation of Activity  Pain Rating Post-Intervention 1:  (did not rate)    Patients cultural, spiritual, Holiness conflicts given the current situation:      Objective:     Communicated with: eran prior to session.  Patient found up in chair with   upon OT entry to room.    General Precautions: Standard, fall  Orthopedic Precautions: spinal precautions  Braces: N/A  Respiratory Status: Room air    Occupational Performance:    Bed Mobility:    N/A     Functional Mobility/Transfers:  Patient completed Sit <> Stand Transfer with contact guard assistance  with  no assistive device   Functional Mobility: SBA/CGA with RW     Activities of Daily Living:  Grooming: contact guard assistance standing at sink;   Simulated LBD while seated in b/s chair with fig 4 technique following therapist's demo    Cognitive/Visual Perceptual:  WFL     Physical Exam:  Balance:    -       WFL seated; fair standing 2/2 pain   Postural examination/scapula alignment:    -       Rounded shoulders  Skin integrity: Wound surgical   Edema:  None noted  Sensation:    -       Intact  Dominant hand:    -       left  Upper Extremity Range of Motion:   BUE appears WFL for pt's needs based on function   Upper Extremity Strength:  BUE WFL for pt's needs based on function   Strength:  WFL     AMPAC 6 Click ADL:  AMPAC Total Score: 19    Treatment & Education:  Pt found up in chair  Re-educated on spinal precautions   Discussed and demo'd adaptive LBD techs with pt simulated return demo   Pt performed functional mobility initially without RW; antalgic gait noted and pt  with improvement with use of RW; cues for correct use/function of RW with mobility   Discussed use of cane and sequencing per pt request   Stood at sink for G&H axs   Pt educated on proper ergonomics for positioning while seated at computer     Patient left up in chair with all lines intact, call button in reach, nsg notified, and spouse present    GOALS:   Multidisciplinary Problems       Occupational Therapy Goals          Problem: Occupational Therapy    Goal Priority Disciplines Outcome Interventions   Occupational Therapy Goal     OT, PT/OT Progressing    Description: Goals to be met by: 6/22/24     Patient will increase functional independence with ADLs by performing:    LE Dressing with Modified [place].  Grooming while standing with Modified [place].  Toileting from toilet with Modified [place] for hygiene and clothing management.   Supine to sit with Modified [place].  Step transfer with Modified [place]  Toilet transfer to toilet with Modified [place].                         History:     Past Medical History:   Diagnosis Date    Arthritis     thumbs and knees    Back pain     Basal cell carcinoma (BCC) in situ of skin     Bilateral carpal tunnel syndrome 05/01/2019    Cancer     Elevated PSA, less than 10 ng/ml 03/20/2020    Hyperlipidemia     Hypertension     Obesity     Osteoporosis     Polyneuropathy     left foot    Prostate cancer     skin ca left cheek    Sleep apnea     cpap    Urinary incontinence     lasix causes urge incontenence          Past Surgical History:   Procedure Laterality Date    CARDIAC PACEMAKER PLACEMENT  02/01/2015    COLONOSCOPY      COLONOSCOPY N/A 3/27/2023    Procedure: COLONOSCOPY;  Surgeon: Leroy Alston MD;  Location: [facility];  Service: Endoscopy;  Laterality: N/A;    FACETECTOMY OF VERTEBRA Left 5/21/2024    Procedure: FACETECTOMY;  Surgeon: Max Avendano MD;  Location: [facility];  Service: Neurosurgery;  Laterality: Left;   Procedure:Left L4-5, L5-S1 laminectomy, medial facetectomy, formanitomy(contralateral)  Length of procedure: 2.5 hours  LOS:0-1 nights  Anesthesia:General  Blood:Type and screen  Radiology:C-arm  Microscope:Metrx  Bed:[place] [id number] Poster  Position:Prone  Lami segements:1    LAMINECTOMY Bilateral 5/21/2024    Procedure: LAMINECTOMY, SPINE;  Surgeon: Max Avendano MD;  Location: [facility];  Service: Neurosurgery;  Laterality: Bilateral;    Lt knee      left knee torn skin and damaged cartilage    PROSTATE BIOPSY      rt heel      Achilles tendon repair    TOTAL KNEE ARTHROPLASTY Left 11/11/2021    Procedure: ARTHROPLASTY, KNEE, TOTAL-LIANNE;  Surgeon: Brian Hunt MD;  Location: [facility];  Service: Orthopedics;  Laterality: Left;       Time Tracking:     OT Date of Treatment: 05/22/24  OT Start Time: 1135  OT Stop Time: 1154  OT Total Time (min): 19 min    Billable Minutes:Evaluation 9  Therapeutic Activity 10    5/22/2024

## 2024-05-22 NOTE — PLAN OF CARE
Patient seen for physical therapy evaluation on this date.  Patient with 2/10 low back pain, alert and eager for therapy.  Spouse present.  Patient will benefit from inpatient physical therapy to address functional limitations.  Recommending low intensity therapy per MD recommendations.  No DME needed.      Problem: Physical Therapy  Goal: Physical Therapy Goal  Description: Goals to be met by: 2024     Patient will increase functional independence with mobility by performin. Supine to sit with Modified [place]  2. Sit to supine with Modified [place]  3. Rolling to Left and Right with Modified [place].  4. Sit to stand transfer with Modified [place]  5. Bed to chair transfer with Modified [place] using Rolling Walker  6. Gait  x 150 feet with Modified [place] using Rolling Walker.     Outcome: Progressing

## 2024-05-22 NOTE — PLAN OF CARE
AAOx4. 2-3/10 pain throughout night. Medications administered per MAR. Safety precautions maintained. HOB 30. Neurochecks Q4. Call bell within reach.

## 2024-05-22 NOTE — PLAN OF CARE
SW met with Pt and Pt spouse Yessenia [phone] at bedside. Pt demographics confirmed. Pt independent with ADL's. Pt reports no need for HHS. Pt uses a CPAP daily. Pt not a dialysis or Coumadin Pt. Pt lives with spouse. Pt spouse will transport Pt home at D/C. No other needs identified. SW to request PCP f/u. SW to assist with any future needs.     Future Appointments   Date Time Provider Department Center   5/27/2024  2:30 PM Parag Martinez MD [facility] PAINMGT [place] [facility]   5/30/2024  2:00 PM Michelle Melo PA-C [facility] NEUROSU [facility]   6/28/2024 10:00 AM Michelle Melo PA-C [facility] NEUROSU [facility]   7/19/2024  9:00 AM Leroy Alston MD [facility] [place] [facility]   8/19/2024  3:00 PM Max Avendano MD [facility] NEUROSU [facility]       [facility] Med S[facility]  Discharge Assessment    Primary Care Provider: Leroy Alston MD     Discharge Assessment (most recent)       BRIEF DISCHARGE ASSESSMENT - 05/22/24 1243          Discharge Planning    Assessment Type Discharge Planning Brief Assessment (P)      Resource/Environmental Concerns none (P)      Support Systems Spouse/significant other (P)      Equipment Currently Used at Home CPAP (P)      Current Living Arrangements home (P)      Patient/Family Anticipates Transition to home;home with family (P)      Patient/Family Anticipated Services at Transition none (P)      DME Needed Upon Discharge  none (P)      Discharge Plan A Home (P)      Discharge Plan B Home with family (P)                          Kenya Molina LMSW  Phone: [phone]  [facility]

## 2024-05-22 NOTE — PROGRESS NOTES
[facility]  Neurosurgery  Progress Note    Subjective:     Interval History: Back pain controlled.  No leg pain.  Eating and drinking.  Urinating.    History of Present Illness:     This is a very pleasant 78 y.o. male, complaining with worsening bilateral buttock pain radiating down his legs. The pain travels below the knees. Pain is interfering with his ability to walk or stand for long period of time. Patient has developed an antalgic gait. Pain is affecting his quality of life and functional status. Pain is partially relieved by sitting down. He has more right-sided pain than left-sided pain. No new onset of motor weakness numbness or sphincter dysfunction symptoms. He is completed physical therapy. He has a Jehovah Witness. He is taking gabapentin 300 mg 3 times daily. He is also being treated for prostate cancer. He has a pacemaker and can not get an MRI. He is on aspirin 81 mg daily.     Post-Op Info:  Procedure(s) (LRB):  FACETECTOMY (Left)   1 Day Post-Op      Medications:  Continuous Infusions:  Scheduled Meds:   abiraterone  1,000 mg Oral Nightly    acetaminophen  650 mg Oral Q6H    bisacodyL  10 mg Rectal Daily    finasteride  5 mg Oral Daily    heparin (porcine)  5,000 Units Subcutaneous Q8H    montelukast  10 mg Oral QHS    mupirocin   Nasal BID    predniSONE  5 mg Oral BID    spironolactone  25 mg Oral Daily    tamsulosin  1 capsule Oral Every other day    venlafaxine  75 mg Oral BID     PRN Meds:  Current Facility-Administered Medications:     aluminum-magnesium hydroxide-simethicone, 30 mL, Oral, Q4H PRN    fluticasone propionate, 2 spray, Each Nostril, Daily PRN    HYDROmorphone, 1 mg, Subcutaneous, Q3H PRN    methocarbamoL, 750 mg, Oral, TID PRN    ondansetron, 8 mg, Oral, Q6H PRN    oxyCODONE, 10 mg, Oral, Q6H PRN    prochlorperazine, 5 mg, Intravenous, Q6H PRN    senna-docusate 8.6-50 mg, 2 tablet, Oral, Nightly PRN    traMADoL, 50 mg, Oral, Q6H PRN     Review of Systems  Objective:      Weight: (!) 141.9 kg (312 lb 13.3 oz)  Body mass index is 42.43 kg/m².  Vital Signs (Most Recent):  Temp: 98 °F (36.7 °C) (05/22/24 0722)  Pulse: 70 (05/22/24 0722)  Resp: 20 (05/22/24 0722)  BP: (!) 150/80 (05/22/24 0722)  SpO2: 97 % (05/22/24 0802) Vital Signs (24h Range):  Temp:  [97.6 °F (36.4 °C)-98 °F (36.7 °C)] 98 °F (36.7 °C)  Pulse:  [60-72] 70  Resp:  [9-22] 20  SpO2:  [92 %-100 %] 97 %  BP: (112-150)/(57-89) 150/80     Date 05/22/24 0700 - 05/23/24 0659   Shift 3187-3101 0224-2891 9058-6075 24 Hour Total   INTAKE   Shift Total(mL/kg)       OUTPUT   Drains 30   30   Shift Total(mL/kg) 30(0.2)   30(0.2)   Weight (kg) 141.9 141.9 141.9 141.9                            Closed/Suction Drain 05/21/24 1019 Tube - 1 Inferior;Medial Back Bulb 10 Fr. (Active)   Site Description Unable to view 05/21/24 1930   Dressing Type Gauze 05/21/24 1930   Dressing Status Dry;Intact 05/21/24 1930   Dressing Intervention Integrity maintained 05/21/24 1930   Drainage Serosanguineous 05/21/24 1930   Status To bulb suction 05/21/24 1930   Output (mL) 20 mL 05/22/24 0843            Closed/Suction Drain 05/21/24 1102 Tube - 2 Left;Medial Back Bulb 10 Fr. (Active)   Site Description Unable to view 05/21/24 1930   Dressing Type Gauze 05/21/24 1930   Dressing Status Dry;Intact 05/21/24 1930   Dressing Intervention Integrity maintained 05/21/24 1930   Drainage Serosanguineous 05/21/24 1930   Status To bulb suction 05/21/24 1930   Output (mL) 10 mL 05/22/24 0843       Neurosurgery Physical Exam    General: well developed, well nourished, no distress  Mental Status: Awake, Alert, Oriented X3.Thought content appropriate  GCS: Motor: 6/Verbal: 5/Eyes: 4 GCS Total: 15    Motor Strength:  Strength                                HF KF KE DF PF EHL   Lower: R 5/5 5/5 5/5 5/5 5/5 5/5    L 5/5 5/5 5/5 5/5 5/5 5/5       Garcia: absent B/L  Clonus: absent B/L  Incision- CDI  Drain-   1-125  2-80    Significant Labs:  Recent Labs   Lab  "05/22/24  0518   *   *   K 4.6      CO2 25   BUN 24*   CREATININE 0.8   CALCIUM 8.2*     Recent Labs   Lab 05/22/24  0518   WBC 8.68   HGB 10.9*   HCT 33.0*        No results for input(s): "LABPT", "INR", "APTT" in the last 48 hours.  Microbiology Results (last 7 days)       ** No results found for the last 168 hours. **              Assessment/Plan:     Active Diagnoses:    Diagnosis Date Noted POA    PRINCIPAL PROBLEM:  Spinal stenosis of lumbar region with neurogenic claudication [M48.062] 05/21/2024 Yes    Foraminal stenosis of lumbosacral region [M48.07] 05/21/2024 Yes    BMI 40.0-44.9, adult [Z68.41] 05/21/2024 Not Applicable    MARYLU on CPAP [G47.33] 10/04/2016 Yes    Hypertension [I10]  Yes      Problems Resolved During this Admission:     A/P:  POD #1 L5-S1 laminectomy     --Neurologically stable          -q4h neuro checks  --Drains: Continue  --Pain control  --DVT ppx: TEDs/SCDs/SQH  --Activity: PT/OT, OOB.  --Diet: Regular, Saline Lock IV  --Bowel regimen: PRN suppository, senna PRN  --Urinary: Voiding spontaneously  --Atelectasis ppx: Encourage IS hourly     Dispo: Pending PT/OT recs, drain.  Probable discharge tomorrow    All of the above discussed and reviewed with Dr. Avendano.    Michelle Melo PA-C  Neurosurgery  [facility] Surg    "

## 2024-05-22 NOTE — PT/OT/SLP EVAL
"Physical Therapy Evaluation    Patient Name:  Alejo Melvin   MRN:  [id number]    Recommendations:     Discharge Recommendations: Low Intensity Therapy (per MD recommendations)   Discharge Equipment Recommendations: none   Barriers to discharge: None    Assessment:     Alejo Melvin is a 79 y.o. male admitted with a medical diagnosis of Spinal stenosis of lumbar region with neurogenic claudication s/p L4-5, L5-S1 laminectomy and L facetectomy. He presents with the following impairments/functional limitations: impaired endurance, decreased ROM, impaired sensation, orthopedic precautions, impaired self care skills, decreased upper extremity function, impaired functional mobility, decreased lower extremity function, gait instability, edema, impaired balance, pain     Patient seen for physical therapy evaluation on this date.  Patient with 2/10 low back pain, alert and eager for therapy.  Spouse present.  Patient will benefit from inpatient physical therapy to address functional limitations.  Recommending low intensity therapy per MD recommendations.  No DME needed.    Rehab Prognosis: Good; patient would benefit from acute skilled PT services to address these deficits and reach maximum level of function.    Recent Surgery: Procedure(s) (LRB):  FACETECTOMY (Left)  LAMINECTOMY, SPINE (Bilateral) 1 Day Post-Op    Plan:     During this hospitalization, patient to be seen daily to address the identified rehab impairments via gait training, therapeutic activities, therapeutic exercises, neuromuscular re-education and progress toward the following goals:    Plan of Care Expires:  06/21/24    Subjective     Chief Complaint: "I am doing ok, eager to walk"  Patient/Family Comments/goals: to return to PLOF  Pain/Comfort:  Pain Rating 1: 2/10  Location - Side 1: Bilateral  Location - Orientation 1: lower  Location 1: back  Pain Addressed 1: Pre-medicate for activity, Reposition, Distraction, Cessation of Activity, Nurse " notified  Pain Rating Post-Intervention 1: 3/10    Patients cultural, spiritual, Temple conflicts given the current situation: no    Living Environment:  Patient lives with spouse in a 1 SH, threshold to enter, WIS with built in seat, GB, handheld shower.    Prior to admission, patients level of function was patient was independent with gait and ADLs, limited by pain.  Patient enjoys dancing with his spouse and wants to return to this.  Equipment used at home: walker, rolling, cane, straight, crutches, axillary, wheelchair, bedside commode.  DME owned (not currently used): rolling walker, single point cane, and wheelchair.  Upon discharge, patient will have assistance from spouse.    Objective:     Communicated with nurse Tameka prior to session.  Patient found HOB elevated with bed alarm, peripheral IV, 2 ELEUTERIO drain  upon PT entry to room.    General Precautions: Standard, fall  Orthopedic Precautions:spinal precautions   Braces: N/A  Respiratory Status: Room air    Exams:  Cognitive Exam:  Patient is oriented to Person, Place, Time, and Situation  Gross Motor Coordination:  WFL  Postural Exam:  Patient presented with the following abnormalities:    -       Rounded shoulders  Sensation:  Light Touch grossly intact, reports peripheral neuropathy in B feet  Skin Integrity/Edema:      -       Skin integrity:  Incision low back, 2 ELEUTERIO drains  -       Edema: Mild low back area and B LEs  RLE ROM: WFL  RLE Strength: WFL  LLE ROM: WFL  LLE Strength: WFL    Functional Mobility:  Bed Mobility:     Rolling Left:  stand by assistance with Vcs for log rolling  Supine to Sit: stand by assistance with VC for log rolling  Transfers:     Sit to Stand:  stand by assistance with rolling walker  x 2 trials, VC for technique  Gait: with RW x 140' with CG/SBA, slow pace, cues for avoiding twisting with turning  Balance: Seated:  independent    Standing:  requires RW, CGA      AM-PAC 6 CLICK MOBILITY  Total Score:23       Treatment &  Education:  Patient educated on spinal precautions.  Spouse present.  Patient educated to apply CP as needed for max 15-20 min at a time and to avoid heat initially.  Patient educated to use RW at home initially to provide steadying support.  Patient educated on progressive walking program for home.  Patient instructed to call to return back to bed, to perform B LE exercises while up in chair.      Patient left up in chair with all lines intact, call button in reach, chair alarm on, and nurse notified.    GOALS:   Multidisciplinary Problems       Physical Therapy Goals          Problem: Physical Therapy    Goal Priority Disciplines Outcome Goal Variances Interventions   Physical Therapy Goal     PT, PT/OT Progressing     Description: Goals to be met by: 2024     Patient will increase functional independence with mobility by performin. Supine to sit with Modified [place]  2. Sit to supine with Modified [place]  3. Rolling to Left and Right with Modified [place].  4. Sit to stand transfer with Modified [place]  5. Bed to chair transfer with Modified [place] using Rolling Walker  6. Gait  x 150 feet with Modified [place] using Rolling Walker.                          History:     Past Medical History:   Diagnosis Date    Arthritis     thumbs and knees    Back pain     Basal cell carcinoma (BCC) in situ of skin     Bilateral carpal tunnel syndrome 2019    Cancer     Elevated PSA, less than 10 ng/ml 2020    Hyperlipidemia     Hypertension     Obesity     Osteoporosis     Polyneuropathy     left foot    Prostate cancer     skin ca left cheek    Sleep apnea     cpap    Urinary incontinence     lasix causes urge incontenence        Past Surgical History:   Procedure Laterality Date    CARDIAC PACEMAKER PLACEMENT  2015    COLONOSCOPY      COLONOSCOPY N/A 3/27/2023    Procedure: COLONOSCOPY;  Surgeon: Leroy Alston MD;  Location: [facility];  Service:  Endoscopy;  Laterality: N/A;    FACETECTOMY OF VERTEBRA Left 5/21/2024    Procedure: FACETECTOMY;  Surgeon: Max Avendano MD;  Location: [facility];  Service: Neurosurgery;  Laterality: Left;  Procedure:Left L4-5, L5-S1 laminectomy, medial facetectomy, formanitomy(contralateral)  Length of procedure: 2.5 hours  LOS:0-1 nights  Anesthesia:General  Blood:Type and screen  Radiology:C-arm  Microscope:Metrx  Bed:[place] [id number] Poster  Position:Prone  Lami segements:1    LAMINECTOMY Bilateral 5/21/2024    Procedure: LAMINECTOMY, SPINE;  Surgeon: Max Avendano MD;  Location: [facility] OR;  Service: Neurosurgery;  Laterality: Bilateral;    Lt knee      left knee torn skin and damaged cartilage    PROSTATE BIOPSY      rt heel      Achilles tendon repair    TOTAL KNEE ARTHROPLASTY Left 11/11/2021    Procedure: ARTHROPLASTY, KNEE, TOTAL-LIANNE;  Surgeon: Brian Hunt MD;  Location: [facility];  Service: Orthopedics;  Laterality: Left;       Time Tracking:     PT Received On: 05/22/24  PT Start Time: 1010     PT Stop Time: 1040  PT Total Time (min): 30 min     Billable Minutes: Evaluation 10, Gait Training 10, and Therapeutic Activity 10      05/22/2024

## 2024-05-23 VITALS
HEIGHT: 72 IN | OXYGEN SATURATION: 97 % | BODY MASS INDEX: 42.66 KG/M2 | DIASTOLIC BLOOD PRESSURE: 78 MMHG | SYSTOLIC BLOOD PRESSURE: 131 MMHG | TEMPERATURE: 98 F | WEIGHT: 315 LBS | HEART RATE: 66 BPM | RESPIRATION RATE: 20 BRPM

## 2024-05-23 PROCEDURE — 27100171 HC OXYGEN HIGH FLOW UP TO 24 HOURS

## 2024-05-23 PROCEDURE — 97116 GAIT TRAINING THERAPY: CPT | Mod: CQ

## 2024-05-23 PROCEDURE — 97530 THERAPEUTIC ACTIVITIES: CPT

## 2024-05-23 PROCEDURE — 97110 THERAPEUTIC EXERCISES: CPT | Mod: CQ

## 2024-05-23 PROCEDURE — 99900035 HC TECH TIME PER 15 MIN (STAT)

## 2024-05-23 PROCEDURE — 63600175 PHARM REV CODE 636 W HCPCS: Performed by: NEUROLOGICAL SURGERY

## 2024-05-23 PROCEDURE — 94660 CPAP INITIATION&MGMT: CPT

## 2024-05-23 PROCEDURE — 94761 N-INVAS EAR/PLS OXIMETRY MLT: CPT

## 2024-05-23 PROCEDURE — 25000003 PHARM REV CODE 250: Performed by: NEUROLOGICAL SURGERY

## 2024-05-23 PROCEDURE — 97535 SELF CARE MNGMENT TRAINING: CPT

## 2024-05-23 RX ORDER — HYDROCODONE BITARTRATE AND ACETAMINOPHEN 10; 325 MG/1; MG/1
1 TABLET ORAL EVERY 6 HOURS PRN
Qty: 40 TABLET | Refills: 0 | Status: SHIPPED | OUTPATIENT
Start: 2024-05-23

## 2024-05-23 RX ORDER — METHOCARBAMOL 750 MG/1
750 TABLET, FILM COATED ORAL 2 TIMES DAILY PRN
Qty: 40 TABLET | Refills: 0 | Status: SHIPPED | OUTPATIENT
Start: 2024-05-23

## 2024-05-23 RX ADMIN — ACETAMINOPHEN 650 MG: 325 TABLET ORAL at 11:05

## 2024-05-23 RX ADMIN — MUPIROCIN: 20 OINTMENT TOPICAL at 08:05

## 2024-05-23 RX ADMIN — VENLAFAXINE 75 MG: 37.5 TABLET ORAL at 08:05

## 2024-05-23 RX ADMIN — ACETAMINOPHEN 650 MG: 325 TABLET ORAL at 12:05

## 2024-05-23 RX ADMIN — SPIRONOLACTONE 25 MG: 25 TABLET ORAL at 08:05

## 2024-05-23 RX ADMIN — PREDNISONE 5 MG: 5 TABLET ORAL at 08:05

## 2024-05-23 RX ADMIN — FINASTERIDE 5 MG: 5 TABLET, FILM COATED ORAL at 08:05

## 2024-05-23 RX ADMIN — HEPARIN SODIUM 5000 UNITS: 5000 INJECTION INTRAVENOUS; SUBCUTANEOUS at 05:05

## 2024-05-23 NOTE — PROGRESS NOTES
[facility] S[facility]  Neurosurgery  Progress Note    Subjective:     Interval History: Back pain controlled.  No leg pain.  Eating and drinking.  Urinating. Walking well.    History of Present Illness:     This is a very pleasant 78 y.o. male, complaining with worsening bilateral buttock pain radiating down his legs. The pain travels below the knees. Pain is interfering with his ability to walk or stand for long period of time. Patient has developed an antalgic gait. Pain is affecting his quality of life and functional status. Pain is partially relieved by sitting down. He has more right-sided pain than left-sided pain. No new onset of motor weakness numbness or sphincter dysfunction symptoms. He is completed physical therapy. He has a Jehovah Witness. He is taking gabapentin 300 mg 3 times daily. He is also being treated for prostate cancer. He has a pacemaker and can not get an MRI. He is on aspirin 81 mg daily.     Post-Op Info:  Procedure(s) (LRB):  FACETECTOMY (Left)  LAMINECTOMY, SPINE (Bilateral)   2 Days Post-Op      Medications:  Continuous Infusions:  Scheduled Meds:   abiraterone  1,000 mg Oral Nightly    acetaminophen  650 mg Oral Q6H    bisacodyL  10 mg Rectal Daily    finasteride  5 mg Oral Daily    heparin (porcine)  5,000 Units Subcutaneous Q8H    montelukast  10 mg Oral QHS    predniSONE  5 mg Oral BID    spironolactone  25 mg Oral Daily    tamsulosin  1 capsule Oral Every other day    venlafaxine  75 mg Oral BID     PRN Meds:  Current Facility-Administered Medications:     aluminum-magnesium hydroxide-simethicone, 30 mL, Oral, Q4H PRN    fluticasone propionate, 2 spray, Each Nostril, Daily PRN    HYDROmorphone, 1 mg, Subcutaneous, Q3H PRN    methocarbamoL, 750 mg, Oral, TID PRN    ondansetron, 8 mg, Oral, Q6H PRN    oxyCODONE, 10 mg, Oral, Q6H PRN    prochlorperazine, 5 mg, Intravenous, Q6H PRN    senna-docusate 8.6-50 mg, 2 tablet, Oral, Nightly PRN    traMADoL, 50 mg, Oral, Q6H PRN     Review of  Systems  Objective:     Weight: (!) 143.7 kg (316 lb 12.8 oz)  Body mass index is 42.97 kg/m².  Vital Signs (Most Recent):  Temp: 97.8 °F (36.6 °C) (05/23/24 0730)  Pulse: 66 (05/23/24 0730)  Resp: 20 (05/23/24 0730)  BP: 131/78 (05/23/24 0821)  SpO2: 97 % (05/23/24 0730) Vital Signs (24h Range):  Temp:  [97.8 °F (36.6 °C)-98.5 °F (36.9 °C)] 97.8 °F (36.6 °C)  Pulse:  [59-70] 66  Resp:  [18-20] 20  SpO2:  [94 %-99 %] 97 %  BP: (111-138)/(53-78) 131/78     Date 05/23/24 0700 - 05/24/24 0659   Shift 9404-4246 7386-6323 9892-3684 24 Hour Total   INTAKE   Shift Total(mL/kg)       OUTPUT   Urine(mL/kg/hr) 525   525   Drains 30   30   Shift Total(mL/kg) 555(3.9)   555(3.9)   Weight (kg) 143.7 143.7 143.7 143.7              Oxygen Concentration (%):  [21] 21             Closed/Suction Drain 05/21/24 1019 Tube - 1 Inferior;Medial Back Bulb 10 Fr. (Active)   Site Description Unable to view 05/21/24 1930   Dressing Type Gauze 05/21/24 1930   Dressing Status Dry;Intact 05/21/24 1930   Dressing Intervention Integrity maintained 05/21/24 1930   Drainage Serosanguineous 05/21/24 1930   Status To bulb suction 05/21/24 1930   Output (mL) 20 mL 05/22/24 0843            Closed/Suction Drain 05/21/24 1102 Tube - 2 Left;Medial Back Bulb 10 Fr. (Active)   Site Description Unable to view 05/21/24 1930   Dressing Type Gauze 05/21/24 1930   Dressing Status Dry;Intact 05/21/24 1930   Dressing Intervention Integrity maintained 05/21/24 1930   Drainage Serosanguineous 05/21/24 1930   Status To bulb suction 05/21/24 1930   Output (mL) 10 mL 05/22/24 0843       Neurosurgery Physical Exam    General: well developed, well nourished, no distress  Mental Status: Awake, Alert, Oriented X3.Thought content appropriate  GCS: Motor: 6/Verbal: 5/Eyes: 4 GCS Total: 15    Motor Strength:  Strength                                HF KF KE DF PF EHL   Lower: R 5/5 5/5 5/5 5/5 5/5 5/5    L 5/5 5/5 5/5 5/5 5/5 5/5       Garcia: absent B/L  Clonus: absent  "B/L  Incision- CDI  Drain-   1-120  2-85    Significant Labs:  Recent Labs   Lab 05/22/24  0518   *   *   K 4.6      CO2 25   BUN 24*   CREATININE 0.8   CALCIUM 8.2*     Recent Labs   Lab 05/22/24  0518   WBC 8.68   HGB 10.9*   HCT 33.0*        No results for input(s): "LABPT", "INR", "APTT" in the last 48 hours.  Microbiology Results (last 7 days)       ** No results found for the last 168 hours. **              Assessment/Plan:     Active Diagnoses:    Diagnosis Date Noted POA    PRINCIPAL PROBLEM:  Spinal stenosis of lumbar region with neurogenic claudication [M48.062] 05/21/2024 Yes    Foraminal stenosis of lumbosacral region [M48.07] 05/21/2024 Yes    BMI 40.0-44.9, adult [Z68.41] 05/21/2024 Not Applicable    MARYLU on CPAP [G47.33] 10/04/2016 Yes    Hypertension [I10]  Yes      Problems Resolved During this Admission:     A/P:  POD #2 L5-S1 laminectomy     --Neurologically stable          -q4h neuro checks  --Drains: Removed.  --Pain control  --DVT ppx: TEDs/SCDs/SQH  --Activity: PT/OT, OOB.  --Diet: Regular, Saline Lock IV  --Bowel regimen: PRN suppository, senna PRN  --Urinary: Voiding spontaneously  --Atelectasis ppx: Encourage IS hourly     Dispo: DC home today with home health    All of the above discussed and reviewed with Dr. Avendano.    Michelle Melo PADeweyC  Neurosurgery  [facility] - Med Surg    "

## 2024-05-23 NOTE — PLAN OF CARE
Problem: Adult Inpatient Plan of Care  Goal: Plan of Care Review  Outcome: Progressing  Goal: Patient-Specific Goal (Individualized)  Outcome: Progressing  Goal: Absence of Hospital-Acquired Illness or Injury  Outcome: Progressing  Goal: Optimal Comfort and Wellbeing  Outcome: Progressing  Goal: Readiness for Transition of Care  Outcome: Progressing     Problem: Bariatric Environmental Safety  Goal: Safety Maintained with Care  Outcome: Progressing     Problem: Infection  Goal: Absence of Infection Signs and Symptoms  Outcome: Progressing     Problem: Wound  Goal: Optimal Coping  Outcome: Progressing  Goal: Optimal Functional Ability  Outcome: Progressing  Goal: Absence of Infection Signs and Symptoms  Outcome: Progressing  Goal: Improved Oral Intake  Outcome: Progressing  Goal: Optimal Pain Control and Function  Outcome: Progressing  Goal: Skin Health and Integrity  Outcome: Progressing  Goal: Optimal Wound Healing  Outcome: Progressing     Problem: Fall Injury Risk  Goal: Absence of Fall and Fall-Related Injury  Outcome: Progressing     Problem: Skin Injury Risk Increased  Goal: Skin Health and Integrity  Outcome: Progressing     Problem: Comorbidity Management  Goal: Blood Pressure in Desired Range  Outcome: Progressing     Problem: Spinal Surgery  Goal: Optimal Coping with Surgery  Outcome: Progressing  Goal: Absence of Bleeding  Outcome: Progressing  Goal: Effective Bowel Elimination  Outcome: Progressing  Goal: Fluid and Electrolyte Balance  Outcome: Progressing  Goal: Optimal Functional Ability  Outcome: Progressing  Goal: Absence of Infection Signs and Symptoms  Outcome: Progressing  Goal: Optimal Neurologic Function  Outcome: Progressing  Goal: Anesthesia/Sedation Recovery  Outcome: Progressing  Goal: Optimal Pain Control and Function  Outcome: Progressing  Goal: Nausea and Vomiting Relief  Outcome: Progressing  Goal: Effective Urinary Elimination  Outcome: Progressing  Goal: Effective Oxygenation and  Ventilation  Outcome: Progressing

## 2024-05-23 NOTE — PT/OT/SLP PROGRESS
Physical Therapy Treatment    Patient Name:  Alejo Melvin   MRN:  [id number]    Recommendations:     Discharge Recommendations: Low Intensity Therapy  Discharge Equipment Recommendations: none  Barriers to discharge:  decreased mobility,strength and endurance    Assessment:     Alejo Melvin is a 79 y.o. male admitted with a medical diagnosis of Spinal stenosis of lumbar region with neurogenic claudication.  He presents with the following impairments/functional limitations: weakness, impaired endurance, impaired functional mobility, gait instability, impaired balance, decreased lower extremity function, decreased ROM, impaired coordination, impaired skin, impaired joint extensibility, orthopedic precautions,pt with improving status and will benefit from low intensity therapy upon discharge.    Rehab Prognosis: Good; patient would benefit from acute skilled PT services to address these deficits and reach maximum level of function.    Recent Surgery: Procedure(s) (LRB):  FACETECTOMY (Left)  LAMINECTOMY, SPINE (Bilateral) 2 Days Post-Op    Plan:     During this hospitalization, patient to be seen daily to address the identified rehab impairments via gait training, therapeutic activities, therapeutic exercises, neuromuscular re-education and progress toward the following goals:    Plan of Care Expires:  06/21/24    Subjective     Chief Complaint: n/a  Patient/Family Comments/goals: pt mat go home today.  Pain/Comfort:  Pain Rating 1:  (no c/o's)      Objective:     Communicated with nsg prior to session.  Patient found supine with bed alarm, peripheral IV, ELEUTERIO drain upon PT entry to room.     General Precautions: Standard, fall  Orthopedic Precautions: spinal precautions  Braces: N/A  Respiratory Status: Room air     Functional Mobility:  Bed Mobility:     Supine to Sit: modified independence  Transfers:  Sit to Stand:  supervision with rolling walker  Bed to Chair: supervision with  rolling walker  using   ambulation  Gait: amb ~300' with RW and S  Balance: fair standing balance with RW      AM-PAC 6 CLICK MOBILITY  Turning over in bed (including adjusting bedclothes, sheets and blankets)?: 4  Sitting down on and standing up from a chair with arms (e.g., wheelchair, bedside commode, etc.): 4  Moving from lying on back to sitting on the side of the bed?: 4  Moving to and from a bed to a chair (including a wheelchair)?: 4  Need to walk in hospital room?: 4  Climbing 3-5 steps with a railing?: 3  Basic Mobility Total Score: 23       Treatment & Education: le seated ex's x 10-12 reps inc: ap's,laq's and hip flex,pt's requests met.      Patient left up in chair with all lines intact, call button in reach, nsg notified, and spouse present..    GOALS: see general POC  Multidisciplinary Problems       Physical Therapy Goals          Problem: Physical Therapy    Goal Priority Disciplines Outcome Goal Variances Interventions   Physical Therapy Goal     PT, PT/OT Progressing     Description: Goals to be met by: 2024     Patient will increase functional independence with mobility by performin. Supine to sit with Modified [place]  2. Sit to supine with Modified [place]  3. Rolling to Left and Right with Modified [place].  4. Sit to stand transfer with Modified [place]  5. Bed to chair transfer with Modified [place] using Rolling Walker  6. Gait  x 150 feet with Modified [place] using Rolling Walker.                          Time Tracking:     PT Received On: 24  PT Start Time: 826     PT Stop Time: 0850  PT Total Time (min): 24 min     Billable Minutes: Gait Training 15 and Therapeutic Exercise 9    Treatment Type: Treatment  PT/PTA: PTA     Number of PTA visits since last PT visit: 2024

## 2024-05-23 NOTE — NURSING
AVS reviewed with patient and spouse by virtual nurse. Verbalized understanding of upcoming appointments and home medications. IV removed. Prescriptions delivered at bedside. Home medication returned to patient. Dressing applied to back. W/c transport to escort patient and belongings to main lobby.

## 2024-05-23 NOTE — PT/OT/SLP PROGRESS
Occupational Therapy   Treatment/Dc Summary     Name: Alejo Melvin  MRN: [id number]  Admitting Diagnosis:  Spinal stenosis of lumbar region with neurogenic claudication  2 Days Post-Op    Recommendations:     Discharge Recommendations: Low Intensity Therapy  Discharge Equipment Recommendations:  none  Barriers to discharge:  None    Assessment:     Alejo Melvin is a 79 y.o. male with a medical diagnosis of Spinal stenosis of lumbar region with neurogenic claudication.  He presents with The primary encounter diagnosis was Lumbar stenosis with neurogenic claudication. Diagnoses of Primary hypertension, MARYLU on CPAP, BMI 40.0-44.9, adult, Foraminal stenosis of lumbosacral region, and Spinal stenosis of lumbar region with neurogenic claudication were also pertinent to this visit.  . Performance deficits affecting function are weakness, impaired endurance, impaired self care skills, impaired functional mobility, gait instability, impaired balance, orthopedic precautions, pain.       Pt d/c from hospital post session. Pt able to perform axs with SBA- Min A,. Educated pt and spouse about home safety, spinal precautions, and d/c recs. D/c OT       Rehab Prognosis:  Good; patient would benefit from acute skilled OT services to address these deficits and reach maximum level of function.       Plan:     Patient to be seen  (d/c OT) to address the above listed problems via self-care/home management, therapeutic activities, therapeutic exercises  Plan of Care Expires: 05/23/24  Plan of Care Reviewed with: patient, spouse    Subjective     Chief Complaint: no complaints   Patient/Family Comments/goals: return to dancing   Pain/Comfort:  Pain Rating 1:  (did not rate)  Location 1: back  Pain Addressed 1: Reposition, Distraction, Cessation of Activity  Pain Rating Post-Intervention 1:  (did not rate)    Objective:     Communicated with: nsg prior to session.  Patient found supine with   upon OT entry to room.    General  Precautions: Standard, fall    Orthopedic Precautions:spinal precautions  Braces: N/A  Respiratory Status: Room air     Occupational Performance:     Bed Mobility:    Patient completed Scooting/Bridging with modified independence  Patient completed Supine to Sit with modified independence  Patient completed Sit to Supine with modified independence     Functional Mobility/Transfers:  N/A     Activities of Daily Living:  Pt found fully dressed       [facility] 6 Click ADL: 21    Treatment & Education:  Pt found fully dressed and supine with spouse present   Pt able to recall spinal precautions   Reviewed adaptive LBD; discussed use of adaptive DME for dsg to decrease difficulty   Discussed d/c recs      Patient left supine with all lines intact, call button in reach, nsg notified, and spouse present    GOALS:   Multidisciplinary Problems       Occupational Therapy Goals          Problem: Occupational Therapy    Goal Priority Disciplines Outcome Interventions   Occupational Therapy Goal     OT, PT/OT Progressing    Description: Goals to be met by: 6/22/24     Patient will increase functional independence with ADLs by performing:    LE Dressing with Modified [place].  Grooming while standing with Modified [place].  Toileting from toilet with Modified [place] for hygiene and clothing management.   Supine to sit with Modified [place].  Step transfer with Modified [place]  Toilet transfer to toilet with Modified [place].                         Time Tracking:     OT Date of Treatment: 05/23/24  OT Start Time: 1114  OT Stop Time: 1138  OT Total Time (min): 24 min    Billable Minutes:Self Care/Home Management 12  Therapeutic Activity 12    OT/FAUSTINO: OT     Number of FAUSTINO visits since last OT visit: 0    5/23/2024

## 2024-05-23 NOTE — PLAN OF CARE
Pt clear to D/C home today. Pt spouse to transport Pt home. F/U appts have been requested and scheduled. Pt on [facility] with [facility].  No other D/C needs identified. Pt clear from CM.     Future Appointments   Date Time Provider Department Center   5/27/2024  1:00 PM Leroy Alston MD [facility] FAM MED [place]   5/27/2024  2:30 PM Parag Martinez MD [facility] PAINMGT [place] [facility]   5/30/2024  2:00 PM Michelle Melo PA-C [facility] NEUROSU [facility]   6/28/2024 10:00 AM Michelle Melo PA-C [facility] NEUROSU [place] [facility]   7/19/2024  9:00 AM Leroy Alston MD [facility] IM [place] [facility]   8/19/2024  3:00 PM Max Avendano MD [facility] NEUROSU [facility]         [facility] - Med Surg  Discharge Final Note    Primary Care Provider: Leroy Alston MD    Expected Discharge Date: 5/23/2024    Final Discharge Note (most recent)       Final Note - 05/23/24 1117          Final Note    Assessment Type Discharge Planning Assessment (P)      Anticipated Discharge Disposition Home-Health Care Svc (P)      What phone number can be called within the next 1-3 days to see how you are doing after discharge? [phone] (P)      Hospital Resources/Appts/Education Provided Appointments scheduled and added to AVS (P)         Post-Acute Status    Post-Acute Authorization Home Health (P)      Home Health Status Referrals Sent (P)      Discharge Delays None known at this time (P)                      Important Message from Medicare             Contact Info       Leroy Alston MD   Specialty: Family Medicine   Relationship: PCP - General  Hypertension Digital Medicine Responsible Provider    [address] [place] [address]   Phone: [phone]       Next Steps: Follow up    Instructions: Requested. Please check my chart and or expect a phone call from enrique within 48 hours.    Neurology Follow Up    Requested. Please check my chart and or expect a phone call from scheduling within 48 hours.       Next Steps:  Follow up              Kenya Molina LMSW  Phone: [phone]  [facility]

## 2024-05-23 NOTE — PLAN OF CARE
Problem: Physical Therapy  Goal: Physical Therapy Goal  Description: Goals to be met by: 2024     Patient will increase functional independence with mobility by performin. Supine to sit with Modified [place]  2. Sit to supine with Modified [place]  3. Rolling to Left and Right with Modified [place].  4. Sit to stand transfer with Modified [place]  5. Bed to chair transfer with Modified [place] using Rolling Walker  6. Gait  x 150 feet with Modified [place] using Rolling Walker.     Outcome: Progressing

## 2024-05-23 NOTE — PLAN OF CARE
[facility] - Med Surg    HOME HEALTH ORDERS  FACE TO FACE ENCOUNTER    Patient Name: Alejo Melvin  YOB: 1945    PCP: Leroy Alston MD   PCP Address: [address] [place] [address]  PCP Phone Number: [phone]  PCP Fax: [phone]       Encounter Date: 05/23/2024    Admit to Home Health    Diagnoses:  Active Hospital Problems    Diagnosis  POA    *Spinal stenosis of lumbar region with neurogenic claudication [M48.062]  Yes    Foraminal stenosis of lumbosacral region [M48.07]  Yes    BMI 40.0-44.9, adult [Z68.41]  Not Applicable    MARYLU on CPAP [G47.33]  Yes     Doing very well on CPAP      Hypertension [I10]  Yes     Two gram sodium diet.    Weight loss discussed.    Try to walk 2 miles per day.    Avoid smoking.    Current medications will be:  - Stop amlodipine 5 mg po daily.  Restart if S BP > 140.        Valsartan  40 mg at night  - aspirin (ECOTRIN) 81 MG EC tablet; Take 1 tablet (81 mg total) by mouth 2 (two) times daily.  -     Lasix 40 mg 1 tabs daily  - continue spironolactone 25 mg daily  Hold meds if BP less than 120          Resolved Hospital Problems   No resolved problems to display.       Future Appointments   Date Time Provider Department Center   5/27/2024  1:00 PM Leroy Alston MD [facility] [place]   5/27/2024  2:30 PM Parag Martinez MD [facility]   5/30/2024  2:00 PM Michelle Melo PA-C [facility] NEUROSU [place] [facility]   6/28/2024 10:00 AM Michelle Melo PA-C [facility]OSU [place] [facility]   7/19/2024  9:00 AM Leroy Alston MD [facility]   8/19/2024  3:00 PM Max Avendano MD [facility] NEUROSU [place] [facility]      Follow-up Information       Leroy Alston MD Follow up.    Specialty: Family Medicine  Why: Requested. Please check my chart and or expect a phone call from enrique within 48 hours.  Contact information:  [address] [place] [address]  [phone] i  have seen and examined this patient face to face today. My clinical findings that support the need for the home health skilled services and home bound status are the following:  Weakness/numbness causing balance and gait disturbance due to Weakness/Debility and Surgery making it taxing to leave home.  Requiring assistive device to leave home due to unsteady gait caused by  Weakness/Debility and Surgery.  Medical restrictions requiring assistance of another human to leave home due to  Decreased range of motions in extremities and Post surgery monitoring.    Allergies:Review of patient's allergies indicates:  No Known Allergies    Diet: regular diet    Activities: activity as tolerated    Nursing:   SN to complete comprehensive assessment including routine vital signs. Instruct on disease process and s/s of complications to report to MD. Review/verify medication list sent home with the patient at time of discharge  and instruct patient/caregiver as needed. Frequency may be adjusted depending on start of care date.    Notify MD if SBP > 160 or < 90; DBP > 90 or < 50; HR > 120 or < 50; Temp > 101; Other:         CONSULTS:    Physical Therapy to evaluate and treat. Evaluate for home safety and equipment needs; Establish/upgrade home exercise program. Perform / instruct on therapeutic exercises, gait training, transfer training, and Range of Motion.  Occupational Therapy to evaluate and treat. Evaluate home environment for safety and equipment needs. Perform/Instruct on transfers, ADL training, ROM, and therapeutic exercises.  Aide to provide assistance with personal care, ADLs, and vital signs.          Medications: Review discharge medications with patient and family and provide education.      Current Discharge Medication List        START taking these medications    Details   HYDROcodone-acetaminophen (NORCO)  mg per tablet Take 1 tablet by mouth every 6 (six) hours as needed for Pain.  Qty: 40 tablet, Refills:  0    Comments: Quantity prescribed more than 7 day supply? Yes, quantity medically necessary      methocarbamoL (ROBAXIN) 750 MG Tab Take 1 tablet (750 mg total) by mouth 2 (two) times daily as needed (muscle spasms).  Qty: 40 tablet, Refills: 0           CONTINUE these medications which have NOT CHANGED    Details   abiraterone (ZYTIGA) 250 mg Tab Take 1,000 mg by mouth nightly.      ascorbic acid, vitamin C, (VITAMIN C) 1000 MG tablet Take 2 tablets orally once in the morning.      b complex vitamins capsule Take 1 capsule by mouth once daily.      cholecalciferol, vitamin D3, (VITAMIN D3) 50 mcg (2,000 unit) Cap Take 1 capsule by mouth once daily.      denosumab (PROLIA) 60 mg/mL Syrg       finasteride (PROSCAR) 5 mg tablet Take 5 mg by mouth once daily.      fluticasone propionate (FLONASE) 50 mcg/actuation nasal spray 2 sprays (100 mcg total) by Each Nostril route daily as needed for Rhinitis.  Qty: 16 g, Refills: 5    Associated Diagnoses: Nasal polyp      gabapentin (NEURONTIN) 600 MG tablet Take 1 tablet (600 mg total) by mouth 3 (three) times daily.  Qty: 90 tablet, Refills: 11      leuprolide acetate, 6 month, (ELIGARD) 45 mg injection Inject 45 mg into the skin every 6 (six) months.      montelukast (SINGULAIR) 10 mg tablet TAKE ONE TABLET BY MOUTH EVERY EVENING  Qty: 90 tablet, Refills: 3    Comments: This prescription was filled on 9/29/2023. Any refills authorized will be placed on file.      predniSONE (DELTASONE) 5 MG tablet Take 5 mg by mouth 2 (two) times daily.      rosuvastatin (CRESTOR) 20 MG tablet Take 20 mg by mouth.      spironolactone (ALDACTONE) 25 MG tablet Take 25 mg by mouth once daily.      tamsulosin (FLOMAX) 0.4 mg Cap Take 1 capsule by mouth every other day.      valsartan (DIOVAN) 40 MG tablet Take 1 tablet (40 mg total) by mouth every evening.    Comments: .  Associated Diagnoses: Primary hypertension      venlafaxine (EFFEXOR) 75 MG tablet Take 75 mg by mouth.      zinc 50 mg Tab        aspirin (ECOTRIN) 81 MG EC tablet Take 1 tablet (81 mg total) by mouth 2 (two) times daily.  Qty: 60 tablet, Refills: 0      furosemide (LASIX) 40 MG tablet Take 40 mg by mouth every other day.      mupirocin (BACTROBAN) 2 % ointment Apply topically 2 (two) times daily.  Qty: 22 g, Refills: 3    Associated Diagnoses: Senile purpura           STOP taking these medications       amoxicillin (AMOXIL) 875 MG tablet Comments:   Reason for Stopping:               I certify that this patient is confined to his home and needs intermittent skilled nursing care, physical therapy, and occupational therapy.    Michelle Melo, [facility], PA-C  Neurosurgery  [facility]  05/23/2024

## 2024-05-23 NOTE — PLAN OF CARE
Problem: Adult Inpatient Plan of Care  Goal: Plan of Care Review  5/23/2024 1249 by Josette Corado RN  Outcome: Adequate for Care Transition  5/23/2024 0734 by Josette Corado RN  Outcome: Progressing  Goal: Patient-Specific Goal (Individualized)  Outcome: Adequate for Care Transition  Goal: Absence of Hospital-Acquired Illness or Injury  Outcome: Adequate for Care Transition  Goal: Optimal Comfort and Wellbeing  Outcome: Adequate for Care Transition  Goal: Readiness for Transition of Care  Outcome: Adequate for Care Transition     Problem: Bariatric Environmental Safety  Goal: Safety Maintained with Care  Outcome: Adequate for Care Transition     Problem: Infection  Goal: Absence of Infection Signs and Symptoms  Outcome: Adequate for Care Transition     Problem: Wound  Goal: Optimal Coping  Outcome: Adequate for Care Transition  Goal: Optimal Functional Ability  Outcome: Adequate for Care Transition  Goal: Absence of Infection Signs and Symptoms  Outcome: Adequate for Care Transition  Goal: Improved Oral Intake  Outcome: Adequate for Care Transition  Goal: Optimal Pain Control and Function  Outcome: Adequate for Care Transition  Goal: Skin Health and Integrity  Outcome: Adequate for Care Transition  Goal: Optimal Wound Healing  Outcome: Adequate for Care Transition     Problem: Fall Injury Risk  Goal: Absence of Fall and Fall-Related Injury  Outcome: Adequate for Care Transition     Problem: Skin Injury Risk Increased  Goal: Skin Health and Integrity  Outcome: Adequate for Care Transition     Problem: Comorbidity Management  Goal: Blood Pressure in Desired Range  Outcome: Adequate for Care Transition     Problem: Spinal Surgery  Goal: Optimal Coping with Surgery  Outcome: Adequate for Care Transition  Goal: Absence of Bleeding  Outcome: Adequate for Care Transition  Goal: Effective Bowel Elimination  Outcome: Adequate for Care Transition  Goal: Fluid and Electrolyte Balance  Outcome: Adequate for Care  Transition  Goal: Optimal Functional Ability  Outcome: Adequate for Care Transition  Goal: Absence of Infection Signs and Symptoms  Outcome: Adequate for Care Transition  Goal: Optimal Neurologic Function  Outcome: Adequate for Care Transition  Goal: Anesthesia/Sedation Recovery  Outcome: Adequate for Care Transition  Goal: Optimal Pain Control and Function  Outcome: Adequate for Care Transition  Goal: Nausea and Vomiting Relief  Outcome: Adequate for Care Transition  Goal: Effective Urinary Elimination  Outcome: Adequate for Care Transition  Goal: Effective Oxygenation and Ventilation  Outcome: Adequate for Care Transition     10:20  VIRTUAL NURSE:  Discharge orders noted; additional clinical references attached.  and pharmacy tech notified.  Patient's discharge instruction packet given by bedside RN.    12:35  Cued into patient's room.  Permission received per patient to turn camera to view patient.  Introduced as VN for night shift that will be instructing her on discharge instructions.  Family member at bedside.  Educated patient on reason for admission; medications to hold, continue, and start; care of incision; appointment to follow-up with doctor, and when to return to ED.  Number given for 24/7 Nurse Line.  Education per flowsheet.  Opportunity given for questions and questions answered.  SHU English notified of completion of discharge education; transport notified.

## 2024-05-24 NOTE — DISCHARGE SUMMARY
[facility] Surg  Neurosurgery  Discharge Summary      Patient Name: Alejo Melvin  MRN: [id number]  Admission Date: 5/21/2024  Hospital Length of Stay: 0 days  Discharge Date and Time: 5/23/2024  1:07 PM  Attending Physician: No att. providers found   Discharging Provider: Max Avendano MD  Primary Care Provider: Leroy Alston MD     HPI: This is a very pleasant 78 y.o. male, complaining with worsening bilateral buttock pain radiating down his legs. The pain travels below the knees. Pain is interfering with his ability to walk or stand for long period of time. Patient has developed an antalgic gait. Pain is affecting his quality of life and functional status. Pain is partially relieved by sitting down. He has more right-sided pain than left-sided pain. No new onset of motor weakness numbness or sphincter dysfunction symptoms. He is completed physical therapy. He has a Jehovah Witness. He is taking gabapentin 300 mg 3 times daily. He is also being treated for prostate cancer. He has a pacemaker and can not get an MRI. He is on aspirin 81 mg daily.     Procedure(s) (LRB):  1. Tubular bilateral L4-5 laminectomy, medial facetectomy, foraminotomy and L5-S1 laminectomy, medial facetectomy and right-sided foraminotomy  2. Fluoroscopy    Hospital Course: The surgery was uncomplicated and postoperative course uneventful.  Patient was able to be discharged on post-op day 2 after voiding.      Consults: PT-OT    Significant Diagnostic Studies: N/A    Pending Diagnostic Studies:       None          Final Active Diagnoses:    Diagnosis Date Noted POA    PRINCIPAL PROBLEM:  Spinal stenosis of lumbar region with neurogenic claudication [M48.062] 05/21/2024 Yes    Foraminal stenosis of lumbosacral region [M48.07] 05/21/2024 Yes    BMI 40.0-44.9, adult [Z68.41] 05/21/2024 Not Applicable    MARYLU on CPAP [G47.33] 10/04/2016 Yes    Hypertension [I10]  Yes      Problems Resolved During this Admission:      Discharged  Condition: good    Disposition: Home-Health Care [facility]    Follow Up:   Follow-up Information       [facility] Follow up.    Why: Note: Start of care to be on Saturday 5/25/24.     PT/OT/Home health aide week of 5/26/24  Contact information:  [phone] [address] [place] [address]                         Patient Instructions:   No discharge procedures on file.  Medications:  Reconciled Home Medications:      Medication List        START taking these medications      HYDROcodone-acetaminophen  mg per tablet  Commonly known as: NORCO  Take 1 tablet by mouth every 6 (six) hours as needed for Pain.     methocarbamoL 750 MG Tab  Commonly known as: ROBAXIN  Take 1 tablet (750 mg total) by mouth 2 (two) times daily as needed (muscle spasms).            CHANGE how you take these medications      gabapentin 600 MG tablet  Commonly known as: NEURONTIN  Take 1 tablet (600 mg total) by mouth 3 (three) times daily.  What changed: when to take this            CONTINUE taking these medications      abiraterone 250 mg Tab  Commonly known as: ZYTIGA  Take 1,000 mg by mouth nightly.     ascorbic acid (vitamin C) 1000 MG tablet  Commonly known as: VITAMIN C  Take 2 tablets orally once in the morning.     aspirin 81 MG EC tablet  Commonly known as: ECOTRIN  Take 1 tablet (81 mg total) by mouth 2 (two) times daily.     b complex vitamins capsule  Take 1 capsule by mouth once daily.     cholecalciferol (vitamin D3) 50 mcg (2,000 unit) Cap capsule  Commonly known as: VITAMIN D3  Take 1 capsule by mouth once daily.     finasteride 5 mg tablet  Commonly known as: PROSCAR  Take 5 mg by mouth once daily.     fluticasone propionate 50 mcg/actuation nasal spray  Commonly known as: FLONASE  2 sprays (100 mcg total) by Each Nostril route daily as needed for Rhinitis.     leuprolide acetate (6 month) 45 mg injection  Commonly known as: ELIGARD  Inject 45 mg into the skin every 6 (six) months.      montelukast 10 mg tablet  Commonly known as: SINGULAIR  TAKE ONE TABLET BY MOUTH EVERY EVENING     mupirocin 2 % ointment  Commonly known as: BACTROBAN  Apply topically 2 (two) times daily.     predniSONE 5 MG tablet  Commonly known as: DELTASONE  Take 5 mg by mouth 2 (two) times daily.     rosuvastatin 20 MG tablet  Commonly known as: CRESTOR  Take 20 mg by mouth.     spironolactone 25 MG tablet  Commonly known as: ALDACTONE  Take 25 mg by mouth once daily.     tamsulosin 0.4 mg Cap  Commonly known as: FLOMAX  Take 1 capsule by mouth every other day.     valsartan 40 MG tablet  Commonly known as: DIOVAN  Take 1 tablet (40 mg total) by mouth every evening.     venlafaxine 75 MG tablet  Commonly known as: EFFEXOR  Take 75 mg by mouth.     zinc 50 mg Tab            STOP taking these medications      amoxicillin 875 MG tablet  Commonly known as: AMOXIL            ASK your doctor about these medications      furosemide 40 MG tablet  Commonly known as: LASIX  Take 40 mg by mouth every other day.     PROLIA 60 mg/mL Syrg  Generic drug: denosumab              Max Avendano MD  Neurosurgery  [facility] Surg

## 2024-05-26 ENCOUNTER — NURSE TRIAGE (OUTPATIENT)
Dept: ADMINISTRATIVE | Facility: CLINIC | Age: 79
End: 2024-05-26
Payer: MEDICARE

## 2024-05-26 RX ORDER — ONDANSETRON 4 MG/1
4 TABLET, FILM COATED ORAL EVERY 8 HOURS PRN
Qty: 30 TABLET | Refills: 1 | Status: SHIPPED | OUTPATIENT
Start: 2024-05-26

## 2024-05-26 NOTE — TELEPHONE ENCOUNTER
Pt reports nausea w/ 3 episodes of emesis today. I have reached out to the provider on call. [facility] [address] [facility] per pt request. Pt updated.    Reason for Disposition   [1] Caller has URGENT question AND [2] triager unable to answer question    Additional Information   Negative: Sounds like a life-threatening emergency to the triager   Negative: [1] Widespread rash AND [2] bright red, sunburn-like   Negative: [1] SEVERE headache AND [2] after spinal (epidural) anesthesia   Negative: [1] Vomiting AND [2] persists > 4 hours   Negative: [1] Vomiting AND [2] abdomen looks much more swollen than usual   Negative: [1] Drinking very little AND [2] dehydration suspected (e.g., no urine > 12 hours, very dry mouth, very lightheaded)   Negative: Patient sounds very sick or weak to the triager   Negative: Sounds like a serious complication to the triager   Negative: Fever > 100.4 F (38.0 C)   Negative: [1] SEVERE post-op pain (e.g., excruciating, pain scale 8-10) AND [2] not controlled with pain medications    Protocols used: Post-Op Symptoms and [id number]

## 2024-05-28 ENCOUNTER — TELEPHONE (OUTPATIENT)
Dept: INTERNAL MEDICINE | Facility: CLINIC | Age: 79
End: 2024-05-28
Payer: MEDICARE

## 2024-05-28 NOTE — TELEPHONE ENCOUNTER
----- Message from Giuliano Torre sent at 2024  9:27 AM CDT -----  Contact: Julia @ [facility]  Alejo Melvin  MRN: [id number]  : 1945  PCP: Leroy Alston  Home Phone      [phone]  Work Phone      Not on file.  Mobile          [phone]      MESSAGE: [facility] --- back surgery last week ([facility]) - scheduled to see Dr Harper next week -- no bowel movement for 4 days -- requesting Rx -- [facility] [place]    Call Julia @ [phone]    PCP: Alecia

## 2024-05-29 RX ORDER — BISACODYL 10 MG/1
10 SUPPOSITORY RECTAL DAILY PRN
Qty: 3 SUPPOSITORY | Refills: 0 | Status: SHIPPED | OUTPATIENT
Start: 2024-05-29

## 2024-05-30 ENCOUNTER — OFFICE VISIT (OUTPATIENT)
Dept: NEUROSURGERY | Facility: CLINIC | Age: 79
End: 2024-05-30
Payer: MEDICARE

## 2024-05-30 VITALS — DIASTOLIC BLOOD PRESSURE: 78 MMHG | HEART RATE: 89 BPM | SYSTOLIC BLOOD PRESSURE: 115 MMHG | TEMPERATURE: 99 F

## 2024-05-30 DIAGNOSIS — Z98.890 S/P LUMBAR LAMINECTOMY: Primary | ICD-10-CM

## 2024-05-30 PROCEDURE — 1157F ADVNC CARE PLAN IN RCRD: CPT | Mod: CPTII,S$GLB,, | Performed by: PHYSICIAN ASSISTANT

## 2024-05-30 PROCEDURE — 1125F AMNT PAIN NOTED PAIN PRSNT: CPT | Mod: CPTII,S$GLB,, | Performed by: PHYSICIAN ASSISTANT

## 2024-05-30 PROCEDURE — 1160F RVW MEDS BY RX/DR IN RCRD: CPT | Mod: CPTII,S$GLB,, | Performed by: PHYSICIAN ASSISTANT

## 2024-05-30 PROCEDURE — 1101F PT FALLS ASSESS-DOCD LE1/YR: CPT | Mod: CPTII,S$GLB,, | Performed by: PHYSICIAN ASSISTANT

## 2024-05-30 PROCEDURE — 99024 POSTOP FOLLOW-UP VISIT: CPT | Mod: S$GLB,,, | Performed by: PHYSICIAN ASSISTANT

## 2024-05-30 PROCEDURE — 3078F DIAST BP <80 MM HG: CPT | Mod: CPTII,S$GLB,, | Performed by: PHYSICIAN ASSISTANT

## 2024-05-30 PROCEDURE — 99999 PR PBB SHADOW E&M-EST. PATIENT-LVL V: CPT | Mod: PBBFAC,,, | Performed by: PHYSICIAN ASSISTANT

## 2024-05-30 PROCEDURE — 1159F MED LIST DOCD IN RCRD: CPT | Mod: CPTII,S$GLB,, | Performed by: PHYSICIAN ASSISTANT

## 2024-05-30 PROCEDURE — 3074F SYST BP LT 130 MM HG: CPT | Mod: CPTII,S$GLB,, | Performed by: PHYSICIAN ASSISTANT

## 2024-05-30 PROCEDURE — 3288F FALL RISK ASSESSMENT DOCD: CPT | Mod: CPTII,S$GLB,, | Performed by: PHYSICIAN ASSISTANT

## 2024-05-30 NOTE — PROGRESS NOTES
Postoperative Wound Check:  Date of surgery 05/21/2024     Preop diagnosis   1. L4-5 severe spinal stenosis with radiculopathy and neurogenic claudication  2. Right L5-S1 severe foraminal stenosis with radiculopathy  3. Morbid obesity with BMI of 40.55     Postop diagnosis   Same     Surgery   1. Tubular L4-5 bilateral laminectomy, medial facetectomy, foraminotomy and L5-S1 laminectomy, medial facetectomy and right-sided foraminotomy  2. Fluoroscopy     Surgeon   Max Avendano MD            HPI:    Overall patient is doing well.  Mild back pain.  No leg pain or paresthesias.  Walking with a walker.  Not taking medications.  Patient denies any problems with the incisions.  No redness, swelling, or drainage, and no fever, chills, or sweats.      Physical Exam:    Vitals:    05/30/24 1418   BP: 115/78   Pulse: 89   Temp: 98.5 °F (36.9 °C)   PainSc:   4   PainLoc: Back         Incision:  Wound edges are approximated.  No redness, swelling, or drainage.      Diagnosis:     1. S/P lumbar laminectomy              Plan:          Patient 9 days postop.  We will have the patient come back next week for suture removal at 14 days po.  Continue walking.    Continue keeping incision clean     RICO Hernandez, PA-C  Neurosurgery  [facility]

## 2024-06-03 ENCOUNTER — OFFICE VISIT (OUTPATIENT)
Dept: FAMILY MEDICINE | Facility: CLINIC | Age: 79
End: 2024-06-03
Payer: MEDICARE

## 2024-06-03 VITALS
BODY MASS INDEX: 39.73 KG/M2 | WEIGHT: 293.31 LBS | OXYGEN SATURATION: 97 % | SYSTOLIC BLOOD PRESSURE: 102 MMHG | HEIGHT: 72 IN | DIASTOLIC BLOOD PRESSURE: 70 MMHG | HEART RATE: 77 BPM | RESPIRATION RATE: 14 BRPM

## 2024-06-03 DIAGNOSIS — I10 PRIMARY HYPERTENSION: ICD-10-CM

## 2024-06-03 DIAGNOSIS — E78.2 MIXED HYPERLIPIDEMIA: ICD-10-CM

## 2024-06-03 DIAGNOSIS — M48.062 SPINAL STENOSIS OF LUMBAR REGION WITH NEUROGENIC CLAUDICATION: Primary | ICD-10-CM

## 2024-06-03 DIAGNOSIS — G31.89 OTHER SPECIFIED DEGENERATIVE DISEASES OF NERVOUS SYSTEM: ICD-10-CM

## 2024-06-03 PROCEDURE — 3074F SYST BP LT 130 MM HG: CPT | Mod: CPTII,S$GLB,, | Performed by: FAMILY MEDICINE

## 2024-06-03 PROCEDURE — 99999 PR PBB SHADOW E&M-EST. PATIENT-LVL V: CPT | Mod: PBBFAC,,, | Performed by: FAMILY MEDICINE

## 2024-06-03 PROCEDURE — 1157F ADVNC CARE PLAN IN RCRD: CPT | Mod: CPTII,S$GLB,, | Performed by: FAMILY MEDICINE

## 2024-06-03 PROCEDURE — 1159F MED LIST DOCD IN RCRD: CPT | Mod: CPTII,S$GLB,, | Performed by: FAMILY MEDICINE

## 2024-06-03 PROCEDURE — 99214 OFFICE O/P EST MOD 30 MIN: CPT | Mod: S$GLB,,, | Performed by: FAMILY MEDICINE

## 2024-06-03 PROCEDURE — 3078F DIAST BP <80 MM HG: CPT | Mod: CPTII,S$GLB,, | Performed by: FAMILY MEDICINE

## 2024-06-03 PROCEDURE — 1101F PT FALLS ASSESS-DOCD LE1/YR: CPT | Mod: CPTII,S$GLB,, | Performed by: FAMILY MEDICINE

## 2024-06-03 PROCEDURE — 1160F RVW MEDS BY RX/DR IN RCRD: CPT | Mod: CPTII,S$GLB,, | Performed by: FAMILY MEDICINE

## 2024-06-03 PROCEDURE — 3288F FALL RISK ASSESSMENT DOCD: CPT | Mod: CPTII,S$GLB,, | Performed by: FAMILY MEDICINE

## 2024-06-03 PROCEDURE — 1125F AMNT PAIN NOTED PAIN PRSNT: CPT | Mod: CPTII,S$GLB,, | Performed by: FAMILY MEDICINE

## 2024-06-03 NOTE — PROGRESS NOTES
Subjective:       Patient ID: Alejo Melvin is a 79 y.o. male.    Chief Complaint: Hospital Follow Up (Patient is here today for a follow up visit from his back surgery. )    Patient here for follow up for lumbar surgery.  He is recovering nicley.  He has home health and home physical therapy.  He was using his walker.  BP has been more stable.  He is on the digital medicine program.      Review of Systems   Constitutional:  Negative for unexpected weight change.   Respiratory:  Negative for cough, chest tightness and shortness of breath.    Cardiovascular:  Negative for chest pain and leg swelling.   Gastrointestinal:  Negative for abdominal pain.   Genitourinary:  Negative for difficulty urinating.   Musculoskeletal:  Positive for arthralgias and back pain. Negative for joint swelling.   Skin:  Negative for rash.   Neurological:  Negative for numbness.   Hematological:  Negative for adenopathy.       Objective:      Vitals:    06/03/24 0845   BP: 102/70   Pulse: 77   Resp: 14     Physical Exam  Constitutional:       Appearance: Normal appearance. He is well-developed. He is obese.   Cardiovascular:      Rate and Rhythm: Normal rate and regular rhythm.      Heart sounds: Normal heart sounds. No murmur heard.     No gallop.   Pulmonary:      Effort: Pulmonary effort is normal.      Breath sounds: Normal breath sounds.   Musculoskeletal:         General: Tenderness present.      Thoracic back: No scoliosis.      Lumbar back: Tenderness and bony tenderness present. Normal range of motion. Positive left straight leg raise test. Negative right straight leg raise test. No scoliosis.      Right hip: No tenderness. Normal range of motion.      Left hip: No tenderness. Normal range of motion.      Right lower leg: No edema.      Left lower leg: No edema.   Neurological:      General: No focal deficit present.      Mental Status: He is alert and oriented to person, place, and time.      Deep Tendon Reflexes: Reflexes  are normal and symmetric.         BMP  Lab Results   Component Value Date     (L) 05/22/2024    K 4.6 05/22/2024     05/22/2024    CO2 25 05/22/2024    BUN 24 (H) 05/22/2024    CREATININE 0.8 05/22/2024    CALCIUM 8.2 (L) 05/22/2024    ANIONGAP 5 (L) 05/22/2024    EGFRNORACEVR >60 05/22/2024     Lab Results   Component Value Date    WBC 8.68 05/22/2024    HGB 10.9 (L) 05/22/2024    HCT 33.0 (L) 05/22/2024    MCV 99 (H) 05/22/2024     05/22/2024       Lab Results   Component Value Date    LDLCALC 30.2 (L) 12/08/2023       Assessment:       1. Spinal stenosis of lumbar region with neurogenic claudication    2. Primary hypertension    3. Other specified degenerative diseases of nervous system    4. Mixed hyperlipidemia                Plan:   1. Spinal stenosis of lumbar region with neurogenic claudication  Overview:  S/P lumbar surgery-laminectomy, fasciotomy.  Doing very well.      2. Primary hypertension  Overview:  Two gram sodium diet.    Weight loss discussed.    Try to walk 2 miles per day.    Avoid smoking.    Current medications will be:  - Stop amlodipine 5 mg po daily.  Restart if S BP > 140.        Valsartan  40 mg at night  - aspirin (ECOTRIN) 81 MG EC tablet; Take 1 tablet (81 mg total) by mouth 2 (two) times daily.  -     Lasix 40 mg 1 tabs daily  - continue spironolactone 25 mg daily  Hold meds if BP less than 120        3. Other specified degenerative diseases of nervous system  Assessment & Plan:  S/P lumbar fusion.      4. Mixed hyperlipidemia  Overview:  Low fat diet.  Avoid sweets.  A 10 pound weight loss by the next visit as a  good goal.  Increase consumption of fruits and vegetables, fish and chicken.  Use medications below:        fish oil-omega-3 fatty acids 300-1,000 mg capsule; Take 2 capsules (2 g total) by mouth once daily.  - Rosuvastatin 40 mg by mouth daily  -     Zetia 10 mg daily            RTC in 3 months

## 2024-06-04 ENCOUNTER — OFFICE VISIT (OUTPATIENT)
Dept: NEUROSURGERY | Facility: CLINIC | Age: 79
End: 2024-06-04
Payer: MEDICARE

## 2024-06-04 VITALS
WEIGHT: 293.19 LBS | HEIGHT: 72 IN | HEART RATE: 85 BPM | SYSTOLIC BLOOD PRESSURE: 90 MMHG | DIASTOLIC BLOOD PRESSURE: 56 MMHG | BODY MASS INDEX: 39.71 KG/M2

## 2024-06-04 DIAGNOSIS — Z98.890 S/P LUMBAR LAMINECTOMY: Primary | ICD-10-CM

## 2024-06-04 PROCEDURE — 3074F SYST BP LT 130 MM HG: CPT | Mod: CPTII,S$GLB,, | Performed by: PHYSICIAN ASSISTANT

## 2024-06-04 PROCEDURE — 1160F RVW MEDS BY RX/DR IN RCRD: CPT | Mod: CPTII,S$GLB,, | Performed by: PHYSICIAN ASSISTANT

## 2024-06-04 PROCEDURE — 99999 PR PBB SHADOW E&M-EST. PATIENT-LVL IV: CPT | Mod: PBBFAC,,, | Performed by: PHYSICIAN ASSISTANT

## 2024-06-04 PROCEDURE — 3078F DIAST BP <80 MM HG: CPT | Mod: CPTII,S$GLB,, | Performed by: PHYSICIAN ASSISTANT

## 2024-06-04 PROCEDURE — 1101F PT FALLS ASSESS-DOCD LE1/YR: CPT | Mod: CPTII,S$GLB,, | Performed by: PHYSICIAN ASSISTANT

## 2024-06-04 PROCEDURE — 3288F FALL RISK ASSESSMENT DOCD: CPT | Mod: CPTII,S$GLB,, | Performed by: PHYSICIAN ASSISTANT

## 2024-06-04 PROCEDURE — 1125F AMNT PAIN NOTED PAIN PRSNT: CPT | Mod: CPTII,S$GLB,, | Performed by: PHYSICIAN ASSISTANT

## 2024-06-04 PROCEDURE — 1159F MED LIST DOCD IN RCRD: CPT | Mod: CPTII,S$GLB,, | Performed by: PHYSICIAN ASSISTANT

## 2024-06-04 PROCEDURE — 1157F ADVNC CARE PLAN IN RCRD: CPT | Mod: CPTII,S$GLB,, | Performed by: PHYSICIAN ASSISTANT

## 2024-06-04 PROCEDURE — 99024 POSTOP FOLLOW-UP VISIT: CPT | Mod: S$GLB,,, | Performed by: PHYSICIAN ASSISTANT

## 2024-06-04 NOTE — PROGRESS NOTES
Postoperative Wound Check:  Date of surgery 05/21/2024     Preop diagnosis   1. L4-5 severe spinal stenosis with radiculopathy and neurogenic claudication  2. Right L5-S1 severe foraminal stenosis with radiculopathy  3. Morbid obesity with BMI of 40.55     Postop diagnosis   Same     Surgery   1. Tubular L4-5 bilateral laminectomy, medial facetectomy, foraminotomy and L5-S1 laminectomy, medial facetectomy and right-sided foraminotomy  2. Fluoroscopy     Surgeon   Max Avendano MD            HPI:    Back pain.  No leg pain.     Patient denies any problems with the incisions.  No redness, swelling, or drainage, and no fever, chills, or sweats.      Physical Exam:    Vitals:    06/04/24 1313   BP: (!) 90/56   Pulse: 85   Weight: 133 kg (293 lb 3.4 oz)   Height: 6' (1.829 m)   PainSc:   3   PainLoc: Back         Incision:  Wound edges are approximated.  No redness, swelling, or drainage.      Diagnosis:     1. S/P lumbar laminectomy              Plan:          I removed sutures and cleaned with choloprep.  Leave open to air.  Don't submerge  FU in 1 month for 6 week po fu  Ok to start outpatient PT at 6 weeks po    Michelle Melo [facility], PA-C  Neurosurgery  [facility]

## 2024-06-28 ENCOUNTER — OFFICE VISIT (OUTPATIENT)
Dept: NEUROSURGERY | Facility: CLINIC | Age: 79
End: 2024-06-28
Payer: MEDICARE

## 2024-06-28 VITALS
DIASTOLIC BLOOD PRESSURE: 76 MMHG | HEART RATE: 87 BPM | HEIGHT: 72 IN | WEIGHT: 293.19 LBS | BODY MASS INDEX: 39.71 KG/M2 | SYSTOLIC BLOOD PRESSURE: 120 MMHG

## 2024-06-28 DIAGNOSIS — Z98.890 S/P LUMBAR LAMINECTOMY: Primary | ICD-10-CM

## 2024-06-28 PROCEDURE — 99999 PR PBB SHADOW E&M-EST. PATIENT-LVL V: CPT | Mod: PBBFAC,,, | Performed by: PHYSICIAN ASSISTANT

## 2024-06-28 NOTE — PROGRESS NOTES
Subjective:     Patient ID:  Alejo Melvin is a 79 y.o. male.    Juan Alberto    Chief Complaint: 6 week po fu        HPI    Alejo Melvin is a 79 y.o. male who presents for follow up.    Overall patient is doing well.  Minimal low back pain.  No leg pain or paresthesias.  Walking with a walker.  He is doing well with home health physical therapy and occupational therapy.  He is taking gabapentin 600 mg 3 times a day.    Patient denies any recent accidents or trauma, no saddle anesthesias, and no bowel or bladder incontinence.      Review of Systems:  Constitution: Negative for chills, fever, night sweats and weight loss.   Musculoskeletal: Negative for falls.   Gastrointestinal: Negative for bowel incontinence, nausea and vomiting.   Genitourinary: Negative for bladder incontinence.   Neurological: Negative for disturbances in coordination and loss of balance.      Objective:      Vitals:    06/28/24 0941   BP: 120/76   Pulse: 87   Weight: 133 kg (293 lb 3.4 oz)   Height: 6' (1.829 m)   PainSc:   3   PainLoc: Back         Physical Exam:      General:  Alejo Melvin is well-developed, well-nourished, appears stated age, in no acute distress, alert and oriented to person, place, and time.    Pulmonary/Chest:  Respiratory effort normal  Abdominal: Exhibits no distension  Psychiatric:  Normal mood and affect.  Behavior is normal.  Judgement and thought content normal      Musculoskeletal:    Lumbar Spine Inspection:  Scab on lower portion of the incision.  No redness, swelling or drainage.    Lumbar Spine Palpation:  No tenderness to low back palpation.    SI Joint Palpation:  No tenderness to SI Joint palpation.        Neurological:  Alert and oriented to person, place, and time    Muscle strength against resistance:     Right Left   Hip flexion  5 / 5 5 / 5   Hip extension 5 / 5 5 / 5   Hip abduction 5 / 5 5 / 5   Hip adduction  5 / 5 5 / 5   Knee extension  5 / 5 5 / 5   Knee flexion 5 / 5 5 / 5    Dorsiflexion  5 / 5 5 / 5   EHL  5 / 5 5 / 5   Plantar flexion  5 / 5 5 / 5   Inversion of the feet 5 / 5 5 / 5   Eversion of the feet  5 / 5 5 / 5       On gross examination of the bilateral upper extremities, patient has full painfree ROM with no signs of clubbing, cyanosis, edema, or weakness.     .      Assessment:          1. S/P lumbar laminectomy            Plan:          Orders Placed This Encounter    Ambulatory referral/consult to Physical/Occupational Therapy       - Start PT outside of [facility]  -Take gabapentin 600mg BID for one week then 600mg daily for one week then stop  -Ok to drive  -FU with Dr. Avendano in 6 weeks for 3 month po fu    Follow-Up:  Follow up in about 6 weeks (around 8/9/2024). If there are any questions prior to this, the patient was instructed to contact the office.       Michelle Melo [facility], PA-C  Neurosurgery  [facility]  06/28/2024

## 2024-07-03 ENCOUNTER — TELEPHONE (OUTPATIENT)
Dept: INTERNAL MEDICINE | Facility: CLINIC | Age: 79
End: 2024-07-03
Payer: MEDICARE

## 2024-07-03 ENCOUNTER — EXTERNAL HOME HEALTH (OUTPATIENT)
Dept: HOME HEALTH SERVICES | Facility: HOSPITAL | Age: 79
End: 2024-07-03
Payer: MEDICARE

## 2024-07-03 DIAGNOSIS — G47.33 OSA ON CPAP: Primary | ICD-10-CM

## 2024-07-03 NOTE — TELEPHONE ENCOUNTER
----- Message from Giuliano Torre sent at 7/3/2024 11:58 AM CDT -----  Contact: Fernando @ [facility]  Alejo Melvin  MRN: [id number]  : 1945  PCP: Leroy Alston  Home Phone      [phone]  Work Phone      Not on file.  Mobile          [phone]      MESSAGE: [facility] -- would like status of request for C-pap supplies -- fax # [phone]    Call [phone] -- ref # [id number]    PCP: Alecia

## 2024-07-09 ENCOUNTER — TELEPHONE (OUTPATIENT)
Dept: FAMILY MEDICINE | Facility: CLINIC | Age: 79
End: 2024-07-09
Payer: MEDICARE

## 2024-07-09 NOTE — TELEPHONE ENCOUNTER
----- Message from Xander Davenport sent at 2024 12:57 PM CDT -----  Contact: Art  Alejo Melvin  MRN: [id number]  : 1945  PCP: Leroy Alston  Home Phone      [phone]  Work Phone      Not on file.  Mobile          [phone]      MESSAGE:     Art with [facility] called wanting to know status of orders faxed over to us for CPAP supplies. Fax # [phone]        Please advise  Art  [phone]  Reference #  [id number]

## 2024-07-09 NOTE — TELEPHONE ENCOUNTER
Faxed cpap to [facility]. Also called [facility] to inform them it was faxed.   Dme stated they need new orders and will fax over the correct orders.

## 2024-07-29 ENCOUNTER — OFFICE VISIT (OUTPATIENT)
Dept: FAMILY MEDICINE | Facility: CLINIC | Age: 79
End: 2024-07-29
Payer: MEDICARE

## 2024-07-29 VITALS
RESPIRATION RATE: 16 BRPM | SYSTOLIC BLOOD PRESSURE: 104 MMHG | BODY MASS INDEX: 39.64 KG/M2 | WEIGHT: 292.69 LBS | OXYGEN SATURATION: 95 % | HEIGHT: 72 IN | HEART RATE: 61 BPM | DIASTOLIC BLOOD PRESSURE: 70 MMHG

## 2024-07-29 DIAGNOSIS — E78.2 MIXED HYPERLIPIDEMIA: ICD-10-CM

## 2024-07-29 DIAGNOSIS — I95.2 HYPOTENSION DUE TO DRUGS: ICD-10-CM

## 2024-07-29 DIAGNOSIS — Z98.890 S/P LUMBAR LAMINECTOMY: Primary | ICD-10-CM

## 2024-07-29 DIAGNOSIS — I10 PRIMARY HYPERTENSION: ICD-10-CM

## 2024-07-29 PROCEDURE — 99214 OFFICE O/P EST MOD 30 MIN: CPT | Mod: S$GLB,,, | Performed by: FAMILY MEDICINE

## 2024-07-29 PROCEDURE — 1101F PT FALLS ASSESS-DOCD LE1/YR: CPT | Mod: CPTII,S$GLB,, | Performed by: FAMILY MEDICINE

## 2024-07-29 PROCEDURE — 1160F RVW MEDS BY RX/DR IN RCRD: CPT | Mod: CPTII,S$GLB,, | Performed by: FAMILY MEDICINE

## 2024-07-29 PROCEDURE — 1125F AMNT PAIN NOTED PAIN PRSNT: CPT | Mod: CPTII,S$GLB,, | Performed by: FAMILY MEDICINE

## 2024-07-29 PROCEDURE — 1159F MED LIST DOCD IN RCRD: CPT | Mod: CPTII,S$GLB,, | Performed by: FAMILY MEDICINE

## 2024-07-29 PROCEDURE — 3288F FALL RISK ASSESSMENT DOCD: CPT | Mod: CPTII,S$GLB,, | Performed by: FAMILY MEDICINE

## 2024-07-29 PROCEDURE — 99999 PR PBB SHADOW E&M-EST. PATIENT-LVL V: CPT | Mod: PBBFAC,,, | Performed by: FAMILY MEDICINE

## 2024-07-29 PROCEDURE — 3074F SYST BP LT 130 MM HG: CPT | Mod: CPTII,S$GLB,, | Performed by: FAMILY MEDICINE

## 2024-07-29 PROCEDURE — 3078F DIAST BP <80 MM HG: CPT | Mod: CPTII,S$GLB,, | Performed by: FAMILY MEDICINE

## 2024-07-29 PROCEDURE — 1157F ADVNC CARE PLAN IN RCRD: CPT | Mod: CPTII,S$GLB,, | Performed by: FAMILY MEDICINE

## 2024-07-29 NOTE — PROGRESS NOTES
Subjective:       Patient ID: Alejo Melvin is a 79 y.o. male.    Chief Complaint: Dizziness (Pt is here to with some light headedness )    Patient underwent lumbar surgery and he is doing much better.  He is being treated by Dr Avendano.  He had back surgery and is doing much better.  BP has been more stable.  He is on the digital medicine program.  Patient is becomes very lightheaded when he stands up.  Cardiology still has a 1 valsartan, spironolactone.  They think he needs to stay on it despite his mild orthopnea.  He was not passed out.        Review of Systems   Constitutional:  Positive for activity change. Negative for unexpected weight change.   HENT:  Positive for hearing loss. Negative for rhinorrhea and trouble swallowing.    Eyes:  Negative for discharge and visual disturbance.   Respiratory:  Negative for cough, chest tightness, shortness of breath and wheezing.    Cardiovascular:  Negative for chest pain, palpitations and leg swelling.   Gastrointestinal:  Negative for abdominal pain, blood in stool, constipation, diarrhea and vomiting.   Endocrine: Negative for polydipsia and polyuria.   Genitourinary:  Positive for urgency. Negative for difficulty urinating and hematuria.   Musculoskeletal:  Positive for arthralgias and back pain. Negative for joint swelling and neck pain.   Skin:  Negative for rash.   Neurological:  Positive for dizziness and weakness. Negative for numbness and headaches.   Hematological:  Negative for adenopathy.   Psychiatric/Behavioral:  Negative for confusion and dysphoric mood.        Objective:      Vitals:    07/29/24 1359   BP: 104/70   Pulse: 61   Resp: 16     Physical Exam  Constitutional:       Appearance: Normal appearance. He is well-developed. He is obese.   Cardiovascular:      Rate and Rhythm: Normal rate and regular rhythm.      Heart sounds: Normal heart sounds. No murmur heard.     No gallop.   Pulmonary:      Effort: Pulmonary effort is normal.      Breath  sounds: Normal breath sounds.   Musculoskeletal:         General: Tenderness present.      Thoracic back: No scoliosis.      Lumbar back: Tenderness and bony tenderness present. Normal range of motion. Positive left straight leg raise test. Negative right straight leg raise test. No scoliosis.      Right hip: No tenderness. Normal range of motion.      Left hip: No tenderness. Normal range of motion.      Right lower leg: No edema.      Left lower leg: No edema.   Neurological:      General: No focal deficit present.      Mental Status: He is alert and oriented to person, place, and time.      Deep Tendon Reflexes: Reflexes are normal and symmetric.         BMP  Lab Results   Component Value Date     (L) 05/22/2024    K 4.6 05/22/2024     05/22/2024    CO2 25 05/22/2024    BUN 24 (H) 05/22/2024    CREATININE 0.8 05/22/2024    CALCIUM 8.2 (L) 05/22/2024    ANIONGAP 5 (L) 05/22/2024    EGFRNORACEVR >60 05/22/2024     Lab Results   Component Value Date    WBC 8.68 05/22/2024    HGB 10.9 (L) 05/22/2024    HCT 33.0 (L) 05/22/2024    MCV 99 (H) 05/22/2024     05/22/2024       Lab Results   Component Value Date    LDLCALC 30.2 (L) 12/08/2023       Assessment:       1. S/P lumbar laminectomy    2. Mixed hyperlipidemia    3. Primary hypertension    4. Hypotension due to drugs              Plan:   1. S/P lumbar laminectomy  Overview:  Patient's pain is significantly better.  Continue therapy.  Recommend water aerobics.      2. Mixed hyperlipidemia  Overview:  Low fat diet.  Avoid sweets.  A 10 pound weight loss by the next visit as a  good goal.  Increase consumption of fruits and vegetables, fish and chicken.  Use medications below:        fish oil-omega-3 fatty acids 300-1,000 mg capsule; Take 2 capsules (2 g total) by mouth once daily.  - Rosuvastatin 40 mg by mouth daily  -     Zetia 10 mg daily        3. Primary hypertension  Overview:  Two gram sodium diet.    Weight loss discussed.    Try to walk 2  miles per day.    Avoid smoking.    Current medications will be:  - Stop amlodipine 5 mg po daily.  Restart if S BP > 140.        Valsartan  40 mg at night  - aspirin (ECOTRIN) 81 MG EC tablet; Take 1 tablet (81 mg total) by mouth 2 (two) times daily.  -     Lasix 40 mg 1 tabs daily  - continue spironolactone 25 mg daily  Hold meds if BP less than 120        4. Hypotension due to drugs  Overview:  Continue support stockings  Monitor blood pressure daily  Decrease Valsartan to 40 mg at night          RTC in 3 months

## 2024-08-05 ENCOUNTER — PATIENT OUTREACH (OUTPATIENT)
Dept: ADMINISTRATIVE | Facility: HOSPITAL | Age: 79
End: 2024-08-05
Payer: MEDICARE

## 2024-08-19 ENCOUNTER — OFFICE VISIT (OUTPATIENT)
Dept: NEUROSURGERY | Facility: CLINIC | Age: 79
End: 2024-08-19
Payer: MEDICARE

## 2024-08-19 ENCOUNTER — TELEPHONE (OUTPATIENT)
Dept: NEUROSURGERY | Facility: CLINIC | Age: 79
End: 2024-08-19

## 2024-08-19 VITALS
DIASTOLIC BLOOD PRESSURE: 81 MMHG | SYSTOLIC BLOOD PRESSURE: 135 MMHG | HEART RATE: 92 BPM | WEIGHT: 292.56 LBS | BODY MASS INDEX: 39.62 KG/M2 | HEIGHT: 72 IN

## 2024-08-19 DIAGNOSIS — M48.062 SPINAL STENOSIS OF LUMBAR REGION WITH NEUROGENIC CLAUDICATION: Primary | ICD-10-CM

## 2024-08-19 DIAGNOSIS — Z98.890 STATUS POST LUMBAR LAMINECTOMY: Primary | ICD-10-CM

## 2024-08-19 PROCEDURE — 99024 POSTOP FOLLOW-UP VISIT: CPT | Mod: S$GLB,,, | Performed by: NEUROLOGICAL SURGERY

## 2024-08-19 PROCEDURE — 1159F MED LIST DOCD IN RCRD: CPT | Mod: CPTII,S$GLB,, | Performed by: NEUROLOGICAL SURGERY

## 2024-08-19 PROCEDURE — 1157F ADVNC CARE PLAN IN RCRD: CPT | Mod: CPTII,S$GLB,, | Performed by: NEUROLOGICAL SURGERY

## 2024-08-19 PROCEDURE — 3079F DIAST BP 80-89 MM HG: CPT | Mod: CPTII,S$GLB,, | Performed by: NEUROLOGICAL SURGERY

## 2024-08-19 PROCEDURE — 1101F PT FALLS ASSESS-DOCD LE1/YR: CPT | Mod: CPTII,S$GLB,, | Performed by: NEUROLOGICAL SURGERY

## 2024-08-19 PROCEDURE — 3075F SYST BP GE 130 - 139MM HG: CPT | Mod: CPTII,S$GLB,, | Performed by: NEUROLOGICAL SURGERY

## 2024-08-19 PROCEDURE — 1125F AMNT PAIN NOTED PAIN PRSNT: CPT | Mod: CPTII,S$GLB,, | Performed by: NEUROLOGICAL SURGERY

## 2024-08-19 PROCEDURE — 3288F FALL RISK ASSESSMENT DOCD: CPT | Mod: CPTII,S$GLB,, | Performed by: NEUROLOGICAL SURGERY

## 2024-08-19 PROCEDURE — 99999 PR PBB SHADOW E&M-EST. PATIENT-LVL IV: CPT | Mod: PBBFAC,,, | Performed by: NEUROLOGICAL SURGERY

## 2024-08-19 NOTE — PROGRESS NOTES
NEUROSURGICAL POST-OPERATIVE PROGRESS NOTE    DATE OF SERVICE:  08/19/2024      ATTENDING PHYSICIAN:  Max Avendano MD    SUBJECTIVE:    INTERIM HISTORY:    This is a very pleasant 79 y.o. y.o. male, who is less than 3 months status post L4-5 and L5-S1 laminectomy, medial facetectomy, foraminotomy.  Patient is doing well.  Reports complete relief of his leg pain since surgery.  He has some low back pain that is improving.  He has recovered from COVID after surgery.  He is doing outpatient physical therapy.  Overall he is very satisfied with his outcome.  He is walking without any assistance at this time.              OBJECTIVE:    PHYSICAL EXAMINATION:   Vitals:    08/19/24 1516   BP: 135/81   Pulse: 92       Physical Exam:  Vitals reviewed.    Constitutional: He appears well-developed and well-nourished.     Eyes: Pupils are equal, round, and reactive to light. Conjunctivae and EOM are normal.     Cardiovascular: Normal distal pulses and no edema.     Abdominal: Soft.     Skin: Skin displays no rash on trunk and no rash on extremities. Skin displays no lesions on trunk and no lesions on extremities.     Psych/Behavior: He is alert. He is oriented to person, place, and time. He has a normal mood and affect.     Musculoskeletal:        Neck: Range of motion is full.       Ortho Exam    Neurologic Exam     Mental Status   Oriented to person, place, and time.     Cranial Nerves     CN III, IV, VI   Pupils are equal, round, and reactive to light.  Extraocular motions are normal.     Motor Exam     Strength   Strength 5/5 throughout.       Wound has healed.    DIAGNOSTIC DATA:    NA    ASSESSMENT:    This is a 79 y.o. male who is s/p L4-5 and L5-S1 minimally invasive laminectomy, medial facetectomy, foraminotomy with significant improvement in neurogenic claudication and radiculopathy symptoms    Problem List Items Addressed This Visit    None  Visit Diagnoses       Status post lumbar laminectomy    -  Primary             PLAN:    Continue physical therapy, weight loss  We will follow up as needed if develops recurrent back or leg pain        Max Avendano MD  Pager: [phone]

## 2024-09-23 ENCOUNTER — TELEPHONE (OUTPATIENT)
Dept: FAMILY MEDICINE | Facility: CLINIC | Age: 79
End: 2024-09-23
Payer: MEDICARE

## 2024-09-23 NOTE — TELEPHONE ENCOUNTER
----- Message from Giuliano Torre sent at 2024  3:09 PM CDT -----  Contact: Patient  Alejo Melvin  MRN: [id number]  : 1945  PCP: Leroy Alston  Home Phone      [phone]  Work Phone      Not on file.  Mobile          [phone]      MESSAGE: requesting f/u appt for kidney stone with Dr Harper    Call [phone]    PCP: Alecia

## 2024-09-23 NOTE — TELEPHONE ENCOUNTER
----- Message from Aye Armstrong sent at 2024  9:23 AM CDT -----  Contact: self  Alejo Melvin  MRN: [id number]  : 1945  PCP: Leroy Alston  Home Phone      [phone]  Work Phone      Not on file.  Mobile          [phone]      MESSAGE:   Patient is experiencing pain in his L side', would like to be seen today    Please advise      [phone]

## 2024-09-27 ENCOUNTER — OFFICE VISIT (OUTPATIENT)
Dept: INTERNAL MEDICINE | Facility: CLINIC | Age: 79
End: 2024-09-27
Payer: MEDICARE

## 2024-09-27 VITALS
DIASTOLIC BLOOD PRESSURE: 80 MMHG | RESPIRATION RATE: 16 BRPM | WEIGHT: 289.44 LBS | SYSTOLIC BLOOD PRESSURE: 100 MMHG | OXYGEN SATURATION: 93 % | HEIGHT: 72 IN | HEART RATE: 71 BPM | BODY MASS INDEX: 39.2 KG/M2

## 2024-09-27 DIAGNOSIS — K59.03 DRUG-INDUCED CONSTIPATION: Primary | ICD-10-CM

## 2024-09-27 DIAGNOSIS — N20.0 KIDNEY STONE: ICD-10-CM

## 2024-09-27 PROCEDURE — 1160F RVW MEDS BY RX/DR IN RCRD: CPT | Mod: CPTII,S$GLB,, | Performed by: FAMILY MEDICINE

## 2024-09-27 PROCEDURE — 3079F DIAST BP 80-89 MM HG: CPT | Mod: CPTII,S$GLB,, | Performed by: FAMILY MEDICINE

## 2024-09-27 PROCEDURE — 1159F MED LIST DOCD IN RCRD: CPT | Mod: CPTII,S$GLB,, | Performed by: FAMILY MEDICINE

## 2024-09-27 PROCEDURE — 3074F SYST BP LT 130 MM HG: CPT | Mod: CPTII,S$GLB,, | Performed by: FAMILY MEDICINE

## 2024-09-27 PROCEDURE — 99214 OFFICE O/P EST MOD 30 MIN: CPT | Mod: S$GLB,,, | Performed by: FAMILY MEDICINE

## 2024-09-27 PROCEDURE — 1125F AMNT PAIN NOTED PAIN PRSNT: CPT | Mod: CPTII,S$GLB,, | Performed by: FAMILY MEDICINE

## 2024-09-27 PROCEDURE — 1101F PT FALLS ASSESS-DOCD LE1/YR: CPT | Mod: CPTII,S$GLB,, | Performed by: FAMILY MEDICINE

## 2024-09-27 PROCEDURE — 1157F ADVNC CARE PLAN IN RCRD: CPT | Mod: CPTII,S$GLB,, | Performed by: FAMILY MEDICINE

## 2024-09-27 PROCEDURE — 99999 PR PBB SHADOW E&M-EST. PATIENT-LVL V: CPT | Mod: PBBFAC,,, | Performed by: FAMILY MEDICINE

## 2024-09-27 PROCEDURE — 3288F FALL RISK ASSESSMENT DOCD: CPT | Mod: CPTII,S$GLB,, | Performed by: FAMILY MEDICINE

## 2024-09-27 RX ORDER — SYRING-NEEDL,DISP,INSUL,0.3 ML 29 G X1/2"
148 SYRINGE, EMPTY DISPOSABLE MISCELLANEOUS ONCE
Qty: 296 ML | Refills: 0 | Status: SHIPPED | OUTPATIENT
Start: 2024-09-27 | End: 2024-09-27

## 2024-09-27 RX ORDER — POLYETHYLENE GLYCOL 3350 17 G/17G
17 POWDER, FOR SOLUTION ORAL 3 TIMES DAILY PRN
Qty: 30 PACKET | Refills: 11 | Status: SHIPPED | OUTPATIENT
Start: 2024-09-27

## 2024-09-27 RX ORDER — TAMSULOSIN HYDROCHLORIDE 0.4 MG/1
1 CAPSULE ORAL EVERY OTHER DAY
Qty: 30 CAPSULE | Refills: 2 | Status: SHIPPED | OUTPATIENT
Start: 2024-09-27

## 2024-09-27 NOTE — PROGRESS NOTES
Subjective:       Patient ID: Alejo Melvin is a 79 y.o. male.    Chief Complaint: Flank Pain (Pt is here today for an er follow up for kidney stones ) and Follow-up    Patient went to the emergency room with Urology code on his needed he is extremely constipated not sure what next.  If the stone does not has he will probably need stenting and lithotripsy.      Review of Systems   Constitutional:  Negative for activity change, chills, fatigue, fever and unexpected weight change.   HENT:  Negative for sore throat and trouble swallowing.    Respiratory:  Negative for cough, chest tightness and shortness of breath.    Cardiovascular:  Negative for chest pain and leg swelling.   Gastrointestinal:  Positive for abdominal distention and constipation. Negative for abdominal pain.   Endocrine: Negative for cold intolerance and heat intolerance.   Genitourinary:  Negative for difficulty urinating.   Musculoskeletal:  Positive for back pain. Negative for joint swelling.   Skin:  Negative for rash.   Neurological:  Negative for numbness.   Hematological:  Negative for adenopathy.   Psychiatric/Behavioral:  Negative for decreased concentration.        Objective:      Vitals:    09/27/24 0929   BP: 100/80   Pulse: 71   Resp: 16     Physical Exam  Constitutional:       Appearance: He is well-developed.   Cardiovascular:      Rate and Rhythm: Normal rate and regular rhythm.      Heart sounds: Normal heart sounds. No murmur heard.  Pulmonary:      Effort: Pulmonary effort is normal. No respiratory distress.      Breath sounds: Normal breath sounds.   Abdominal:      General: Bowel sounds are normal. There is distension.      Palpations: Abdomen is soft.      Tenderness: There is no abdominal tenderness. There is no guarding.      Comments: Decreased bowel sounds         Assessment:       1. Drug-induced constipation    2. Kidney stone        Plan:   1. Drug-induced constipation  -     polyethylene glycol (GLYCOLAX) 17 gram  PwPk; Take 17 g by mouth 3 (three) times daily as needed for Constipation.  Dispense: 30 packet; Refill: 11  -     magnesium citrate solution; Take 148 mLs by mouth once. for 1 dose  Dispense: 296 mL; Refill: 0    2. Kidney stone  -     tamsulosin (FLOMAX) 0.4 mg Cap; Take 1 capsule (0.4 mg total) by mouth every other day.  Dispense: 30 capsule; Refill: 2       Keep appointment with Urology  Increase fiber in diet.  Once bowel movements are cheek.  Use MiraLax once daily.  Use makes citrate p.r.n. severe constipation.  Try to use opioids sparingly.

## 2024-10-11 ENCOUNTER — LAB VISIT (OUTPATIENT)
Dept: LAB | Facility: HOSPITAL | Age: 79
End: 2024-10-11
Payer: MEDICARE

## 2024-10-11 DIAGNOSIS — R06.02 SOB (SHORTNESS OF BREATH): Primary | ICD-10-CM

## 2024-10-11 LAB — D DIMER PPP IA.FEU-MCNC: 1.24 MG/L FEU

## 2024-10-11 PROCEDURE — 36415 COLL VENOUS BLD VENIPUNCTURE: CPT

## 2024-10-11 PROCEDURE — 85379 FIBRIN DEGRADATION QUANT: CPT

## 2025-01-21 NOTE — DISCHARGE INSTRUCTIONS
Please follow ONLY the instructions that are checked below.    Activity Restrictions:  [x]  Return to work will be determined on an individual basis.  [x]  No lifting greater than 10 pounds.  [x]  Avoid bending and twisting the area of your surgery more than 45 degrees from neutral position in any direction.  [x]  No driving or operating machinery:  [x]  until cleared by your surgeon.  [x]  while taking narcotic pain medications or muscle relaxants.  [x]  No cervical collar, soft collar, or lumbar brace required.  []  Wear your brace at all times. You may be given an extra brace or soft collar to wear when showering.  []  Wear your brace at all times except when flat in bed.  []  Wear brace for comfort only.  [x]  Increase ambulation over the next 2 weeks so that you are walking 2 miles per day at 2 weeks post-operatively.  [x]  Walk on paved surfaces only. It is okay to walk up and down stairs while holding onto a side rail.  [x]  No sexual activity for 2-3 weeks.    Discharge Medication/Follow-up:  [x]  Please refer to discharge medication reconciliation form.  []  Do not take ANY non-steroidal anti-inflammatory drugs (NSAIDS), including the following: ibuprofen, naprosyn, Aleve, Advil, Indocin, Mobic, or Celebrex for:  []  4 weeks  []  8 weeks  []  6 months  [x]  Prescriptions for appropriate medication will be given upon discharge.   [x]  Pain control:             [x]  Muscle relaxer:            [x]  Take docusate (Colace 100 mg): take one capsule a day as needed for constipation. You can get this over the counter.  [x]  Follow-up appointment:  [x]  10-14 days post-op for wound check by physician assistant/nurse  [x]  4-6 weeks with MD:  []  with x-rays  [x]  without x-rays  []  An appointment will be mailed to you.    Wound Care:  [x]  Remove dressing or bandaid in  1  days.  [x]  No bandage required. Keep your incision open to the air starting tomorrow.  [x]  You may shower on Friday. Have the force of water  hit you opposite from the incision. Pat the incision dry after your shower; do not scrub the incision.  [x]  You cannot take a bath until 8 weeks after surgery.  []  Apply bacitracin to incision twice a day for    more days.    Call your doctor or go to the Emergency Room for any signs of infection, including: increased redness, drainage, pain, or fever (temperature ?101.5 for 24 hours). Call your doctor or go to the Emergency Room if there are any localized neurological changes; problems with speech, vision, numbness, tingling, weakness, or severe headache; or for other concerns.    Special Instructions:  [x]  No use of tobacco products.  [x]  Diet: Please eat a regular diet as tolerated.  []  Other diet:              Specific physician instructions:        Resume aspirin on Sunday 05/26/24               Physicians need 3 days' notice for pain medicine to be refilled. Pain medicine will only be refilled between 8 AM and 5 PM, Monday through Friday, due to Food and Drug Administration regulation of documentation.        Form No. 54883 (Revised 10/31/2013)     Detail Level: Simple Detail Level: Zone

## 2025-01-31 DIAGNOSIS — N20.0 KIDNEY STONE: ICD-10-CM

## 2025-01-31 NOTE — TELEPHONE ENCOUNTER
No care due was identified.  [facility] Embedded Care Due Messages. Reference number: [id number].   1/31/2025 1:26:49 PM CST

## 2025-01-31 NOTE — TELEPHONE ENCOUNTER
LOV 9/27/2024      Patient requesting refill for   montelukast (SINGULAIR) 10 mg tablet 90 tablet 3 12/18/2023     tamsulosin (FLOMAX) 0.4 mg Cap 30 capsule 2 9/27/2024         Rx pending   Pharmacy confirmed  Please advise

## 2025-02-01 RX ORDER — TAMSULOSIN HYDROCHLORIDE 0.4 MG/1
1 CAPSULE ORAL EVERY OTHER DAY
Qty: 30 CAPSULE | Refills: 2 | Status: SHIPPED | OUTPATIENT
Start: 2025-02-01

## 2025-02-01 RX ORDER — MONTELUKAST SODIUM 10 MG/1
10 TABLET ORAL NIGHTLY
Qty: 90 TABLET | Refills: 3 | Status: SHIPPED | OUTPATIENT
Start: 2025-02-01 | End: 2025-02-10 | Stop reason: SDUPTHER

## 2025-02-03 ENCOUNTER — OFFICE VISIT (OUTPATIENT)
Dept: FAMILY MEDICINE | Facility: CLINIC | Age: 80
End: 2025-02-03
Payer: MEDICARE

## 2025-02-03 VITALS
DIASTOLIC BLOOD PRESSURE: 78 MMHG | SYSTOLIC BLOOD PRESSURE: 130 MMHG | OXYGEN SATURATION: 99 % | WEIGHT: 290.25 LBS | HEART RATE: 92 BPM | RESPIRATION RATE: 18 BRPM | BODY MASS INDEX: 39.31 KG/M2 | HEIGHT: 72 IN

## 2025-02-03 DIAGNOSIS — E11.22 TYPE 2 DIABETES MELLITUS WITH STAGE 2 CHRONIC KIDNEY DISEASE, WITHOUT LONG-TERM CURRENT USE OF INSULIN: ICD-10-CM

## 2025-02-03 DIAGNOSIS — I10 PRIMARY HYPERTENSION: ICD-10-CM

## 2025-02-03 DIAGNOSIS — Z98.890 S/P LUMBAR LAMINECTOMY: ICD-10-CM

## 2025-02-03 DIAGNOSIS — C61 PROSTATE CANCER: ICD-10-CM

## 2025-02-03 DIAGNOSIS — G47.33 OSA ON CPAP: ICD-10-CM

## 2025-02-03 DIAGNOSIS — L30.4 INTERTRIGO: Primary | ICD-10-CM

## 2025-02-03 DIAGNOSIS — I13.0 HYPERTENSIVE HEART AND CHRONIC KIDNEY DISEASE WITH HEART FAILURE AND STAGE 1 THROUGH STAGE 4 CHRONIC KIDNEY DISEASE, OR UNSPECIFIED CHRONIC KIDNEY DISEASE: ICD-10-CM

## 2025-02-03 DIAGNOSIS — E66.01 SEVERE OBESITY (BMI 35.0-39.9) WITH COMORBIDITY: ICD-10-CM

## 2025-02-03 DIAGNOSIS — N18.2 TYPE 2 DIABETES MELLITUS WITH STAGE 2 CHRONIC KIDNEY DISEASE, WITHOUT LONG-TERM CURRENT USE OF INSULIN: ICD-10-CM

## 2025-02-03 DIAGNOSIS — Z95.0 PACEMAKER: ICD-10-CM

## 2025-02-03 DIAGNOSIS — M48.062 SPINAL STENOSIS OF LUMBAR REGION WITH NEUROGENIC CLAUDICATION: ICD-10-CM

## 2025-02-03 PROBLEM — I95.2 HYPOTENSION DUE TO DRUGS: Status: RESOLVED | Noted: 2023-12-04 | Resolved: 2025-02-03

## 2025-02-03 PROCEDURE — 99999 PR PBB SHADOW E&M-EST. PATIENT-LVL IV: CPT | Mod: PBBFAC,,, | Performed by: FAMILY MEDICINE

## 2025-02-03 PROCEDURE — 99214 OFFICE O/P EST MOD 30 MIN: CPT | Mod: S$GLB,,, | Performed by: FAMILY MEDICINE

## 2025-02-03 PROCEDURE — 1157F ADVNC CARE PLAN IN RCRD: CPT | Mod: CPTII,S$GLB,, | Performed by: FAMILY MEDICINE

## 2025-02-03 PROCEDURE — 3078F DIAST BP <80 MM HG: CPT | Mod: CPTII,S$GLB,, | Performed by: FAMILY MEDICINE

## 2025-02-03 PROCEDURE — 1101F PT FALLS ASSESS-DOCD LE1/YR: CPT | Mod: CPTII,S$GLB,, | Performed by: FAMILY MEDICINE

## 2025-02-03 PROCEDURE — 1159F MED LIST DOCD IN RCRD: CPT | Mod: CPTII,S$GLB,, | Performed by: FAMILY MEDICINE

## 2025-02-03 PROCEDURE — 3075F SYST BP GE 130 - 139MM HG: CPT | Mod: CPTII,S$GLB,, | Performed by: FAMILY MEDICINE

## 2025-02-03 PROCEDURE — 3288F FALL RISK ASSESSMENT DOCD: CPT | Mod: CPTII,S$GLB,, | Performed by: FAMILY MEDICINE

## 2025-02-03 PROCEDURE — 1126F AMNT PAIN NOTED NONE PRSNT: CPT | Mod: CPTII,S$GLB,, | Performed by: FAMILY MEDICINE

## 2025-02-03 PROCEDURE — 1160F RVW MEDS BY RX/DR IN RCRD: CPT | Mod: CPTII,S$GLB,, | Performed by: FAMILY MEDICINE

## 2025-02-03 RX ORDER — NYSTATIN 100000 [USP'U]/G
POWDER TOPICAL 4 TIMES DAILY
Qty: 60 G | Refills: 5 | Status: SHIPPED | OUTPATIENT
Start: 2025-02-03

## 2025-02-03 RX ORDER — NYSTATIN AND TRIAMCINOLONE ACETONIDE 100000; 1 [USP'U]/G; MG/G
CREAM TOPICAL 4 TIMES DAILY
Qty: 60 G | Refills: 5 | Status: SHIPPED | OUTPATIENT
Start: 2025-02-03

## 2025-02-10 RX ORDER — MONTELUKAST SODIUM 10 MG/1
10 TABLET ORAL NIGHTLY
Qty: 90 TABLET | Refills: 3 | Status: SHIPPED | OUTPATIENT
Start: 2025-02-10

## 2025-02-10 RX ORDER — MONTELUKAST SODIUM 10 MG/1
10 TABLET ORAL NIGHTLY
Qty: 90 TABLET | Refills: 3 | OUTPATIENT
Start: 2025-02-10

## 2025-02-10 NOTE — TELEPHONE ENCOUNTER
LOV:  02/03/2025   Pt requesting refill of:   montelukast (SINGULAIR) 10 mg tablet   Medication pended     Please advise

## 2025-02-10 NOTE — TELEPHONE ENCOUNTER
No care due was identified.  [facility] Embedded Care Due Messages. Reference number: [id number].   2/10/2025 9:20:07 AM CST

## 2025-02-10 NOTE — TELEPHONE ENCOUNTER
----- Message from Eva sent at 2/10/2025  9:09 AM CST -----  Contact: patient  Alejo Melvin  MRN: [id number]  : 1945  PCP: Leroy Alston  Home Phone      [phone]  Work Phone      Not on file.  Mobile          [phone]      MESSAGE:   Pt requesting refill or new Rx.   Is this a refill or new RX:  refill  RX name and strength: montelukast (SINGULAIR) 10 mg tablet   Last office visit: 2025  Is this a 30-day or 90-day RX:  90 day rx  Pharmacy name and location:  \[facility] [place]   Comments:      Phone:  [phone]

## 2025-04-25 ENCOUNTER — TELEPHONE (OUTPATIENT)
Dept: INTERNAL MEDICINE | Facility: CLINIC | Age: 80
End: 2025-04-25
Payer: MEDICARE

## 2025-04-25 NOTE — TELEPHONE ENCOUNTER
Pt called and he's having blood in stool   Pt also had blood after having a stool  I advised pt to just keep an eye if its only when using restroom its possible hemorrhoid   If active bleeding go to er

## 2025-04-29 DIAGNOSIS — N20.0 KIDNEY STONE: ICD-10-CM

## 2025-04-29 RX ORDER — TAMSULOSIN HYDROCHLORIDE 0.4 MG/1
1 CAPSULE ORAL EVERY OTHER DAY
Qty: 30 CAPSULE | Refills: 2 | Status: SHIPPED | OUTPATIENT
Start: 2025-04-29

## 2025-04-29 NOTE — TELEPHONE ENCOUNTER
----- Message from Giuliano sent at 4/29/2025 10:51 AM CDT -----  Contact: Fax - [facility]  Alejo ChavezjohnathonfMRN: [id number]: 1945PCP: Leroy Alston.Home Phone      [phone] Phone      Not on file.Mobile          [phone]: Pt requesting refill or new Rx. Is this a refill or new RX:  refillRX name and strength: Tamsulosin HCL 0.4 mgLast office visit: 02/03/25Is this a 30-day or 90-day RX:  30 dayPharmacy name and location:  [facility] [place]:  Phone:  faxPCP: Alecia

## 2025-04-29 NOTE — TELEPHONE ENCOUNTER
LOV:  02/03/25  Pt requesting refill of:   Tamsulosin HCL 0.4 mg   Medication pended     Please advise

## 2025-04-29 NOTE — TELEPHONE ENCOUNTER
No care due was identified.  [facility] Embedded Care Due Messages. Reference number: [id number].   4/29/2025 11:15:23 AM CDT

## 2025-05-08 ENCOUNTER — PATIENT MESSAGE (OUTPATIENT)
Dept: ADMINISTRATIVE | Facility: OTHER | Age: 80
End: 2025-05-08
Payer: MEDICARE

## 2025-05-08 ENCOUNTER — PATIENT MESSAGE (OUTPATIENT)
Dept: HEMATOLOGY/ONCOLOGY | Facility: CLINIC | Age: 80
End: 2025-05-08
Payer: MEDICARE

## 2025-05-09 ENCOUNTER — PATIENT MESSAGE (OUTPATIENT)
Dept: ADMINISTRATIVE | Facility: OTHER | Age: 80
End: 2025-05-09
Payer: MEDICARE

## 2025-06-19 ENCOUNTER — TELEPHONE (OUTPATIENT)
Dept: HEMATOLOGY/ONCOLOGY | Facility: CLINIC | Age: 80
End: 2025-06-19
Payer: MEDICARE

## 2025-06-19 NOTE — TELEPHONE ENCOUNTER
Called and spoke to patient to confirm the in office appointment on 6/26/25 and to complete the questionnaire.

## 2025-06-25 ENCOUNTER — TELEPHONE (OUTPATIENT)
Dept: HEMATOLOGY/ONCOLOGY | Facility: CLINIC | Age: 80
End: 2025-06-25
Payer: MEDICARE

## 2025-06-26 ENCOUNTER — LAB VISIT (OUTPATIENT)
Dept: LAB | Facility: HOSPITAL | Age: 80
End: 2025-06-26
Payer: MEDICARE

## 2025-06-26 ENCOUNTER — OFFICE VISIT (OUTPATIENT)
Dept: HEMATOLOGY/ONCOLOGY | Facility: CLINIC | Age: 80
End: 2025-06-26
Payer: MEDICARE

## 2025-06-26 ENCOUNTER — PATIENT MESSAGE (OUTPATIENT)
Dept: HEMATOLOGY/ONCOLOGY | Facility: CLINIC | Age: 80
End: 2025-06-26

## 2025-06-26 DIAGNOSIS — C61 PROSTATE CANCER: ICD-10-CM

## 2025-06-26 DIAGNOSIS — Z71.83 ENCOUNTER FOR NONPROCREATIVE GENETIC COUNSELING: Primary | ICD-10-CM

## 2025-06-26 DIAGNOSIS — Z80.9 FAMILY HISTORY OF CARCINOID TUMOR: ICD-10-CM

## 2025-06-26 PROCEDURE — 99999 PR PBB SHADOW E&M-EST. PATIENT-LVL II: CPT | Mod: PBBFAC,,, | Performed by: PHYSICIAN ASSISTANT

## 2025-06-26 PROCEDURE — 36415 COLL VENOUS BLD VENIPUNCTURE: CPT

## 2025-06-26 NOTE — PROGRESS NOTES
Hereditary/High Risk Clinic  Department of Hematology and Oncology  [facility]    Cancer Genetics  Initial Consultation    Date of Service:  25  Visit Provider:  Siobhan Patel PA-C  Collaborating Physician:  Karla Rosales MD    Patient:  Alejo Melvin  :  1945  MRN:   [id number]     Referring Provider    [facility], Provider  [address] [place] [address]    Approximately 35 minutes were spent face-to-face with the patient.    Approximately 60 minutes in total were spent on this encounter, which includes face-to-face time and non-face-to-face time preparing to see the patient (e.g., review of tests), obtaining and/or reviewing separately obtained history, documenting clinical information in the electronic or other health record, independently interpreting results (not separately reported) and communicating results to the patient/family/caregiver, or care coordination (not separately reported).     SUBJECTIVE   Chief Complaint: Cancer genetic risk assessment    HPI:  Alejo Melvin is a 80 y.o. assigned male at birth who is new to the [facility] Department of Hematology and Oncology and to me.     He completed the Genetic Wellness Assessment and requested to see a genetics provider due to the following:   [x] Personal history of cancer   [] Family history of cancer  [] FDR/SDR tested positive for a genetic mutation (hereditary cancer syndrome)        Focused personal history:   Cancer:   Prostate cancer, high risk (dx 2019, age 74).   PSA 6.8 (2019), 10.0 (3/2020), prostate biopsy 2020, radiation   Currently on abiraterone acetate x 2 years with undetectable PSA  Other tumor or pertinent mass/lesion: No  Blood disorder:  No  Pancreatitis:  No  Ashkenazi Caodaism:  No  Germline cancer-genetic testing: No    Cancer Screening:   Colonoscopy:   3/3023 - TA x1  2014 - HP x1    Family History  Consanguinity: No  Hereditary cancer genetic testing in blood  relatives: No  Pertinent family history:   Daughter: Carcinoid lung tumor 40, living 57  Brother:  from metastatic cancer at 65, unsure what primary was, +smoker.   Father:  from metastatic cancer at 62, unsure what primary was, heavy smoker  Limited info about father's siblings. Some  before patient was born.         If this pedigree appears small/illegible on your screen, expand this note window horizontally. A copy of the pedigree is also available in Media.     Past Medical History:   Diagnosis Date    Arthritis     thumbs and knees    Back pain     Basal cell carcinoma (BCC) in situ of skin     Bilateral carpal tunnel syndrome 2019    Elevated PSA, less than 10 ng/ml 2020    Hyperlipidemia     Hypertension     Obesity     Osteoporosis     Polyneuropathy     left foot    Prostate cancer 2019    Sleep apnea     cpap    Urinary incontinence     lasix causes urge incontenence      Social History     Tobacco Use    Smoking status: Never     Passive exposure: Never    Smokeless tobacco: Never   Substance Use Topics    Alcohol use: Not Currently     Review of Systems  See HPI.    Family History   Problem Relation Name Age of Onset    Cancer Father Navin 60        metastatic, unknown primary, heavy smoker    Cancer Brother Richard 60 - 69        metastatic, unknown primary, heavy smoker    Carcinoid tumor of lung Daughter Danna 40        surgery     OBJECTIVE   Physical Exam  Very pleasant patient.  Accompanied by his wife, Yessenia  Vitals signs:  There were no vitals filed for this visit.   Constitutional: No apparent distress.   Pulmonary: Normal effort  Neurological: Alert and oriented. No obvious neurological deficits.   Psychiatric: Normal mood, affect, thought content, speech, behavior, judgment.    COUNSELING     Cancer is caused by an accumulation of genetic mutations that allow cells to grow and divide uncontrollably to form a tumor. Individuals with certain risk factors are more likely  to develop genetic mutations that lead to cancer.      Age: Advancing age is the most important risk factor for cancer overall and for many individual cancer types.    Alcohol: Drinking alcohol can increase your risk of cancer of the mouth, throat, esophagus, larynx, liver, and breast.    Smoking: Tobacco contains benzene and other chemicals and can cause cancer of the lung, larynx, mouth, esophagus, throat, bladder, kidney, liver, stomach, pancreas, colon and rectum, and cervix, as well as acute myeloid leukemia.    Chemicals: Asbestos, benzene, vinyl chloride, pesticides, fertilizer, wood dust, radon, aflatoxin (a food contaminant), arsenic (a drinking water contaminant), etc.  Radiation: Ultraviolet (sun, tanning beds) and ionizing radiation (radiation therapy, xrays, CT scans).  Chronic inflammation: Chronic infections (Shannan-Barr virus, HPV, hepatitis B and C, H. Pylori), abnormal immune reactions, and conditions like obesity and inflammatory bowel disease can cause DNA damage and cancer.   Hormones: Combined hormone therapy (estrogen and progestin) increases the risk for breast cancer. Hormone therapy with estrogen alone increases the risk for endometrial cancer. Diethylstilbestrol (ELIZABET) is a form of estrogen given to some pregnant women from 9361-6213 to prevent premature labor, miscarriages, and other problems. Women who took ELIZABET have an increased risk for breast cancers and their daughters have an increased risk for vaginal and cervical cancer.   Medical conditions: Fatty liver disease, cirrhosis, type 2 diabetes, metabolic syndrome (obesity, high blood pressure, high cholesterol, insuline resistance).   Immunosuppression: Due to condition (HIV/AIDS) or taking immunosuppressive medications (after organ transplant, etc).     While most individuals have a family history of cancer, only 5-10% of cancers are hereditary, meaning they are caused by inherited genetic mutations. The remaining cancers are  sporadic, meaning they are caused by mutations acquired during the individual's life as a result of aging, environmental exposures, and other causes. Some families have multiple cases of a particular cancer, but that is usually due to the cancer being very common (lung, breast, prostate), shared risk factors (environment, lifestyle), and possibly a combination of hereditary factors (for example, some families have abnormalities in their immune system or how their bodies process toxins).     Sporadic cancer (75-80%): Sporadic cancers are random occurrences caused by an accumulation of genetic mutations acquired during the individual's lifetime.   Hereditary cancer (5-10%): Hereditary cancers are caused by an inherited mutation in a single gene (usually a tumor suppressor gene) and acquired mutations. A family with a hereditary cancer syndrome will typically have individuals in every generation with the cancers caused by that gene, often diagnosed 10-20 years younger than usual, which for most cancers is before age 50.   Familial cancer (20%): Familial cancer refers to a clustering a cancer in a family that may be more than you would expect to see by chance, but they do not have an inherited mutation in a single gene that caused the cancers. Instead, their cancers are caused by a combination of shared genetic, environmental, and lifestyle factors.      GERMLINE GENETIC TESTING    Purpose:  Analyze specific genes for inherited mutations.     Results: Positive, negative, and uncertain. A positive result means a cancer-causing mutation was detected. A negative result means no cancer-causing mutations were detected. Uncertain means a genetic change was detected but it is not known if that change results in an increased risk for cancer.     Benefits: When an individual is aware that they have a germline mutation that increases their risk for certain cancers, they can engage in the recommended screening and risk-reduction  strategies.     Risks: Genetic discrimination occurs when any entity uses an individual's genetic information to make a decision that negatively impacts them. Examples would include an insurance refusing to issue a policy or an employer refusing to hire someone because they have a germline genetic mutation. The Genetic Information Nondiscrimination Act of 2008 (HERRERA) is a federal law that protects healthcare insurance and some employment. This law does not protect elective insurance such as life insurance, long- and short-term disability insurance, long-term care insurance, and cancer policies.     Cost:   Cost with insurance: If testing is covered by insurance, most people pay $0-100 out of pocket for testing. The max anyone typically pays is $250, which is usually if they haven't met their deductible for the year.   Cost without insurance: The cash price for testing is $250. The cash price applies to people who do not have health insurance or have insurance, but their insurance company denied the claim due to not meeting the insurance company's clinical criteria for testing.   Most labs offer financial assistance that can lower the out of pocket amount owed for testing.     ASSESSMENT/PLAN   Comprehensive cancer risk assessment was performed today, which included an evaluation of the patient's personal and family history of cancer/tumors/polyps, estimation of the likelihood of hereditary cancer syndrome, and discussion of the potential value and risks of genetic testing.     Based on the information provided, Alejo's personal history meets criteria for testing.     The recommendation is to proceed with germline testing to include the genes commonly associated with hereditary prostate cancer (DANIEL, BRCA1, BRCA2, CHEK2, EPCAM, HOXB13, MLH1, MSH2, MSH6, PALB2, PMS2, TP53) and NET. Alejo was given the option of proceeding with testing now, deferring testing to a later date, or declining testing and opted to proceed.      1. Encounter for nonprocreative genetic counseling    2. Prostate cancer  - [facility] - Hereditary Cancer with RNA; Future    3. Family history of carcinoid tumor      - Proceed with germline genetic testing as noted below.   - Follow up in 27 days (on 7/23/2025) for results review, post-test genetic counseling.      Questions were encouraged and answered to the patient's satisfaction, and he verbalized understanding of information and agreement with the plan.       REFERENCES     National Comprehensive Cancer Network (NCCN). Genetic/familial high-risk assessment: Breast, ovarian, pancreatic, and prostate. NCCN Clinical Practice Guidelines in Oncology (NCCN Guidelines), Version 3.2025.  National Comprehensive Cancer Network (NCCN). Neuroendocrine and adrenal tumors. NCCN Clinical Practice Guidelines in Oncology (NCCN Guidelines), Version 1.2025.      DENNIS Johns, PA-C  Physician Assistant, Hereditary & High Risk Clinic  Hematology Oncology, [facility]

## 2025-07-21 ENCOUNTER — OFFICE VISIT (OUTPATIENT)
Dept: INTERNAL MEDICINE | Facility: CLINIC | Age: 80
End: 2025-07-21
Payer: MEDICARE

## 2025-07-21 VITALS
WEIGHT: 279.75 LBS | SYSTOLIC BLOOD PRESSURE: 98 MMHG | DIASTOLIC BLOOD PRESSURE: 74 MMHG | RESPIRATION RATE: 16 BRPM | HEIGHT: 72 IN | OXYGEN SATURATION: 96 % | BODY MASS INDEX: 37.89 KG/M2 | HEART RATE: 81 BPM

## 2025-07-21 DIAGNOSIS — E78.2 MIXED HYPERLIPIDEMIA: Primary | ICD-10-CM

## 2025-07-21 DIAGNOSIS — N20.0 KIDNEY STONE: ICD-10-CM

## 2025-07-21 DIAGNOSIS — G47.33 OSA ON CPAP: ICD-10-CM

## 2025-07-21 DIAGNOSIS — C61 PROSTATE CANCER: ICD-10-CM

## 2025-07-21 PROCEDURE — 99999 PR PBB SHADOW E&M-EST. PATIENT-LVL IV: CPT | Mod: PBBFAC,,, | Performed by: FAMILY MEDICINE

## 2025-07-21 PROCEDURE — 1126F AMNT PAIN NOTED NONE PRSNT: CPT | Mod: CPTII,S$GLB,, | Performed by: FAMILY MEDICINE

## 2025-07-21 PROCEDURE — 1157F ADVNC CARE PLAN IN RCRD: CPT | Mod: CPTII,S$GLB,, | Performed by: FAMILY MEDICINE

## 2025-07-21 PROCEDURE — 3288F FALL RISK ASSESSMENT DOCD: CPT | Mod: CPTII,S$GLB,, | Performed by: FAMILY MEDICINE

## 2025-07-21 PROCEDURE — 3074F SYST BP LT 130 MM HG: CPT | Mod: CPTII,S$GLB,, | Performed by: FAMILY MEDICINE

## 2025-07-21 PROCEDURE — 1160F RVW MEDS BY RX/DR IN RCRD: CPT | Mod: CPTII,S$GLB,, | Performed by: FAMILY MEDICINE

## 2025-07-21 PROCEDURE — 1159F MED LIST DOCD IN RCRD: CPT | Mod: CPTII,S$GLB,, | Performed by: FAMILY MEDICINE

## 2025-07-21 PROCEDURE — 1101F PT FALLS ASSESS-DOCD LE1/YR: CPT | Mod: CPTII,S$GLB,, | Performed by: FAMILY MEDICINE

## 2025-07-21 PROCEDURE — 99214 OFFICE O/P EST MOD 30 MIN: CPT | Mod: S$GLB,,, | Performed by: FAMILY MEDICINE

## 2025-07-21 PROCEDURE — 3078F DIAST BP <80 MM HG: CPT | Mod: CPTII,S$GLB,, | Performed by: FAMILY MEDICINE

## 2025-07-21 RX ORDER — TIRZEPATIDE 2.5 MG/.5ML
2.5 INJECTION, SOLUTION SUBCUTANEOUS
Qty: 4 PEN | Refills: 0 | Status: SHIPPED | OUTPATIENT
Start: 2025-07-21 | End: 2025-07-21

## 2025-07-21 RX ORDER — TAMSULOSIN HYDROCHLORIDE 0.4 MG/1
1 CAPSULE ORAL EVERY OTHER DAY
Qty: 30 CAPSULE | Refills: 5 | Status: SHIPPED | OUTPATIENT
Start: 2025-07-21

## 2025-07-21 NOTE — PROGRESS NOTES
Subjective:       Patient ID: Alejo Melvin is a 80 y.o. male.    Chief Complaint: Follow-up (6 month follow up)    HPI  History of Present Illness    CHIEF COMPLAINT:  Patient presents today for follow up.    PROSTATE CANCER:  He has received radiation treatment for prostate cancer with multiple oncologists. His PSA levels are currently virtually zero (0.0 range) and treatment appears to be progressing well. He has a follow-up visit scheduled in approximately one month. He was previously on Elagard every six months but is no longer receiving this treatment.    SKIN CONCERNS:  He has fragile skin associated with aging, with increased susceptibility to bruising and bleeding with minor trauma. He experiences spontaneous bleeding and skin tearing with minimal contact despite wearing a protective sleeve to minimize skin injury.    CURRENT MEDICATIONS:  He takes Prednisone twice daily for an inflammatory condition, Zytiga and Tamsulosin (Flomax) every other day, Valsartan (Diovan) 80mg daily with a second dose if blood pressure elevates, and Effexor for depression.    BLOOD PRESSURE:  His current blood pressure is 98/74, which is noted to be low.    SLEEP APNEA:  He reports consistent use of CPAP mask for sleep apnea management.    PHYSICAL ACTIVITY:  He reports increased stamina through walking, currently achieving 2,500 steps per day.      ROS:  General: -fever, -chills, -fatigue, -weight gain, -weight loss, +increased energy levels, +increased activities, +hot flashes  Eyes: -vision changes, -redness, -discharge  ENT: -ear pain, -nasal congestion, -sore throat  Cardiovascular: -chest pain, -palpitations, -lower extremity edema  Respiratory: -cough, -shortness of breath, +apnea  Gastrointestinal: -abdominal pain, -nausea, -vomiting, -diarrhea, -constipation, -blood in stool  Genitourinary: -dysuria, -hematuria, -frequency  Musculoskeletal: -joint pain, -muscle pain  Skin: -rash, -lesion, +easy or excessive  bleeding, +easy bruising  Neurological: -headache, -dizziness, -numbness, -tingling  Psychiatric: -anxiety, +depression, -sleep difficulty       Review of Systems    Objective:      Vitals:    07/21/25 1048   BP: 98/74   Pulse: 81   Resp: 16     Physical Exam    Physical Exam    Vitals: Weight: 279 lbs. Blood pressure: 98/74.  General: No acute distress. Well-developed. Well-nourished.  Eyes: EOMI. Sclerae anicteric.  HENT: Normocephalic. Atraumatic. Nares patent. Moist oral mucosa.  Ears: Bilateral TMs clear. Bilateral EACs clear. Cerumen present.  Cardiovascular: Regular rate. Regular rhythm. No murmurs. No rubs. No gallops. Normal S1, S2.  Respiratory: Normal respiratory effort. Clear to auscultation bilaterally. No rales. No rhonchi. No wheezing.  Abdomen: Soft. Non-tender. Non-distended. Normoactive bowel sounds.  Musculoskeletal: No  obvious deformity.  Extremities: No lower extremity edema.  Neurological: Alert & oriented x3. No slurred speech. Normal gait.  Psychiatric: Normal mood. Normal affect. Good insight. Good judgment.  Skin: Warm. Dry. No rash. Contusions or bruising present. Bruising present.       BMP  Lab Results   Component Value Date     (L) 05/22/2024    K 4.6 05/22/2024     05/22/2024    CO2 25 05/22/2024    BUN 24 (H) 05/22/2024    CREATININE 0.8 05/22/2024    CALCIUM 8.2 (L) 05/22/2024    ANIONGAP 5 (L) 05/22/2024    EGFRNORACEVR >60 05/22/2024       Assessment:       1. Mixed hyperlipidemia    2. Kidney stone    3. Prostate cancer    4. BMI 40.0-44.9, adult    5. MARYLU on CPAP        Plan:   1. Mixed hyperlipidemia  Overview:  Low fat diet.  Avoid sweets.  A 10 pound weight loss by the next visit as a  good goal.  Increase consumption of fruits and vegetables, fish and chicken.  Use medications below:        fish oil-omega-3 fatty acids 300-1,000 mg capsule; Take 2 capsules (2 g total) by mouth once daily.  - Rosuvastatin 40 mg by mouth daily  -     Zetia 10 mg daily        2.  Kidney stone  -     tamsulosin (FLOMAX) 0.4 mg Cap; Take 1 capsule (0.4 mg total) by mouth every other day.  Dispense: 30 capsule; Refill: 5    3. Prostate cancer  Overview:  Finished harmone therapy  Scheduled for RXT soon      4. BMI 40.0-44.9, adult    5. MARYLU on CPAP  Overview:  Doing very well on CPAP    Orders:  -     Discontinue: tirzepatide (MOUNJARO) 2.5 mg/0.5 mL PnIj; Inject 2.5 mg into the skin every 7 days.  Dispense: 4 Pen; Refill: 0  -     tirzepatide, weight loss, 2.5 mg/0.5 mL PnIj; Inject 2.5 mg into the skin every 7 days.  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  - Current treatment includes Lupron shot (Eligard/leuprolide), Zytiga (abiraterone), and prednisone in conjunction with Everolimus.  - PSA level is virtually zero, indicating effective management.  - Patient is also on tamsulosin and valsartan.  - Will consider potential for weaning off prednisone pending discussion with oncologist.  - Patient has an upcoming appointment for prostate cancer follow up in the next 1.5 months.    ## CUTANEOUS FRAGILITY (SENILE SKIN):  - Patient reports having senile skin with bleeding when bumping into things, indicating cutaneous fragility.  - Recommend continued use of protective sleeve to prevent skin injury.    ## DEPRESSION:  - Continued Effexor for depression management.    ## OBSTRUCTIVE SLEEP APNEA:  - Patient uses CPAP therapy for sleep apnea management.  - Initiated Mounjaro (tirzepatide) 2.5 mg with plan to increase dose monthly to 5 mg, then 7.5 mg, which may help improve sleep apnea through weight reduction.    ## OBESITY:  - Patient  reports increased physical activity, walking up to 2500 steps a day with improved stamina.  - Initiated Mounjaro (tirzepatide) 2.5 mg for weight management with plan to increase dose monthly to 5 mg, then 7.5 mg.  - Ordered prior authorization for Mounjaro through insurance.  - Patient to continue current walking regimen.    ## STEROID USE:  - Explained that prednisone is a corticosteroid used to decrease inflammation, not an antidepressant as patient believed.  - Continued prednisone twice daily for inflammation management.    ## FOLLOW-UP:  - Scheduled follow up in 3 months to reassess weight loss progress and potentially adjust medication dosages.  - Will obtain copy of upcoming lab work results from Dr. Kumar.

## 2025-07-23 ENCOUNTER — OFFICE VISIT (OUTPATIENT)
Dept: HEMATOLOGY/ONCOLOGY | Facility: CLINIC | Age: 80
End: 2025-07-23
Payer: MEDICARE

## 2025-07-23 ENCOUNTER — TELEPHONE (OUTPATIENT)
Dept: HEMATOLOGY/ONCOLOGY | Facility: CLINIC | Age: 80
End: 2025-07-23
Payer: MEDICARE

## 2025-07-23 DIAGNOSIS — C61 PROSTATE CANCER: ICD-10-CM

## 2025-07-23 DIAGNOSIS — Z71.83 ENCOUNTER FOR NONPROCREATIVE GENETIC COUNSELING: Primary | ICD-10-CM

## 2025-07-23 PROCEDURE — 99499 UNLISTED E&M SERVICE: CPT | Mod: 95,,, | Performed by: PHYSICIAN ASSISTANT

## 2025-07-23 NOTE — PROGRESS NOTES
Hereditary/High Risk Clinic  Department of Hematology and Oncology  [facility]    Cancer Genetics  Results Disclosure & Post-Test Counseling    Date of Service:  25  Visit Provider:  Siobhan Patel PA-C  Collaborating Physician:  Karla Rosales MD    Patient:  Alejo Melvin  :  1945  MRN:  [id number]     Referring Provider:   [facility], Provider  [address] [place] [address]       Audio Only Telehealth Visit   The patient location is: [place]  The chief complaint leading to consultation is: Post-test counseling  Visit type: Virtual visit with audio only (telephone)  Total time spent in medical discussion with patient: 6 minutes  Total time spent on date of the encounter:20 minutes    The reason for the audio only service rather than synchronous audio and video virtual visit was related to technical difficulties. Patient was unable to sign in for the virtual visit.     Each patient to whom I provide medical services by telemedicine is:  (1) informed of the relationship between the physician and patient and the respective role of any other health care provider with respect to management of the patient; and (2) notified that they may decline to receive medical services by telemedicine and may withdraw from such care at any time. Patient verbally consented to receive this service via voice-only telephone call.    This service was not originating from a related E/M service provided within the previous 7 days nor will  to an E/M service or procedure within the next 24 hours or my soonest available appointment.  Prevailing standard of care was able to be met in this audio-only visit.      SUBJECTIVE   Chief Complaint: Post-test genetic counseling    History of Present Illness (HPI): Alejo was previously seen by me for pre-test genetic counseling and returns to review the results of testing and recommendations.     Medical, surgical, family history: Reviewed. No  pertinent changes from previous visit.     Review of Systems  See HPI.      OBJECTIVE   Physical Exam  Limited secondary to the inherent nature of a virtual visit.  Very pleasant patient.  Constitutional: No apparent distress.   Neurological: Alert and oriented. No obvious neurological deficits.   Psychiatric: Normal mood, affect, thought content, speech, behavior, judgment.    GENETIC TESTING RESULTS        A copy of the results report is available in [facility].     TEST [facility] xG+ CancerNext-Expanded +RNAinsight   GENES ANALYZED (77 total): AIP, ALK, APC, DANIEL, BAP1, BARD1, BMPR1A, BRCA1, BRCA2, BRIP1, CDC73, CDH1, CDK4, CDKN1B, CDKN2A, CEBPA, CHEK2, DICER1, ETV6, FH, FLCN, GATA2, LZTR1, MAX, MEN1, MET, MLH1, MSH2, MSH6, MUTYH, NF1, NF2, NTHL1, PALB2, PHOX2B, PMS2, POT1, VQQIJ0J, PTCH1, PTEN, RAD51C, RAD51D, RB1, RET, RPS20, RUNX1, SDHA, SDHAF2, SDHB, SDHC, SDHD, SMAD4, SMARCA4, SMARCB1, SMARCE1, STK11, SUFU, XJQM097, TP53, TSC1, TSC2, VHL and WT1 (sequencing and deletion/duplication); AXIN2, CTNNA1, DDX41, EGFR, HOXB13, KIT, MBD4, MITF, MSH3, PDGFRA, POLD1 and POLE (sequencing only); EPCAM and GREM1 (deletion/duplication only). RNA data is routinely analyzed for use in variant interpretation for all genes.    RESULT DATE 2025   RESULT Negative     Note: A negative result does not completely rule out the possibility of a hereditary cancer syndrome. The individual could have a mutation in a gene that was not included on this panel or is not detectable using current testing technology.     ASSESSMENT   Alejo has a personal/family history of:   Alejo: Prostate cancer, high risk (dx 2019, age 74)  Daughter: Carcinoid lung tumor 40, living 57  Brother:  from metastatic cancer at 65, unsure what primary was, +smoker.   Father:  from metastatic cancer at 62, unsure what primary was, heavy smoker  Limited info about father's siblings. Some  before patient was born.     Alejo underwent comprehensive  germline genetic testing that included the genes associated with hereditary prostate, carcinoid, and other cancers. The test was negative. The test did not identify any variants that are known to cause cancer.    Interpretation:   In regards to Alejo's personal history of cancer, this suggests that he had a sporadic cancer.   In regards to his family history of cancer, this is considered an uninformative negative, as it is unknown if his relatives with cancer have a mutation he did not inherit.     PLAN     1. Encounter for nonprocreative genetic counseling    2. Prostate cancer     - No further genetic testing is indicated.      Questions were encouraged and answered to the patient's satisfaction, and he verbalized understanding of information and agreement with the plan.       REFERENCES   National Comprehensive Cancer Network (NCCN). Genetic/familial high-risk assessment: Breast, ovarian, pancreatic, and prostate. NCCN Clinical Practice Guidelines in Oncology (NCCN Guidelines), Version 3.2025.      DENNIS Johns PA-C  Physician Assistant, Hereditary/High Risk Clinic  Hematology/Oncology, [facility]

## 2025-07-24 ENCOUNTER — TELEPHONE (OUTPATIENT)
Dept: FAMILY MEDICINE | Facility: CLINIC | Age: 80
End: 2025-07-24
Payer: MEDICARE

## 2025-07-24 NOTE — TELEPHONE ENCOUNTER
----- Message from Giuliano sent at 2025  3:56 PM CDT -----  Contact: Patient  Alejo Melvin  MRN: [id number]  : 1945  PCP: Leroy Alston  Home Phone      [phone]  Work Phone      Not on file.  Mobile          [phone]      MESSAGE: Rx for Tirzepatide sent to [facility] yesterday -- they do not have the medication -- [facility]  in [place] has the medication, but it requires PA -- please send Rx & initiate PA    Call [phone]    PCP: Alecia

## 2025-07-29 DIAGNOSIS — I70.0 AORTIC ATHEROSCLEROSIS: ICD-10-CM

## 2025-07-29 DIAGNOSIS — G47.33 OSA ON CPAP: Primary | ICD-10-CM

## 2025-07-29 RX ORDER — TIRZEPATIDE 2.5 MG/.5ML
2.5 INJECTION, SOLUTION SUBCUTANEOUS
Qty: 2 ML | Refills: 0 | Status: SHIPPED | OUTPATIENT
Start: 2025-07-29

## 2025-07-29 NOTE — PROGRESS NOTES
Diagnoses and all orders for this visit:    MARYLU on CPAP  -     tirzepatide, weight loss, (ZEPBOUND) 2.5 mg/0.5 mL PnIj; Inject 2.5 mg into the skin every 7 days.    BMI 40.0-44.9, adult  -     tirzepatide, weight loss, (ZEPBOUND) 2.5 mg/0.5 mL PnIj; Inject 2.5 mg into the skin every 7 days.    Aortic atherosclerosis  -     tirzepatide, weight loss, (ZEPBOUND) 2.5 mg/0.5 mL PnIj; Inject 2.5 mg into the skin every 7 days.

## 2025-08-25 ENCOUNTER — OFFICE VISIT (OUTPATIENT)
Dept: INTERNAL MEDICINE | Facility: CLINIC | Age: 80
End: 2025-08-25
Payer: MEDICARE

## 2025-08-25 VITALS
DIASTOLIC BLOOD PRESSURE: 80 MMHG | HEIGHT: 72 IN | OXYGEN SATURATION: 97 % | BODY MASS INDEX: 38.37 KG/M2 | WEIGHT: 283.31 LBS | HEART RATE: 74 BPM | RESPIRATION RATE: 18 BRPM | SYSTOLIC BLOOD PRESSURE: 130 MMHG

## 2025-08-25 DIAGNOSIS — S41.112D SKIN TEAR OF LEFT UPPER ARM WITHOUT COMPLICATION, SUBSEQUENT ENCOUNTER: Primary | ICD-10-CM

## 2025-08-25 PROCEDURE — 99999 PR PBB SHADOW E&M-EST. PATIENT-LVL III: CPT | Mod: PBBFAC,,, | Performed by: FAMILY MEDICINE

## 2025-08-25 PROCEDURE — 1160F RVW MEDS BY RX/DR IN RCRD: CPT | Mod: CPTII,S$GLB,, | Performed by: FAMILY MEDICINE

## 2025-08-25 PROCEDURE — 1159F MED LIST DOCD IN RCRD: CPT | Mod: CPTII,S$GLB,, | Performed by: FAMILY MEDICINE

## 2025-08-25 PROCEDURE — 1157F ADVNC CARE PLAN IN RCRD: CPT | Mod: CPTII,S$GLB,, | Performed by: FAMILY MEDICINE

## 2025-08-25 PROCEDURE — 1126F AMNT PAIN NOTED NONE PRSNT: CPT | Mod: CPTII,S$GLB,, | Performed by: FAMILY MEDICINE

## 2025-08-25 PROCEDURE — 99213 OFFICE O/P EST LOW 20 MIN: CPT | Mod: S$GLB,,, | Performed by: FAMILY MEDICINE

## 2025-08-25 PROCEDURE — 3079F DIAST BP 80-89 MM HG: CPT | Mod: CPTII,S$GLB,, | Performed by: FAMILY MEDICINE

## 2025-08-25 PROCEDURE — 1100F PTFALLS ASSESS-DOCD GE2>/YR: CPT | Mod: CPTII,S$GLB,, | Performed by: FAMILY MEDICINE

## 2025-08-25 PROCEDURE — 3075F SYST BP GE 130 - 139MM HG: CPT | Mod: CPTII,S$GLB,, | Performed by: FAMILY MEDICINE

## 2025-08-25 PROCEDURE — 3288F FALL RISK ASSESSMENT DOCD: CPT | Mod: CPTII,S$GLB,, | Performed by: FAMILY MEDICINE

## 2025-08-25 RX ORDER — ASPIRIN 81 MG/1
TABLET ORAL
COMMUNITY
Start: 2023-12-02

## (undated) DEVICE — SOL IRR NACL .9% 3000ML

## (undated) DEVICE — BANDAGE ELAS SOFTWRAP ST 6X5YD

## (undated) DEVICE — GLOVE BIOGEL PI MICRO SZ 7.5

## (undated) DEVICE — DRESSING TEGADERM 2 3/8 X 2.75

## (undated) DEVICE — MASK FLYTE HOOD PEEL AWAY

## (undated) DEVICE — GELATIN SURGIPOWDER ABSORBABLE

## (undated) DEVICE — SEE MEDLINE ITEM 146298

## (undated) DEVICE — SUT VICRYL PLUS 0 CT1 18IN

## (undated) DEVICE — Device

## (undated) DEVICE — DRESSING AQUACEL AG ADV 3.5X12

## (undated) DEVICE — ALCOHOL 70% ISOP W/GREEN 16OZ

## (undated) DEVICE — DRAPE STERI INSTRUMENT 1018

## (undated) DEVICE — WRAP PROTECTIVE LEG POS STRL

## (undated) DEVICE — DRAPE STERI-DRAPE 1000 17X11IN

## (undated) DEVICE — DRAPE C-ARM/MOBILE XRAY 44X80

## (undated) DEVICE — DRAPE LEICA MICROSCOPE 48X120

## (undated) DEVICE — UNDERGLOVES BIOGEL PI SIZE 8.5

## (undated) DEVICE — COVER TABLE HVY DTY 60X90IN

## (undated) DEVICE — ELECTRODE REM PLYHSV RETURN 9

## (undated) DEVICE — SUT 0 VICRYL / UR6 (J603)

## (undated) DEVICE — TUBING SUC UNIV W/CONN 12FT

## (undated) DEVICE — APPLICATOR CHLORAPREP ORN 26ML

## (undated) DEVICE — DRAIN CHANNEL ROUND 10FR

## (undated) DEVICE — ADHESIVE DERMABOND ADVANCED

## (undated) DEVICE — BLADE SAGITTAL 18 X 1.27 X 90M

## (undated) DEVICE — GOWN POLY REINF BRTH SLV XL

## (undated) DEVICE — SPONGE COTTON TRAY 4X4IN

## (undated) DEVICE — BLADE ELECTRO EDGE INSULATED

## (undated) DEVICE — COVER CAMERA OPERATING ROOM

## (undated) DEVICE — KIT TOTAL KNEE TKOFG

## (undated) DEVICE — BOWL CEMENT

## (undated) DEVICE — COVER PROXIMA MAYO STAND

## (undated) DEVICE — SUT VICRYL+ 1 CT1 18IN

## (undated) DEVICE — SEE MEDLINE ITEM 157131

## (undated) DEVICE — SEE MEDLINE ITEM 156905

## (undated) DEVICE — GLOVE BIOGEL SKINSENSE PI 8.5

## (undated) DEVICE — PAD COLD THERAPY KNEE WRAP ON

## (undated) DEVICE — KIT IRR SUCTION HND PIECE

## (undated) DEVICE — SUT ETHILON 3-0 PS2 18 BLK

## (undated) DEVICE — CORD BIPOLAR 12 FOOT

## (undated) DEVICE — PUMP COLD THERAPY

## (undated) DEVICE — DRESSING AQUACEL FOAM 5 X 5

## (undated) DEVICE — COVER OVERHEAD SURG LT BLUE

## (undated) DEVICE — TOURNIQUET SB QC DP 34X4IN

## (undated) DEVICE — DRESSING MEPILEX SACR 16X20CM

## (undated) DEVICE — SOL BETADINE 5%

## (undated) DEVICE — SUT VICRYL 2 0 CT 2

## (undated) DEVICE — ADHESIVE MASTISOL VIAL 48/BX

## (undated) DEVICE — NDL 18GA X1 1/2 REG BEVEL

## (undated) DEVICE — PADDING CAST 6IN DELTA ROLL

## (undated) DEVICE — SET CEMENT (SCULP)

## (undated) DEVICE — SUT MONOCRYL 3-0 PS-1

## (undated) DEVICE — DRAPE LAP T SHT W/ INSTR PAD

## (undated) DEVICE — BANDAGE ELAS SOFTWRAP ST 3X5YD

## (undated) DEVICE — SUT VICRYL PLUS 2-0 CT1 18

## (undated) DEVICE — DRESSING AQUACEL FOAM 4X4IN

## (undated) DEVICE — DRESSING TRANS 4X4 3/4

## (undated) DEVICE — SEE MEDLINE ITEM 152548

## (undated) DEVICE — TOWEL OR NONABSORB ADH 17X26

## (undated) DEVICE — DRAPE TOP 53X102IN

## (undated) DEVICE — DRESSING SURGICAL 1/2X1/2

## (undated) DEVICE — BURR MIS CURVED 3.0MM

## (undated) DEVICE — SUT MONOCRYL 4-0 PS-2

## (undated) DEVICE — DRAPE INCISE IOBAN 2 23X33IN

## (undated) DEVICE — GOWN POLY REINF BRTH SLV LG

## (undated) DEVICE — SKINMARKER W/RULER DEVON

## (undated) DEVICE — GLOVE BIOGEL ECLIPSE SZ 7

## (undated) DEVICE — SPONGE GAUZE 16PLY 4X4

## (undated) DEVICE — EVACUATOR WOUND BULB 100CC

## (undated) DEVICE — NDL SPINAL SPINOCAN 22GX3.5